# Patient Record
Sex: FEMALE | Race: WHITE | Employment: OTHER | URBAN - METROPOLITAN AREA
[De-identification: names, ages, dates, MRNs, and addresses within clinical notes are randomized per-mention and may not be internally consistent; named-entity substitution may affect disease eponyms.]

---

## 2017-03-15 ENCOUNTER — HOSPITAL ENCOUNTER (OUTPATIENT)
Dept: OTHER | Age: 67
Discharge: HOME | End: 2017-03-15
Attending: INTERNAL MEDICINE | Admitting: INTERNAL MEDICINE

## 2017-03-15 VITALS
OXYGEN SATURATION: 96 % | DIASTOLIC BLOOD PRESSURE: 48 MMHG | TEMPERATURE: 97.7 F | RESPIRATION RATE: 16 BRPM | HEIGHT: 64 IN | SYSTOLIC BLOOD PRESSURE: 104 MMHG | HEART RATE: 66 BPM | WEIGHT: 145 LBS | BODY MASS INDEX: 24.75 KG/M2

## 2017-03-15 RX ORDER — SODIUM CHLORIDE, SODIUM LACTATE, POTASSIUM CHLORIDE, CALCIUM CHLORIDE 600; 310; 30; 20 MG/100ML; MG/100ML; MG/100ML; MG/100ML
INJECTION, SOLUTION INTRAVENOUS CONTINUOUS
Status: DISCONTINUED | OUTPATIENT
Start: 2017-03-15 | End: 2017-03-16 | Stop reason: HOSPADM

## 2017-03-15 RX ADMIN — SODIUM CHLORIDE, SODIUM LACTATE, POTASSIUM CHLORIDE, CALCIUM CHLORIDE: 600; 310; 30; 20 INJECTION, SOLUTION INTRAVENOUS at 07:41

## 2017-03-15 ASSESSMENT — PAIN - FUNCTIONAL ASSESSMENT: PAIN_FUNCTIONAL_ASSESSMENT: 0-10

## 2017-05-03 ENCOUNTER — HOSPITAL ENCOUNTER (OUTPATIENT)
Dept: MAMMOGRAPHY | Age: 67
Discharge: HOME | End: 2017-05-03
Attending: FAMILY MEDICINE | Admitting: FAMILY MEDICINE

## 2017-05-03 DIAGNOSIS — Z12.31 ENCOUNTER FOR SCREENING MAMMOGRAM FOR BREAST CANCER: ICD-10-CM

## 2017-06-30 ENCOUNTER — HOSPITAL ENCOUNTER (OUTPATIENT)
Dept: ULTRASOUND IMAGING | Age: 67
Discharge: HOME | End: 2017-06-30
Attending: FAMILY MEDICINE | Admitting: FAMILY MEDICINE

## 2017-06-30 DIAGNOSIS — N63.0 LUMP OR MASS IN BREAST: ICD-10-CM

## 2017-06-30 DIAGNOSIS — N63.0 BREAST LUMP: ICD-10-CM

## 2018-05-15 ENCOUNTER — HOSPITAL ENCOUNTER (OUTPATIENT)
Dept: MAMMOGRAPHY | Age: 68
Discharge: HOME | End: 2018-05-15
Attending: FAMILY MEDICINE | Admitting: FAMILY MEDICINE

## 2018-05-15 DIAGNOSIS — Z12.31 ENCOUNTER FOR SCREENING MAMMOGRAM FOR BREAST CANCER: ICD-10-CM

## 2019-06-25 ENCOUNTER — HOSPITAL ENCOUNTER (OUTPATIENT)
Dept: MAMMOGRAPHY | Age: 69
Discharge: HOME | End: 2019-06-25
Payer: MEDICARE

## 2019-06-25 DIAGNOSIS — Z12.31 ENCOUNTER FOR SCREENING MAMMOGRAM FOR BREAST CANCER: ICD-10-CM

## 2019-06-25 PROCEDURE — 77067 SCR MAMMO BI INCL CAD: CPT

## 2019-07-15 ENCOUNTER — HOSPITAL ENCOUNTER (OUTPATIENT)
Dept: ULTRASOUND IMAGING | Age: 69
Discharge: HOME | End: 2019-07-15
Payer: MEDICARE

## 2019-07-15 ENCOUNTER — HOSPITAL ENCOUNTER (OUTPATIENT)
Dept: MAMMOGRAPHY | Age: 69
Discharge: HOME | End: 2019-07-15
Payer: MEDICARE

## 2019-07-15 DIAGNOSIS — R92.8 ABNORMAL MAMMOGRAM: ICD-10-CM

## 2019-07-15 PROCEDURE — 76642 ULTRASOUND BREAST LIMITED: CPT

## 2019-07-15 PROCEDURE — G0279 TOMOSYNTHESIS, MAMMO: HCPCS

## 2020-01-13 ENCOUNTER — HOSPITAL ENCOUNTER (OUTPATIENT)
Dept: ULTRASOUND IMAGING | Age: 70
End: 2020-01-13
Payer: MEDICARE

## 2020-01-13 ENCOUNTER — HOSPITAL ENCOUNTER (OUTPATIENT)
Dept: MAMMOGRAPHY | Age: 70
Discharge: HOME | End: 2020-01-13
Payer: MEDICARE

## 2020-01-13 PROCEDURE — G0279 TOMOSYNTHESIS, MAMMO: HCPCS

## 2020-02-20 ENCOUNTER — OFFICE VISIT (OUTPATIENT)
Dept: ORTHOPEDIC SURGERY | Age: 70
End: 2020-02-20
Payer: MEDICARE

## 2020-02-20 VITALS — WEIGHT: 136 LBS | HEIGHT: 64 IN | BODY MASS INDEX: 23.22 KG/M2

## 2020-02-20 PROCEDURE — 99203 OFFICE O/P NEW LOW 30 MIN: CPT | Performed by: ORTHOPAEDIC SURGERY

## 2020-02-20 RX ORDER — BUPIVACAINE HYDROCHLORIDE 5 MG/ML
30 INJECTION, SOLUTION PERINEURAL ONCE
Status: COMPLETED | OUTPATIENT
Start: 2020-02-20 | End: 2020-02-20

## 2020-02-20 RX ORDER — BETAMETHASONE SODIUM PHOSPHATE AND BETAMETHASONE ACETATE 3; 3 MG/ML; MG/ML
12 INJECTION, SUSPENSION INTRA-ARTICULAR; INTRALESIONAL; INTRAMUSCULAR; SOFT TISSUE ONCE
Status: COMPLETED | OUTPATIENT
Start: 2020-02-20 | End: 2020-02-20

## 2020-02-20 RX ADMIN — BETAMETHASONE SODIUM PHOSPHATE AND BETAMETHASONE ACETATE 12 MG: 3; 3 INJECTION, SUSPENSION INTRA-ARTICULAR; INTRALESIONAL; INTRAMUSCULAR; SOFT TISSUE at 14:38

## 2020-02-20 RX ADMIN — BUPIVACAINE HYDROCHLORIDE 150 MG: 5 INJECTION, SOLUTION PERINEURAL at 14:37

## 2020-02-20 NOTE — PROGRESS NOTES
Chief Complaint    Hip Pain (lt lateral hip pain ongoing for awhile, no injury)      History of Present Illness:  Emmett Shrestha is a 78 y.o. female with 1 year of left hip pain. She reports that she came out of assisted about a year ago and since that time she has had a job that requires a lot of heavy lifting and standing for long periods of time. She reports that her pain in her hip has worsened since that time. She reports that the pain is 9 out of 10 and she takes Advil for this. She also reports that recently her bilateral ankles have been swelling and she has some numbness and tingling on the lateral aspect of her foot and ankle. Medical History:  Past Medical History:   Diagnosis Date    Hyperlipidemia     Reflux      Past Surgical History:   Procedure Laterality Date    ANKLE SURGERY      CHOLECYSTECTOMY      COLONOSCOPY  2005    normal    COLONOSCOPY  03/15/2017    diverticulosis/sigmoid polyp    HYSTERECTOMY      OTHER SURGICAL HISTORY  05/22/2013    cystoscopy urethral dilatation    TUBAL LIGATION       No family history on file. Current Outpatient Medications   Medication Sig Dispense Refill    omeprazole (PRILOSEC) 20 MG capsule TAKE ONE CAPSULE BY MOUTH ONCE DAILY. 30 capsule 0    simvastatin (ZOCOR) 40 MG tablet TAKE ONE TABLET BY MOUTH EVERY NIGHT AT BEDTIME. 30 tablet 0    albuterol sulfate HFA (PROVENTIL HFA) 108 (90 Base) MCG/ACT inhaler Inhale 2 puffs into the lungs every 4 hours as needed for Wheezing or Shortness of Breath With spacer (and mask if indicated). Thanks. 1 Inhaler 1     No current facility-administered medications for this visit. Allergies   Allergen Reactions    Dye [Barium-Containing Compounds]     Pcn [Penicillins]        Review of Systems:  Relevant review of systems reviewed and available in the patient's chart    Vital Signs: There were no vitals filed for this visit.     General Exam:   Constitutional: Patient is adequately groomed with no evidence of malnutrition  DTRs: Deep tendon reflexes are intact  Mental Status: The patient is oriented to time, place and person. The patient's mood and affect are appropriate. Lymphatic: The lymphatic examination bilaterally reveals all areas to be without enlargement or induration. Vascular: Examination reveals no swelling or calf tenderness. Peripheral pulses are palpable and 2+. Neurological: The patient has good coordination. There is no weakness or sensory deficit. Body mass index is 23.34 kg/m². Left left Hip Examination:    Inspection:  No erythema or signs of infection. There are no cutaneous lesions. Palpation:  Significant tenderness to palpation over the greater trochanteric region. Range of Motion: Painless full range of motion of the hip particularly with flexion and external rotation without any reproducible groin pain. Strength:  Core/5 strength in flexion and abduction limited by pain. Special Tests: There is a negative log roll maneuver. Negative straight leg raise against resistance. Negative Elizabeth's test.  Negative Homans test.    Skin: There are no rashes, ulcerations or lesions. Gait: Normal    Reflex 2+ patellar    Additional Comments:       Additional Examinations:         Contralateral Exam: Examination of the right hip reveals intact skin. The patient demonstrates full painless range of motion with regards to flexion, abduction, internal and external rotation. There is no tenderness about the greater trochanter. There is a negative straight leg raise against resistance. Strength is 5/5 throughout all planes. Lower Back: Examination of the lower back does not show any tenderness, deformity or injury. Range of motion is unremarkable. There is no gross instability. There are no rashes, ulcerations or lesions.   Strength and tone are normal.    Radiology:     X-rays obtained and reviewed in office:  Views 2 views including AP pelvis and lateral  Location AP

## 2020-02-25 ENCOUNTER — HOSPITAL ENCOUNTER (OUTPATIENT)
Dept: PHYSICAL THERAPY | Age: 70
Setting detail: THERAPIES SERIES
Discharge: HOME | End: 2020-02-25
Payer: MEDICARE

## 2020-02-25 PROCEDURE — 97110 THERAPEUTIC EXERCISES: CPT

## 2020-02-25 PROCEDURE — 97035 APP MDLTY 1+ULTRASOUND EA 15: CPT

## 2020-02-25 PROCEDURE — 97161 PT EVAL LOW COMPLEX 20 MIN: CPT

## 2020-02-25 NOTE — FLOWSHEET NOTE
Outpatient Physical Therapy     [x] Daily Treatment Note   [] Progress Note   [] Discharge Note    Date:  2/25/2020    Patient Name:  Cheri Sanchez         YOB: 1950    Medical Diagnosis: Pain of left hip joint, greater trochanteric bursitis of left hip     ICD 10:  M25.552     Treatment Diagnosis:   BLE weakness, L hip trochanteric bursitis, IT band syndrome, nerve impingement 2/2 lumbar scoliosis     Onset Date: 2 years (worse in past month)     Referral Date: 2/20/20     Referring Physician:  Haroldo Cole MD     Visits Allowed/Insurance/Certification Information:   [place] [employer], $40 copay       Restrictions/Precautions:  NO ionto, NO estim, NO dry needling    Plan of care sent to provider:      [x]Faxed  []Co-signature    (attempts: 1[] 2 []3[])         Plan of care signed:      []Yes date:            []No      Progress Note covers period from (if applicable):    [x]NA    []From          To           Next Progress Note due:   3/22/20    Visit# / total visits:  1/8    Plan for Next Session:  Manual techniques as indicated, progress BLE strengthening, US to L greater trochanter, pelvic and lumbar alignment with stretching for R QL and erector spinae, poss heel lift in several visits if no improvement       Subjective:  See eval     Pain level:   AT EVAL: Patient reports pain is 0/10 pain at best (after a good night sleep) and 9-10/10 pain at its worst.      Objective:       Exercises:    Exercises in bold performed in department today. Items not bolded are carried forward from prior visits for continuity of the record.   Exercise/Equipment Resistance/Repetitions HEP Other comments       []      Sidelying IT band stretch 3x30\" [x]      Bridging with TA set 2x15 [x]      Sidelying positional traction on L side x5 min [] To address L lateral curve scoliosis with nv impinge       []        []        []        []        []          []         []        []        []        []        [] []        []        []      Therapeutic Exercise/Home Exercise Program:   x30 minutes  HEP issued. Educated on anatomy, physiology, and biomechanics of lumbar spine, hip joint, and associated musculature. Pt verbalized understanding and all of patient's questions answered to satisfaction. Group Therapy:    0 minutes    Therapeutic Activity:  0 minutes     Gait: 0 minutes    Neuromuscular Re-Education:  0 minutes      Canalith Repositioning Procedure:  0 minutes    Manual Therapy:  0 minutes  Poss manual lateral shift NV    Modalities: x10 minutes  US to L greater trochanter in sidelying position x10 minutes  (continuous, head warming on, 3.3 MHz, 1.5Wcm2)    Functional Outcome Measure:   []NA  Measure Used: LEFS  Date Assessed: 2/25/20  Score: 15/80 = 81%    Assessment/Treatment/Activity Tolerance:  Pt is 77 yo Female presenting to OP PT clinic with medical diagnosis of pain of left hip joint and greater trochanteric bursitis of left hip. Presents today with BLE weakness, L hip trochanteric bursitis, IT band syndrome, and nerve impingement 2/2 lumbar scoliosis. Would benefit from continued OP PT to address below impairments, improve pain and restore function. Patients response to treatment:   [x]Patient tolerated treatment well []Patient limited by fatigue   []Patient limited by pain  []Patient limited by other medical complications   []Other:     Goals:   Progress towards goals:  Goals established on 2/25/20     Short Term Goals:   2 weeks Long Term Goals:  4   weeks   1). Establish HEP 1). Pt independent with HEP   2). Pain  5/10 or less 2). Pain  2/10 or less   3). Increase strength 1/3 grade  3). Increase strength to 4+/5 or greater    4). Tolerate ADLs without increased pain 4). Return to all ADLs without limitation   5). 5).  LEFS >45/80   6).  6).        Prognosis: [x]Good   []Fair   []Poor    Patient Requires Follow-up:  [x]Yes  []No    Plan: [x]Plan of care initiated     []Continue per

## 2020-02-25 NOTE — PROGRESS NOTES
LOWER QUARTER PHYSICAL THERAPY EVALUATION      Evaluation Date: 2/25/2020       Patient Name: Torsten Chambers     YOB: 1950      Medical Diagnosis: Pain of left hip joint, greater trochanteric bursitis of left hip    ICD 10:  M25.552    Treatment Diagnosis:   BLE weakness, L hip trochanteric bursitis, IT band syndrome, nerve impingement 2/2 lumbar scoliosis     Onset Date: 2 years (worse in past month)    Referral Date: 2/20/20    Referring Physician:  Garland Reynoso MD     Visits Allowed/Insurance/Certification Information:   [employer], $40 copay       Restrictions/Precautions:  NO ionto, NO estim, NO dry needling    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 Reports no issue with stair climbing. Health History reviewed with pt:   [x]Yes   []No      Denies     SUBJECTIVE FINDINGS         History of Present Illness:         Pt presents with L hip pain of gradual onset appx 2 years ago. States L ankle stays swollen all the time. Reports numbness/tingling in outside of L foot. Had cortisone injection in L hip at MD visit on 2/20/20. States [facility] recommended pt see a spine specialist.   F/u with [facility] on 3/17/20.     Lumbar xray shows significant lumbar scoliosis with inc curve to L causing dec foraminal opening at L4/L5 and L5/S1    Pain       Patient describes pain to be achy, constant  Patient reports pain is 0/10 pain at best (after a good night sleep) and 9-10/10 pain at its worst.  Worsened by prolonged walking  Improved by ice, uses heating pad, tried lidocaine patches but reports they don't help  Pt. reports pain with coughing, sneezing and laughing:    []Yes   [x]No   []NA   Pt. reports bowel and bladder changes:      []Yes   [x]No   []NA   Pt. reports 2+ 3+    Hip Adduction  (L3)          Hip IR          Hip ER          Knee Flexion  (L5,S1)     3- 4+    Knee Extension  (L3,4)     4- 4+    Ankle Dorsiflex  (L4)     4- 4    Ankle Plantarflex  (S1,2)     3+ 4    Ankle Inversion          Ankle Eversion          Great Toe Ext  (L5)              Flexibility     Muscle Findings   Hip flexors/Bradley  [x]WNL   []Decreased R   []Decreased L   []NT   Hamstrings  Degrees in 90/90 [x]WNL   []Decreased R   []Decreased L   []NT  Right:              Left:      Gastrocs []WNL   []Decreased R   []Decreased L   [x]NT   Obers/TFL/ITB []WNL   []Decreased R   [x]Decreased L   []NT   Piriformis  []WNL   []Decreased R   []Decreased L   [x]NT     Special Tests Lumbosacral and hip- supine/sidelying/prone    (L) = Laslett's Criteria: 2 positive tests  Special Test Findings   Sit up test/ Supine Long sit test  (C) [x]Neg   []Pos R   []Pos L   []NT  Comments: R short to short   SI distraction                               (L) [x]Neg   []Pos   []NT   Thigh Thrust test                         (L) [x]Neg   []Pos R   []Pos L   []NT   90/90 test  []Neg   []Pos R   []Pos L   [x]NT   Gaenslen's test []Neg   []Pos R   []Pos L   [x]NT   Straight Leg Raise [x]Neg   []Pos R   []Pos L   []NT   Crams []Neg   []Pos R   []Pos L   [x]NT   Lumbar Distraction  []Relief noted   [x]No relief noted  []Rebound pain   []NT   Hip scour [x]Neg   []Pos R   []Pos L   []NT   Marilin's test [x]Neg   []Pos R   []Pos L   []NT   Nikunj's test [x]Neg   []Pos R   []Pos L   []NT   Oscillation []Neg   []Pos R   []Pos L   [x]NT   Ant/Post Provocation  []Neg   []Pos R   []Pos L   [x]NT   SI compression                           (L) []Neg   []Pos   [x]NT   Prone knee flexion test               (C) []Neg   []Pos R   []Pos L   [x]NT  Comments:    Femoral nerve tension test []Neg   []Pos R   []Pos L   [x]NT   Pheasant test []Neg   []Pos R   []Pos L   [x]NT   Sacral thrusts                              (L) []Neg   []Pos [x]NT  []Base   [][place]   []R Sacral Sulcus  []L Sacral Sulcus   []R JENIFFER   []L JENIFFER       Palpation     Patient reported tenderness with palpation:  [x]Yes   []No   []NT  Location:  L greater trochanter, L IT band  PT notes warmth:  []Yes   [x]No   []NT  Location:   PT notes increased muscle tone:   []Yes   [x]No   []NT  Location:   PT notes crepitus with palpation:   []Yes   [x]No   []NT   Location:     Appearance    PT notes swelling:   [x]Yes   []No   []NT  Location:   B ankles (L>R)   PT notes redness:  []Yes   [x]No   []NT  Location:   PT notes drainage:   []Yes   [x]No   []NT  Location:      Girth Measurements (cm)        Location Left Right Location Left Right   6 superior to superior patellar pole   3\" inferior to inferior patellar pole     3 superior to superior patellar pole    6\" inferior to inferior patellar pole     Superior patellar pole   Malleoli 25.5 25   Inferior patellar pole   Figure 8 54 52      Met heads           Functional Outcome Measure:     Measure Used: LEFS  Date Assessed: 2/25/20  Score: 15/80 = 81%    ASSESSMENT     Pt is 77 yo Female presenting to OP PT clinic with medical diagnosis of pain of left hip joint and greater trochanteric bursitis of left hip. Presents today with BLE weakness, L hip trochanteric bursitis, IT band syndrome, and nerve impingement 2/2 lumbar scoliosis. Would benefit from continued OP PT to address below impairments, improve pain and restore function. Problems        [x]Decreased trunk ROM  [x]Decreased LE ROM  [x]Decreased strength  [x]Positive neurological findings  [x]Decreased joint mobility  [x]Increased pain  [x]Decreased flexibility  [x]Abnormality of gait  []Decreased balance  [x]Increased swelling  [x]Poor posture/alignment  [x]Decreased functional status     Rehabilitation Potential:  Good for goals listed below.     Strengths for achieving goals include:   [x]Pt motivated   [x]PLOF   [x]Family support   Limitations for achieving goals include:

## 2020-02-27 ENCOUNTER — HOSPITAL ENCOUNTER (OUTPATIENT)
Dept: PHYSICAL THERAPY | Age: 70
Setting detail: THERAPIES SERIES
Discharge: HOME | End: 2020-02-27
Payer: MEDICARE

## 2020-02-27 PROCEDURE — 97110 THERAPEUTIC EXERCISES: CPT

## 2020-02-27 NOTE — FLOWSHEET NOTE
Patient was seen for 2 Visits of PT. Initial eval date 02/25/2020. Patient was not seen for additional visits due to requesting to DC. 2/2 work conflict. As of 02/27/2020., pt MET 1/4 STGs and 0/5 LTGs. Remaining goals not met secondary to early discharge at pt's request. Discussed with pt that she will need to obtain new order from MD to return to OP PT program. Porfirio Peace on 3/17/20. Recommendation to continue HEP as prepared. Please see attached treatment note and eval from 2/25/20 for most recent status. Outpatient Physical Therapy     [x] Daily Treatment Note   [] Progress Note   [x] Discharge Note    Date:  2/27/2020    Patient Name:  Vladislav Gauthier         YOB: 1950    Medical Diagnosis: Pain of left hip joint, greater trochanteric bursitis of left hip     ICD 10:  M25.552     Treatment Diagnosis:   BLE weakness, L hip trochanteric bursitis, IT band syndrome, nerve impingement 2/2 lumbar scoliosis     Onset Date: 2 years (worse in past month)     Referral Date: 2/20/20     Referring Physician:  Susan Nixon MD     Visits Allowed/Insurance/Certification Information:   [employer], $40 copay       Restrictions/Precautions:  NO ionto, NO estim, NO dry needling    Plan of care sent to provider:      [x]Faxed  []Co-signature    (attempts: 1[] 2 []3[])         Plan of care signed:      []Yes date:            []No      Progress Note covers period from (if applicable):    [x]NA    []From          To           Next Progress Note due:   3/22/20    Visit# / total visits:  2/8    Plan for Next Session:  Manual techniques as indicated, progress BLE strengthening, US to L greater trochanter, pelvic and lumbar alignment with stretching for R QL and erector spinae, poss heel lift in several visits if no improvement       Subjective: Thinks the ultrasound helped with pain. Pt states she will not be able to come back to PT 2/2 work schedule. Christine Zamarripa on 3/17/20.  Will ask for new order to return to PT if needed at that time. Pain level: 0/10 currently   AT EVAL: Patient reports pain is 0/10 pain at best (after a good night sleep) and 9-10/10 pain at its worst.      Objective:       Exercises:    Exercises in bold performed in department today. Items not bolded are carried forward from prior visits for continuity of the record. Exercise/Equipment Resistance/Repetitions HEP Other comments       []      Sidelying IT band stretch 3x30\" [x]      Bridging with TA set 2x15 [x]      Sidelying positional traction on L side x5 min [x] To address L lateral curve scoliosis with nv impinge     SLR  [x]      Supine hooklying adductor pillow squeeze 2x10 with 5 sec holds [x]      Piriformis stretch 3x30\" [x]      Quad + glute set x10 with 5 sec holds [x]        []        Therex to start 3/9/20:  []       Standing heel raise/toe raise Verbalized, not performed [x]       Standing hip ext Verbalized, not performed [x]       Standing hip abd Verbalized, not performed [x]       Wall slides Verbalized, not performed [x]        []        []        []        []      Therapeutic Exercise/Home Exercise Program:   30 minutes  HEP reviewed and revised. Educated on anatomy, physiology, and biomechanics of lumbar spine, hip joint, and associated musculature. Pt verbalized understanding and all of patient's questions answered to satisfaction.     Group Therapy:    0 minutes    Therapeutic Activity:  0 minutes     Gait: 0 minutes    Neuromuscular Re-Education:  0 minutes      Canalith Repositioning Procedure:  0 minutes    Manual Therapy:  0 minutes  Poss manual lateral shift NV    Modalities: 12 minutes  US to L greater trochanter in sidelying position x12 minutes  (continuous, head warming on, 3.3 MHz, 1.5Wcm2)    Functional Outcome Measure:   []NA  Measure Used: LEFS  Date Assessed: 2/25/20  Score: 15/80 = 81%    Assessment/Treatment/Activity Tolerance:   Patients response to treatment:   [x]Patient tolerated

## 2020-03-17 ENCOUNTER — OFFICE VISIT (OUTPATIENT)
Dept: ORTHOPEDIC SURGERY | Age: 70
End: 2020-03-17
Payer: MEDICARE

## 2020-03-17 PROCEDURE — 99213 OFFICE O/P EST LOW 20 MIN: CPT | Performed by: PHYSICIAN ASSISTANT

## 2020-04-03 ENCOUNTER — TELEPHONE (OUTPATIENT)
Dept: ORTHOPEDIC SURGERY | Age: 70
End: 2020-04-03

## 2020-04-03 NOTE — TELEPHONE ENCOUNTER
I tried to call this patient and move her new patient appointment with Ranjit Diaz from the 7th to the 6th.  I left a detailed message letting her know this and will try her again early Monday morning as we are closing the office today at 1pm.

## 2020-06-01 ENCOUNTER — OFFICE VISIT (OUTPATIENT)
Dept: ORTHOPEDIC SURGERY | Age: 70
End: 2020-06-01
Payer: MEDICARE

## 2020-06-01 VITALS — HEIGHT: 64 IN | BODY MASS INDEX: 23.22 KG/M2 | RESPIRATION RATE: 16 BRPM | WEIGHT: 136.02 LBS

## 2020-06-01 PROCEDURE — 99214 OFFICE O/P EST MOD 30 MIN: CPT | Performed by: PHYSICIAN ASSISTANT

## 2020-06-01 PROCEDURE — L0642 LO SAG RI AN/POS PNL PRE OTS: HCPCS | Performed by: PHYSICIAN ASSISTANT

## 2020-06-01 RX ORDER — METHYLPREDNISOLONE 4 MG/1
TABLET ORAL
Qty: 1 KIT | Refills: 0 | Status: ON HOLD | OUTPATIENT
Start: 2020-06-01 | End: 2020-07-14 | Stop reason: ALTCHOICE

## 2020-06-01 NOTE — PROGRESS NOTES
Procedure Laterality Date    ANKLE SURGERY      CHOLECYSTECTOMY      COLONOSCOPY  2005    normal    COLONOSCOPY  03/15/2017    diverticulosis/sigmoid polyp    HYSTERECTOMY      OTHER SURGICAL HISTORY  05/22/2013    cystoscopy urethral dilatation    TUBAL LIGATION       Current Medications:     Current Outpatient Medications:     omeprazole (PRILOSEC) 20 MG capsule, TAKE ONE CAPSULE BY MOUTH ONCE DAILY. , Disp: 30 capsule, Rfl: 0    simvastatin (ZOCOR) 40 MG tablet, TAKE ONE TABLET BY MOUTH EVERY NIGHT AT BEDTIME., Disp: 30 tablet, Rfl: 0    albuterol sulfate HFA (PROVENTIL HFA) 108 (90 Base) MCG/ACT inhaler, Inhale 2 puffs into the lungs every 4 hours as needed for Wheezing or Shortness of Breath With spacer (and mask if indicated). Thanks. , Disp: 1 Inhaler, Rfl: 1  Allergies:  Dye [barium-containing compounds] and Pcn [penicillins]  Social History:    reports that she has been smoking cigarettes. She has a 56.00 pack-year smoking history. She has never used smokeless tobacco. She reports that she does not drink alcohol or use drugs. Family History:   History reviewed. No pertinent family history. REVIEW OF SYSTEMS: Full ROS noted & scanned   CONSTITUTIONAL: Denies unexplained weight loss, fevers, chills or fatigue  NEUROLOGICAL: Denies unsteady gait or progressive weakness  MUSCULOSKELETAL: admits joint swelling or redness  PSYCHOLOGICAL: Denies anxiety, depression   SKIN: Denies skin changes, delayed healing, rash, itching   HEMATOLOGIC: Denies easy bleeding or bruising  ENDOCRINE: Denies excessive thirst, urination, heat/cold  RESPIRATORY: Denies current dyspnea, cough  GI: Denies nausea, vomiting, diarrhea   : Denies bowel or bladder issues       PHYSICAL EXAM:    Vitals: Resp. rate 16, height 5' 4.02\" (1.626 m), weight 136 lb 0.4 oz (61.7 kg). GENERAL EXAM:  · General Apparence: Patient is adequately groomed with no evidence of malnutrition.   · Orientation: The patient is oriented to time, the spine or related soft tissue  Support weak spinal muscles    The patient was educated and fit by a healthcare professional with expert knowledge and specialization in brace application while under the direct supervision of the physician. Verbal and written instructions for the use of and application of this item were provided. They were instructed to contact the office immediately should the brace result in increased pain, decreased sensation, increased swelling or worsening of the condition.          [facility]

## 2020-06-02 ENCOUNTER — TELEPHONE (OUTPATIENT)
Dept: ORTHOPEDIC SURGERY | Age: 70
End: 2020-06-02

## 2020-06-02 NOTE — TELEPHONE ENCOUNTER
06/02/2020   [facility] [place] . NO PRECERTIFICATION OR POST SERVICE CLINICAL REVIEW IS REQUIRED. PER [employer] ONLINE FOR [employer]. TRACKING # [id number]. DED   $ 0  CO INS  80/20  OOP  $ 4900/ $4597.75 REMAINING    PER [employer]LINE FOR [employer].  AP

## 2020-06-29 ENCOUNTER — TELEPHONE (OUTPATIENT)
Dept: ORTHOPEDIC SURGERY | Age: 70
End: 2020-06-29

## 2020-06-29 ENCOUNTER — OFFICE VISIT (OUTPATIENT)
Dept: ORTHOPEDIC SURGERY | Age: 70
End: 2020-06-29
Payer: MEDICARE

## 2020-06-29 VITALS — HEIGHT: 64 IN | BODY MASS INDEX: 23.22 KG/M2 | WEIGHT: 136.02 LBS

## 2020-06-29 PROCEDURE — 99214 OFFICE O/P EST MOD 30 MIN: CPT | Performed by: PHYSICIAN ASSISTANT

## 2020-06-29 RX ORDER — GABAPENTIN 300 MG/1
300 CAPSULE ORAL NIGHTLY PRN
Qty: 30 CAPSULE | Refills: 0 | Status: SHIPPED | OUTPATIENT
Start: 2020-06-29 | End: 2020-10-05

## 2020-06-29 NOTE — TELEPHONE ENCOUNTER
RETURNED PATIENT CALL REGARDING HER MEDICATIONS THAT WERE PRESCRIBED TODAY.    ALL QUESTIONS ANSWERED     VOICED Keyur Crouch

## 2020-06-29 NOTE — LETTER
[facility]                     ______________________________________________________________________      [address] [place]      1. Admit to preop. 2. Start IV 1000 ml LR at [facility] or _____ml/hr for planned conscious sedation     3. May inject 1 % Lidocaine 0.1 ml Intradermal to numb IV site     4. Protime/INR if patient is on Coumadin     5. Urine Pregnancy Test (females only) - 12 -50 years     6. Accu Check Glucose if diabetic. Notify physician if <80 or >250.      7. Sedate all neurotomies          ______________________________________________________________________    POST-OPERATIVE ORDERS - DR. LUNSFORD      1. Admit to Post Op Phase 2     2. Implement Standards of Care for Phase 2 Post Op     3. Check Site - May discharge when site is free of bleeding     4. Discharge to home after meets Phase 2 criteria     5. Discharge cervical patients after 30 minutes and when meets Phase 2 criteria. 6. Give discharge instruction sheet     7. For Diabetic patient, if blood sugar less than 80 in preop,          Recheck blood sugar in Post Op. 8. Discontinue IV     9.  For Nausea may give Zofran 4 MG IV/IM/ODT           ______________________________________________________________________    Penny Wise     1950 6/29/20 9:52 AM

## 2020-06-29 NOTE — PROGRESS NOTES
 Hyperlipidemia     Reflux       Past Surgical History:     Past Surgical History:   Procedure Laterality Date    ANKLE SURGERY      CHOLECYSTECTOMY      COLONOSCOPY  2005    normal    COLONOSCOPY  03/15/2017    diverticulosis/sigmoid polyp    HYSTERECTOMY      OTHER SURGICAL HISTORY  05/22/2013    cystoscopy urethral dilatation    TUBAL LIGATION       Current Medications:     Current Outpatient Medications:     methylPREDNISolone (MEDROL, TENA,) 4 MG tablet, Take by mouth., Disp: 1 kit, Rfl: 0    omeprazole (PRILOSEC) 20 MG capsule, TAKE ONE CAPSULE BY MOUTH ONCE DAILY. , Disp: 30 capsule, Rfl: 0    simvastatin (ZOCOR) 40 MG tablet, TAKE ONE TABLET BY MOUTH EVERY NIGHT AT BEDTIME., Disp: 30 tablet, Rfl: 0    albuterol sulfate HFA (PROVENTIL HFA) 108 (90 Base) MCG/ACT inhaler, Inhale 2 puffs into the lungs every 4 hours as needed for Wheezing or Shortness of Breath With spacer (and mask if indicated). Thanks. , Disp: 1 Inhaler, Rfl: 1  Allergies:  Dye [barium-containing compounds] and Pcn [penicillins]  Social History:    reports that she has been smoking cigarettes. She has a 56.00 pack-year smoking history. She has never used smokeless tobacco. She reports that she does not drink alcohol or use drugs. Family History:   History reviewed. No pertinent family history. REVIEW OF SYSTEMS: Full ROS noted & scanned   CONSTITUTIONAL: Denies unexplained weight loss, fevers, chills or fatigue  NEUROLOGICAL: Denies unsteady gait or progressive weakness      PHYSICAL EXAM:    Vitals: Height 5' 4.02\" (1.626 m), weight 136 lb 0.4 oz (61.7 kg). GENERAL EXAM:  · General Apparence: Patient is adequately groomed with no evidence of malnutrition. · Orientation: The patient is oriented to time, place and person.    · Mood & Affect:The patient's mood and affect are appropriate   · Lymphatic: The lymphatic examination bilaterally reveals all areas to be without enlargement or induration  · Sensation: Sensation is Acute/chronic LBP, left lumbar radiculitis  2) Left L2-3 HNP w/LR stenosis, L4-5 spondy w/mod-sev CS  3) Hip eval, Dr. Jalen Young  4) ORT=0      Plan:   1) We her lumbar MRI scan. She wishes to proceed with left L2-3 TX MICAH #1. Procedure risk and benefits were discussed. 2) Gabapentin 300mg I po qHS PRN    3) Discuss renal findings w/PCP    4) F/u after MICAH.   Could also consider left L4-5 TX MICAH route if warranted--at this time has left L3 radiating pattern      Gwendolyn Haq  [facility]

## 2020-07-01 ENCOUNTER — TELEPHONE (OUTPATIENT)
Dept: ORTHOPEDIC SURGERY | Age: 70
End: 2020-07-01

## 2020-07-01 NOTE — TELEPHONE ENCOUNTER
I faxed a copy of the patients MRI results to her PCP Dr. Whitney Taveras at [phone]. The faxed conformation is scanned in the chart.

## 2020-07-10 ENCOUNTER — TELEPHONE (OUTPATIENT)
Dept: ORTHOPEDIC SURGERY | Age: 70
End: 2020-07-10

## 2020-07-10 NOTE — TELEPHONE ENCOUNTER
Auth: # NPR    Date: 07/14/2020  Type of SX:  OP  Location: [facility]  CPT: [id number]   DX Code: M51.26  M54.16  M43.16   M48.062  SX area: LEft  L2  L3  Trans foraminal MICAH  Insurance: [facility] Medicare  Valid  07/14/2020 - 10/12/2020

## 2020-07-14 ENCOUNTER — HOSPITAL ENCOUNTER (OUTPATIENT)
Age: 70
Setting detail: OUTPATIENT SURGERY
Discharge: HOME | End: 2020-07-14
Attending: PHYSICAL MEDICINE & REHABILITATION | Admitting: PHYSICAL MEDICINE & REHABILITATION
Payer: MEDICARE

## 2020-07-14 VITALS
WEIGHT: 134 LBS | DIASTOLIC BLOOD PRESSURE: 85 MMHG | HEIGHT: 64 IN | SYSTOLIC BLOOD PRESSURE: 137 MMHG | RESPIRATION RATE: 12 BRPM | HEART RATE: 86 BPM | OXYGEN SATURATION: 98 % | TEMPERATURE: 97 F | BODY MASS INDEX: 22.88 KG/M2

## 2020-07-14 PROCEDURE — 6360000004 HC RX CONTRAST MEDICATION: Performed by: PHYSICAL MEDICINE & REHABILITATION

## 2020-07-14 PROCEDURE — 6360000002 HC RX W HCPCS: Performed by: PHYSICAL MEDICINE & REHABILITATION

## 2020-07-14 PROCEDURE — 3600000002 HC SURGERY LEVEL 2 BASE: Performed by: PHYSICAL MEDICINE & REHABILITATION

## 2020-07-14 PROCEDURE — 2709999900 HC NON-CHARGEABLE SUPPLY: Performed by: PHYSICAL MEDICINE & REHABILITATION

## 2020-07-14 PROCEDURE — 3600000012 HC SURGERY LEVEL 2 ADDTL 15MIN: Performed by: PHYSICAL MEDICINE & REHABILITATION

## 2020-07-14 PROCEDURE — 7100000010 HC PHASE II RECOVERY - FIRST 15 MIN: Performed by: PHYSICAL MEDICINE & REHABILITATION

## 2020-07-14 PROCEDURE — 2500000003 HC RX 250 WO HCPCS: Performed by: PHYSICAL MEDICINE & REHABILITATION

## 2020-07-14 RX ORDER — DEXAMETHASONE SODIUM PHOSPHATE 10 MG/ML
INJECTION, SOLUTION INTRAMUSCULAR; INTRAVENOUS PRN
Status: DISCONTINUED | OUTPATIENT
Start: 2020-07-14 | End: 2020-07-14 | Stop reason: ALTCHOICE

## 2020-07-14 RX ORDER — DEXAMETHASONE SODIUM PHOSPHATE 10 MG/ML
INJECTION, SOLUTION INTRAMUSCULAR; INTRAVENOUS
Status: DISCONTINUED
Start: 2020-07-14 | End: 2020-07-14 | Stop reason: HOSPADM

## 2020-07-14 RX ORDER — LIDOCAINE HYDROCHLORIDE 10 MG/ML
INJECTION, SOLUTION EPIDURAL; INFILTRATION; INTRACAUDAL; PERINEURAL PRN
Status: DISCONTINUED | OUTPATIENT
Start: 2020-07-14 | End: 2020-07-14 | Stop reason: ALTCHOICE

## 2020-07-14 ASSESSMENT — PAIN - FUNCTIONAL ASSESSMENT
PAIN_FUNCTIONAL_ASSESSMENT: 0-10
PAIN_FUNCTIONAL_ASSESSMENT: PREVENTS OR INTERFERES SOME ACTIVE ACTIVITIES AND ADLS

## 2020-07-14 ASSESSMENT — PAIN DESCRIPTION - DESCRIPTORS: DESCRIPTORS: STABBING

## 2020-07-14 NOTE — H&P
HISTORY AND PHYSICAL/PRE-SEDATION ASSESSMENT    Patient:  Ayden Izquierdo   :  1950  Medical Record No.:  [id number]   Date:  2020  Physician:  Maryalice Simmonds, M.D. Facility: [facility]     Nursing History and Physical reviewed and agreed upon. Additional findings:    Allergies:  Dye [barium-containing compounds] and Pcn [penicillins]    Home Medications:    Prior to Admission medications    Medication Sig Start Date End Date Taking? Authorizing Provider   gabapentin (NEURONTIN) 300 MG capsule Take 1 capsule by mouth nightly as needed (prn) for up to 30 days. 20  Anali Vega PA-C   methylPREDNISolone (MEDROL, TENA,) 4 MG tablet Take by mouth. 20   Anali Vega PA-C   albuterol sulfate HFA (PROVENTIL HFA) 108 (90 Base) MCG/ACT inhaler Inhale 2 puffs into the lungs every 4 hours as needed for Wheezing or Shortness of Breath With spacer (and mask if indicated). Thanks. 17  [facility] HospitalStacey Barry MD   omeprazole (PRILOSEC) 20 MG capsule TAKE ONE CAPSULE BY MOUTH ONCE DAILY. 13   Leandrew Oppenheim, APRN - CNP   simvastatin (ZOCOR) 40 MG tablet TAKE ONE TABLET BY MOUTH EVERY NIGHT AT BEDTIME. 13   Leandrew Oppenheim, APRN - CNP       Vitals: Stable     PHYSICAL EXAM:  HENT: Airway patent and reviewed  Cardiovascular: Normal rate, regular rhythm, normal heart sounds. Pulmonary/Chest: No wheezes. No rhonchi. No rales. Abdominal: Soft. Bowel sounds are normal. No distension. Mallampati: 2      MALLAMPATI:           []   I. Complete visualization of the soft palate           [x]   II. Complete visualization of the uvula            []   III. Visualization of only the base of the uvula           []   IV. Soft palate is not visible     ASA CLASS:         []   I. Normal, healthy adult           [x]   II.  Mild systemic disease            []   III.   Severe systemic disease      Sedation plan:   [x]  Local []  Minimal                  []  General anesthesia    Patient's condition acceptable for planned procedure/sedation. Post Procedure Plan   Return to same level of care   ______________________     The risks and benefits as well as alternatives to the procedure have been discussed with the patient and or family. The patient and or next of kin understands and agrees to proceed.     Mario Shelby M.D.

## 2020-07-14 NOTE — OP NOTE
Patient:  Colonel Donohue  Record #:  [id number]   Date:  7/14/2020  Physician:  Teresa Church M.D. Facility: [facility]       Pre-op diagnosis: Lumbar radiculitis, lumbar spondylosis   Post-op diagnosis:  same  Procedure: Left L2-3 transforaminal epidural injection #1 with flouroscopic guidance    Procedure Note:    The patient was admitted through pre-op and written consent was obtained. The patient was advised of the risks and benefits of the procedure, including but not limited to the following: bleeding, pain, infection, temporary paralysis, nerve damage and spinal headache. The patient was given the opportunity to ask questions. There were no contraindications for this procedure. The appropriate area was prepped and draped in a sterile fashion. Landmarks were identified and marked. A 23G spinal needle was advanced to the left L2 neural foramen using fluoroscopic guidance with ideal needle tip placement confirmed by multiple views. Injection of contrast showed epidural flow. There were no signs of intravascular or intrathecal injection. 10 mg Dexamethasone and 2 mL lidocaine were then injected. There were no complications and the patient tolerated the procedure well. The patient was transferred to the recovery area and monitored. Discharge instructions were given. The patient is to contact me for any post-procedure concerns. The patient is to follow up as scheduled.     Estimated blood loss: none    TRINITY Jimenez MD

## 2020-08-04 ENCOUNTER — TELEPHONE (OUTPATIENT)
Dept: ORTHOPEDIC SURGERY | Age: 70
End: 2020-08-04

## 2020-08-04 ENCOUNTER — VIRTUAL VISIT (OUTPATIENT)
Dept: ORTHOPEDIC SURGERY | Age: 70
End: 2020-08-04
Payer: MEDICARE

## 2020-08-04 PROCEDURE — 99442 PR PHYS/QHP TELEPHONE EVALUATION 11-20 MIN: CPT | Performed by: PHYSICIAN ASSISTANT

## 2020-08-04 NOTE — PROGRESS NOTES
TELEPHONE VISIT: SPINE    CHIEF COMPLAINT:    Chief Complaint   Patient presents with    Back Pain       Patient provided verbal consent to proceed with telephone visit; aware he/she may receive a bill for this telephone service, depending on insurance coverage. The patient is being evaluated by a telephone encounter due to the restrictions of the COVID-19 pandemic. A caregiver was present when appropriate. All issues as below were discussed and addressed, but no physical exam was performed unless allowed by visual confirmation. If it was felt that patient should be evaluated in clinic then they were directed there. Due to this being a TeleHealth encounter (During [facility]-97 public health emergency), evaluation of the following organ systems was limited to: spine. Pursuant to the emergency declaration under the [facility], 1135 waiver authority and the [employer] Act, this Virtual Visit was conducted with patient's verbal consent, to reduce the risk of exposure to COVID-19 and provide necessary medical care. The patient (and/or legal guardian) has also been advised to contact this office for worsening conditions or problems, and seek emergency medical treatment and/or call 911 if deemed necessary. Individuals present during telemedicine consultation-[facility] & the patient     HISTORY OF PRESENT ILLNESS:                The patient is a 78 y.o. female consent granted for telephone visit to follow-up after left L2-3 TFESI #1 from 7/14/2020. She reports subacute/chronic aching low back pain radiating into the left lateral hip, anterior thigh/calf to the foot with numbness. Her symptoms have been increased over the last 2 months. Pain is worsened with any activity, bending lifting. Some relief with rest and heat.     She reports 80% benefit for 10 days following the injection but no significant improvement overall at this time. Other conservative care includes left GTB injection without relief, PT, NSAIDs, MDP, bracing, side effects to gabapentin. Denies any contralateral radiating pain. Denies any progressive extremity weakness or recent bowel or bladder dysfunction. No recent trauma. No recent fevers chills infections. 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    Medical history:  Past Medical History:   Diagnosis Date    Hyperlipidemia     Reflux        Past Surgical History:     Past Surgical History:   Procedure Laterality Date    ANKLE SURGERY      CHOLECYSTECTOMY      COLONOSCOPY  2005    normal    COLONOSCOPY  03/15/2017    diverticulosis/sigmoid polyp    HYSTERECTOMY      OTHER SURGICAL HISTORY  05/22/2013    cystoscopy urethral dilatation    PAIN MANAGEMENT PROCEDURE Left 7/14/2020    LEFT LUMBAR TWO THREE EPIDURAL STEROID INJECTION SITE CONFIRMED BY FLUOROSCOPY performed by Kylee Topete MD at [address]       Current Medications:     Current Outpatient Medications:     Ibuprofen (ADVIL PO), Take by mouth, Disp: , Rfl:     omeprazole (PRILOSEC) 20 MG capsule, TAKE ONE CAPSULE BY MOUTH ONCE DAILY. , Disp: 30 capsule, Rfl: 0    simvastatin (ZOCOR) 40 MG tablet, TAKE ONE TABLET BY MOUTH EVERY NIGHT AT BEDTIME., Disp: 30 tablet, Rfl: 0    gabapentin (NEURONTIN) 300 MG capsule, Take 1 capsule by mouth nightly as needed (prn) for up to 30 days. , Disp: 30 capsule, Rfl: 0    albuterol sulfate HFA (PROVENTIL HFA) 108 (90 Base) MCG/ACT inhaler, Inhale 2 puffs into the lungs every 4 hours as needed for Wheezing or Shortness of Breath With spacer (and mask if indicated). Thanks. , Disp: 1 Inhaler, Rfl: 1  Allergies:  Dye [barium-containing compounds] and Pcn [penicillins]  Social History:    reports that she has been smoking cigarettes. She has a 56.00 pack-year smoking history. She has never used smokeless tobacco. She reports that she does not drink alcohol or use drugs. Family History:   History reviewed. No pertinent family history. REVIEW OF SYSTEMS:   CONSTITUTIONAL: Denies unexplained weight loss, fevers, chills or fatigue  NEUROLOGICAL: Denies unsteady gait or progressive weakness  MUSCULOSKELETAL: Denies joint swelling or redness  GI: Denies nausea, vomiting, diarrhea   : Denies bowel or bladder issues         Vitals: Height 5 4, weight 136 pounds    Diagnostic Testing:   Lumbar MRI scan report independently reviewed from 6/12/2020 showing left disc extrusion L2-3 with severe left lateral recess stenosis, L4-5 spondylolisthesis with moderate to severe central stenosis. Partially visualized septated renal cyst     2 views lumbar spine flexion extension 6/1/2020 no high-grade instability on flexion-extension     Lumbar x-rays reviewed showing L4-5 spondylolisthesis, severe DDD L5-S1, likely degenerative endplate changes I0-8 with mild retrolisthesis, significant scoliosis     Left hip x-rays show mild hip OA           Impression:  1) Acute/chronic LBP, left lumbar radiculitis  2) Left L2-3 HNP w/LR stenosis, L4-5 spondy w/mod-sev CS  3) Hip eval, Dr. Andres Mosheim  4) ORT=0     Plan:  1) We had a long discussion. She wishes to proceed with left L4-5 TFESI #2 with IV sedation. Procedure risk and benefits were discussed.     2) Referral to Dr. Cherie Davison    3) F/u after [facility]     Present during telephone call: Aditi Gallagher [facility]    Total time spent on medical discussion/telephone visit: 15min    Anali Katz [facility]

## 2020-08-04 NOTE — LETTER
[facility]    Please Schedule the following with: Dr. Yovany Urbano    Date:  8/4/20     Patient: Monique Oliver     YOB: 1950    Patient Home Phone: [phone] (home)    Diagnosis: Left lumbar radiculitis M54.16, L4-5 spondylolisthesis M43.16 with moderate to severe central stenosis M48.062  [x]LT     []RT     []DARIANA     []Midline    Levels: L4-5 #2    []Cervical MICAH 98958, 59353  []L-MBB 46981, 05715  []SI Joint 80663   []C-FACET 87973, 07129, 92874  []L-FACET [id number], 54319  []Interlaminar MICAH 75256     []HIP 56425    []C-MBB  [x]Transforaminal MICAH 13232  []Neurotomy 41354, 67352, 50379    Attending Physician: Kojo Spain    Injection Schedule for: At: [facility]    First Insurance:[employer] MEDICARE                         Pre-cert #:  Second Insurance:                 Pre-cert #:    Comments:  7/14/2020 Left L2-3 transforaminal epidural injection #1--50% relief x10 days    SEDATION:       [x] IV           [] ORAL    [] Blood Thinner:                 []Diabetic           []Antibiotic:               []Glaucoma:    [] Pacemaker/defib       [] Current Open Wounds, Lacerations or Sores     Allergies: Allergies   Allergen Reactions    Dye [Barium-Containing Compounds]     Pcn [Penicillins]        Past Medical History:   Diagnosis Date    Hyperlipidemia     Reflux         Current Outpatient Medications   Medication Sig Dispense Refill    Ibuprofen (ADVIL PO) Take by mouth      omeprazole (PRILOSEC) 20 MG capsule TAKE ONE CAPSULE BY MOUTH ONCE DAILY. 30 capsule 0    simvastatin (ZOCOR) 40 MG tablet TAKE ONE TABLET BY MOUTH EVERY NIGHT AT BEDTIME. 30 tablet 0    gabapentin (NEURONTIN) 300 MG capsule Take 1 capsule by mouth nightly as needed (prn) for up to 30 days.  30 capsule 0    albuterol sulfate HFA (PROVENTIL HFA) 108 (90 Base) MCG/ACT inhaler Inhale 2 puffs into the lungs every 4 hours as needed for Wheezing or Shortness of Breath With spacer (and mask if indicated). Thanks. 1 Inhaler 1     No current facility-administered medications for this visit. [facility]                     ______________________________________________________________________      [address] [place]      1. Admit to preop. 2. Start IV 1000 ml LR at [place] or _____ml/hr for planned conscious sedation     3. May inject 1 % Lidocaine 0.1 ml Intradermal to numb IV site     4. Protime/INR if patient is on Coumadin     5. Urine Pregnancy Test (females only) - 12 -50 years     6. Accu Check Glucose if diabetic. Notify physician if <80 or >250.      7. Sedate all neurotomies          ______________________________________________________________________    POST-OPERATIVE ORDERS - DR. LUNSFORD      1. Admit to Post Op Phase 2     2. Implement Standards of Care for Phase 2 Post Op     3. Check Site - May discharge when site is free of bleeding     4. Discharge to home after meets Phase 2 criteria     5. Discharge cervical patients after 30 minutes and when meets Phase 2 criteria. 6. Give discharge instruction sheet     7. For Diabetic patient, if blood sugar less than 80 in preop,          Recheck blood sugar in Post Op. 8. Discontinue IV     9.  For Nausea may give Zofran 4 MG IV/IM/ODT           ______________________________________________________________________    Latonia Lauth     1950 8/4/20 10:27 AM

## 2020-08-14 ENCOUNTER — HOSPITAL ENCOUNTER (OUTPATIENT)
Dept: CT IMAGING | Age: 70
Discharge: HOME | End: 2020-08-14
Payer: MEDICARE

## 2020-08-14 ENCOUNTER — TELEPHONE (OUTPATIENT)
Dept: ORTHOPEDIC SURGERY | Age: 70
End: 2020-08-14

## 2020-08-14 PROCEDURE — 6360000004 HC RX CONTRAST MEDICATION: Performed by: UROLOGY

## 2020-08-14 PROCEDURE — 74178 CT ABD&PLV WO CNTR FLWD CNTR: CPT

## 2020-08-14 RX ADMIN — IOPAMIDOL 120 ML: 755 INJECTION, SOLUTION INTRAVENOUS at 11:08

## 2020-08-14 NOTE — PROGRESS NOTES
Chief Complaint    Follow-up (LEFT lateral hip pain worse after injection; had 2 PT sessions, w/o relief)      History of Present Illness:  Austyn Dodge is a 78 y.o. female presents to the office today for a follow-up visit. Patient being treated for lumbar radiculopathy and left hip bursitis. Patient did attend a couple sessions of physical therapy. She states that the injection helped for approximately a week and then her symptoms returned. Her pain is concentrated lateral hip with radicular symptoms into her ankle. She denies any weakness and bowel or bladder dysfunction. She does have significant lumbar scoliosis. Pain Assessment  Location of Pain: Pelvis(hip)  Location Modifiers: Left, Lateral, Inferior, Superior  Severity of Pain: 4(currently; at worst (working) 10/10)  Quality of Pain: Dull, Aching  Duration of Pain: Persistent  Frequency of Pain: Constant  Aggravating Factors: Walking, Standing, Bending  Limiting Behavior: Some  Relieving Factors: Rest]       Medical History:  Past Medical History:   Diagnosis Date    Hyperlipidemia     Reflux      There are no active problems to display for this patient. Past Surgical History:   Procedure Laterality Date    ANKLE SURGERY      CHOLECYSTECTOMY      COLONOSCOPY  2005    normal    COLONOSCOPY  03/15/2017    diverticulosis/sigmoid polyp    HYSTERECTOMY      OTHER SURGICAL HISTORY  05/22/2013    cystoscopy urethral dilatation    TUBAL LIGATION       History reviewed. No pertinent family history.   Social History     Socioeconomic History    Marital status:      Spouse name: None    Number of children: None    Years of education: None    Highest education level: None   Occupational History    None   Social Needs    Financial resource strain: None    Food insecurity     Worry: None     Inability: None    Transportation needs     Medical: None     Non-medical: None   Tobacco Use    Smoking status: Current Every Day Smoker Packs/day: 1.00     Years: 56.00     Pack years: 56.00     Types: Cigarettes    Smokeless tobacco: Never Used   Substance and Sexual Activity    Alcohol use: No    Drug use: No    Sexual activity: None   Lifestyle    Physical activity     Days per week: None     Minutes per session: None    Stress: None   Relationships    Social connections     Talks on phone: None     Gets together: None     Attends Mandaen service: None     Active member of club or organization: None     Attends meetings of clubs or organizations: None     Relationship status: None    Intimate partner violence     Fear of current or ex partner: None     Emotionally abused: None     Physically abused: None     Forced sexual activity: None   Other Topics Concern    None   Social History Narrative    None     Current Outpatient Medications   Medication Sig Dispense Refill    omeprazole (PRILOSEC) 20 MG capsule TAKE ONE CAPSULE BY MOUTH ONCE DAILY. 30 capsule 0    simvastatin (ZOCOR) 40 MG tablet TAKE ONE TABLET BY MOUTH EVERY NIGHT AT BEDTIME. 30 tablet 0    albuterol sulfate HFA (PROVENTIL HFA) 108 (90 Base) MCG/ACT inhaler Inhale 2 puffs into the lungs every 4 hours as needed for Wheezing or Shortness of Breath With spacer (and mask if indicated). Thanks. 1 Inhaler 1     No current facility-administered medications for this visit. Review of Systems:  Relevant review of systems reviewed and available in the patient's chart    Vital Signs: There were no vitals filed for this visit. General Exam:   Constitutional: Patient is adequately groomed with no evidence of malnutrition  DTRs: Deep tendon reflexes are intact  Mental Status: The patient is oriented to time, place and person. The patient's mood and affect are appropriate. Lymphatic: The lymphatic examination bilaterally reveals all areas to be without enlargement or induration. Vascular: Examination reveals no swelling or calf tenderness.   Peripheral pulses are Negative

## 2020-08-14 NOTE — TELEPHONE ENCOUNTER
Auth: # [id number]    Date: 08/24/2020 - 11/22/2020  Type of SX:  OP  Location: [facility]  CPT: 57789   DX Code: M54.16   M43.16  M48.062  SX area: Left L4 - L5  Transfoaminal MICAH  Insurance: [employer]

## 2020-08-20 NOTE — H&P
HISTORY AND PHYSICAL/PRE-SEDATION ASSESSMENT    Patient:  Monique Oliver   :  1950  Medical Record No.:  [id number]   Date:  2020  Physician:  César Tran M.D. Facility: [facility]     Nursing History and Physical reviewed and agreed upon. Additional findings:    Allergies:  Dye [barium-containing compounds] and Pcn [penicillins]    Home Medications:    Prior to Admission medications    Medication Sig Start Date End Date Taking? Authorizing Provider   Ibuprofen (ADVIL PO) Take by mouth    Historical Provider, MD   gabapentin (NEURONTIN) 300 MG capsule Take 1 capsule by mouth nightly as needed (prn) for up to 30 days. 20  Anali Yan PA-C   albuterol sulfate HFA (PROVENTIL HFA) 108 (90 Base) MCG/ACT inhaler Inhale 2 puffs into the lungs every 4 hours as needed for Wheezing or Shortness of Breath With spacer (and mask if indicated). Thanks. 17  Aníbal Gibbons MD   omeprazole (PRILOSEC) 20 MG capsule TAKE ONE CAPSULE BY MOUTH ONCE DAILY. 13   GER Monge CNP   simvastatin (ZOCOR) 40 MG tablet TAKE ONE TABLET BY MOUTH EVERY NIGHT AT BEDTIME. 13   GER Monge CNP       Vitals: Stable     PHYSICAL EXAM:  HENT: Airway patent and reviewed  Cardiovascular: Normal rate, regular rhythm, normal heart sounds. Pulmonary/Chest: No wheezes. No rhonchi. No rales. Abdominal: Soft. Bowel sounds are normal. No distension. Mallampati: 2      MALLAMPATI:           []   I. Complete visualization of the soft palate           [x]   II. Complete visualization of the uvula            []   III. Visualization of only the base of the uvula           []   IV. Soft palate is not visible     ASA CLASS:         []   I. Normal, healthy adult           [x]   II.  Mild systemic disease            []   III.   Severe systemic disease      Sedation plan:   [x]  Local              []  Minimal                  [] General anesthesia    Patient's condition acceptable for planned procedure/sedation. Post Procedure Plan   Return to same level of care   ______________________     The risks and benefits as well as alternatives to the procedure have been discussed with the patient and or family. The patient and or next of kin understands and agrees to proceed.     Mercedes Servin M.D.

## 2020-08-24 ENCOUNTER — HOSPITAL ENCOUNTER (OUTPATIENT)
Age: 70
Setting detail: OUTPATIENT SURGERY
Discharge: HOME | End: 2020-08-24
Attending: PHYSICAL MEDICINE & REHABILITATION | Admitting: PHYSICAL MEDICINE & REHABILITATION
Payer: MEDICARE

## 2020-08-24 ENCOUNTER — TELEPHONE (OUTPATIENT)
Dept: ORTHOPEDIC SURGERY | Age: 70
End: 2020-08-24

## 2020-08-24 VITALS
TEMPERATURE: 96.8 F | OXYGEN SATURATION: 94 % | SYSTOLIC BLOOD PRESSURE: 127 MMHG | DIASTOLIC BLOOD PRESSURE: 73 MMHG | HEART RATE: 62 BPM | BODY MASS INDEX: 23.39 KG/M2 | HEIGHT: 64 IN | RESPIRATION RATE: 16 BRPM | WEIGHT: 137 LBS

## 2020-08-24 PROCEDURE — 7100000011 HC PHASE II RECOVERY - ADDTL 15 MIN: Performed by: PHYSICAL MEDICINE & REHABILITATION

## 2020-08-24 PROCEDURE — 6360000002 HC RX W HCPCS: Performed by: PHYSICAL MEDICINE & REHABILITATION

## 2020-08-24 PROCEDURE — 2500000003 HC RX 250 WO HCPCS: Performed by: PHYSICAL MEDICINE & REHABILITATION

## 2020-08-24 PROCEDURE — 7100000010 HC PHASE II RECOVERY - FIRST 15 MIN: Performed by: PHYSICAL MEDICINE & REHABILITATION

## 2020-08-24 PROCEDURE — 3600000002 HC SURGERY LEVEL 2 BASE: Performed by: PHYSICAL MEDICINE & REHABILITATION

## 2020-08-24 PROCEDURE — 6360000004 HC RX CONTRAST MEDICATION: Performed by: PHYSICAL MEDICINE & REHABILITATION

## 2020-08-24 PROCEDURE — 2580000003 HC RX 258: Performed by: PHYSICAL MEDICINE & REHABILITATION

## 2020-08-24 PROCEDURE — 99152 MOD SED SAME PHYS/QHP 5/>YRS: CPT | Performed by: PHYSICAL MEDICINE & REHABILITATION

## 2020-08-24 PROCEDURE — 2709999900 HC NON-CHARGEABLE SUPPLY: Performed by: PHYSICAL MEDICINE & REHABILITATION

## 2020-08-24 RX ORDER — FENTANYL CITRATE 50 UG/ML
INJECTION, SOLUTION INTRAMUSCULAR; INTRAVENOUS
Status: DISCONTINUED
Start: 2020-08-24 | End: 2020-08-24 | Stop reason: WASHOUT

## 2020-08-24 RX ORDER — LIDOCAINE HYDROCHLORIDE 10 MG/ML
INJECTION, SOLUTION EPIDURAL; INFILTRATION; INTRACAUDAL; PERINEURAL PRN
Status: DISCONTINUED | OUTPATIENT
Start: 2020-08-24 | End: 2020-08-24 | Stop reason: ALTCHOICE

## 2020-08-24 RX ORDER — MIDAZOLAM HYDROCHLORIDE 1 MG/ML
INJECTION INTRAMUSCULAR; INTRAVENOUS
Status: DISCONTINUED
Start: 2020-08-24 | End: 2020-08-24 | Stop reason: HOSPADM

## 2020-08-24 RX ORDER — DIPHENHYDRAMINE HCL 25 MG
25 CAPSULE ORAL EVERY 6 HOURS PRN
COMMUNITY
End: 2020-10-05

## 2020-08-24 RX ORDER — MIDAZOLAM HYDROCHLORIDE 1 MG/ML
INJECTION INTRAMUSCULAR; INTRAVENOUS PRN
Status: DISCONTINUED | OUTPATIENT
Start: 2020-08-24 | End: 2020-08-24 | Stop reason: ALTCHOICE

## 2020-08-24 RX ORDER — METHYLPREDNISOLONE ACETATE 40 MG/ML
INJECTION, SUSPENSION INTRA-ARTICULAR; INTRALESIONAL; INTRAMUSCULAR; SOFT TISSUE
Status: DISCONTINUED
Start: 2020-08-24 | End: 2020-08-24 | Stop reason: HOSPADM

## 2020-08-24 RX ORDER — SODIUM CHLORIDE, SODIUM LACTATE, POTASSIUM CHLORIDE, CALCIUM CHLORIDE 600; 310; 30; 20 MG/100ML; MG/100ML; MG/100ML; MG/100ML
INJECTION, SOLUTION INTRAVENOUS CONTINUOUS
Status: DISCONTINUED | OUTPATIENT
Start: 2020-08-24 | End: 2020-08-24 | Stop reason: HOSPADM

## 2020-08-24 RX ADMIN — SODIUM CHLORIDE, POTASSIUM CHLORIDE, SODIUM LACTATE AND CALCIUM CHLORIDE: 600; 310; 30; 20 INJECTION, SOLUTION INTRAVENOUS at 09:40

## 2020-08-24 ASSESSMENT — PAIN - FUNCTIONAL ASSESSMENT
PAIN_FUNCTIONAL_ASSESSMENT: PREVENTS OR INTERFERES SOME ACTIVE ACTIVITIES AND ADLS
PAIN_FUNCTIONAL_ASSESSMENT: 0-10

## 2020-08-24 ASSESSMENT — PAIN DESCRIPTION - DESCRIPTORS: DESCRIPTORS: ACHING

## 2020-08-25 NOTE — POST SEDATION
POST SEDATION ASSESSMENT      Patient:  Ayden Izquierdo   :  1950  Medical Record No.:  [id number]   Date:   2020  Physician:  Maryalice Simmonds, M.D.       Patient location: Recovery  Level of consciousness: Awake, Alert, Oriented  Pain Control: Good  Respiration: Adequate  Post-op assessment: No sedation complications    Last Vitals:   Vitals:    20 1022   BP: 127/73   Pulse: 62   Resp: 16   Temp:    SpO2: 94%     Post-op Vitals: Stable    F Maximiliano Anderson  11:13 AM

## 2020-09-10 ENCOUNTER — OFFICE VISIT (OUTPATIENT)
Dept: ORTHOPEDIC SURGERY | Age: 70
End: 2020-09-10
Payer: MEDICARE

## 2020-09-10 VITALS — BODY MASS INDEX: 23.39 KG/M2 | HEIGHT: 64 IN | WEIGHT: 137 LBS

## 2020-09-10 PROCEDURE — 99213 OFFICE O/P EST LOW 20 MIN: CPT | Performed by: ORTHOPAEDIC SURGERY

## 2020-09-10 NOTE — PROGRESS NOTES
History of present illness:   Ms. Nicky Riley is a pleasant 78 y.o. female with a PMH of reflux and HLD kindly referred by Vivek Uriostegui PA-C/Dr. Daya Nicholson for consultation regarding her left leg pain. She states her pain began insidiously about 2 years ago. Her pain has steadily increased since then. She rates her back pain 7/10 and leg pain 7/10. She describes the pain as sharp, shooting, and aching that is worse with standing, rising from sitting, walking and leaning forward and better with sitting and lying down. The leg pain is primarily in her left lateral hip and extends down to her anterior shin into her great toe. She admits occasional numbness and tingling in her left leg in the same distribution. She denies progressive weakness of her leg and denies bowel or bladder dysfunction. She has tried 3 epidural injections with only a few days relief. Has tried physical therapy and right hip injection as well without relief. She takes gabapentin and ibuprofen. Physical examination:  Ms. Gt Mcarthur most recent vitals: There is no height or weight on file to calculate BMI. General exam:  She is well-developed and well-nourished, is in obvious pain and alert and oriented to person, place, and time. She demonstrates appropriate mood and affect. Her skin is warm and dry. Her gait is normal and she walks heel to toe without limp or instability. Back:  She stands with slight lumbar flexion. Her lumbar flexion, extension and lateral bending are moderately reduced with pain. She has mild tenderness over her lumbar spine without obvious muscle spasm. The skin over her lumbar spine is normal without a surgical scar. Lower extremities:  She has 5/5 motor strength of bilateral lower extremities. She has a negative straight leg raise, bilaterally. Deep tendon reflexes at knees and achilles are 2+. Sensation is intact to light touch L3 to S1 bilaterally. She has no clonus.  Hip range of motion painless. Imaging:  I reviewed MRI images of her lumbar spine from 6/12/20. They note moderate to severe central canal stenosis at L4-5 secondary to spondylolisthesis and degenerative disc bulge, degenerative scoliosis and multilevel facet arthropathy and left paracentral disc herniation L5-S1    Assessment:  Lumbar stenosis with neurogenic claudication  Grade 1 spondylolisthesis   Degenerative scoliosis  Left paracentral disc herniation L5-S1    Plan:  We discussed treatment options including observation, additional oral steroids, physical therapy, epidural injection, microlumbar decompression decompression with spinal fusion. She wishes to proceed with microlumbar decompression L4-5 nad L5-S1. We discussed the risks, benefits, and alternatives to surgery including the risks of nerve or vessel injury, paralysis, spinal blood clot, spinal fluid leak, death, infection, need for additional surgery for recurrent herniation or low back pain, failure of surgery to alleviate her symptoms and worse symptoms following surgery. She understands these risks and wishes to proceed with surgery. Her questions were answered. She was instructed to call us emergently if she begins to experience bowel or bladder dysfunction, saddle anesthesia, increasing muscle weakness, or worsening leg symptoms.

## 2020-10-02 ENCOUNTER — TELEPHONE (OUTPATIENT)
Dept: ORTHOPEDIC SURGERY | Age: 70
End: 2020-10-02

## 2020-10-05 ENCOUNTER — OFFICE VISIT (OUTPATIENT)
Dept: PRIMARY CARE CLINIC | Age: 70
End: 2020-10-05
Payer: MEDICARE

## 2020-10-05 PROCEDURE — 99211 OFF/OP EST MAY X REQ PHY/QHP: CPT | Performed by: NURSE PRACTITIONER

## 2020-10-05 RX ORDER — ACETAMINOPHEN 500 MG
500 TABLET ORAL EVERY 6 HOURS PRN
Status: ON HOLD | COMMUNITY
End: 2020-10-09 | Stop reason: HOSPADM

## 2020-10-05 RX ORDER — LORATADINE 10 MG/1
10 CAPSULE, LIQUID FILLED ORAL DAILY
COMMUNITY
End: 2020-12-22

## 2020-10-05 NOTE — PROGRESS NOTES
Obstructive Sleep Apnea (NELY) Screening     Patient:  Astrid Wan    YOB: 1950      Medical Record #:  [id number]                     Date:  10/5/2020     1. Are you a loud and/or regular snorer? []  Yes       [x] No    2. Have you been observed to gasp or stop breathing during sleep? []  Yes       [x] No    3. Do you feel tired or groggy upon awakening or do you awaken with a headache?           []  Yes       [x] No    4. Are you often tired or fatigued during the wake time hours? []  Yes       [x] No    5. Do you fall asleep sitting, reading, watching TV or driving? []  Yes       [x] No    6. Do you often have problems with memory or concentration? []  Yes       [x] No    **If patient's score is ? 3 they are considered high risk for NELY. An Anesthesia provider will evaluate the patient and develop a plan of care the day of surgery. Note:  If the patient's BMI is more than 35 kg m¯² , has neck circumference > 40 cm, and/or high blood pressure the risk is greater (© American [employer]nea [employer], 2006).

## 2020-10-05 NOTE — PATIENT INSTRUCTIONS

## 2020-10-05 NOTE — PROGRESS NOTES
Cristhian Adan received a viral test for COVID-19. They were educated on isolation and quarantine as appropriate. For any symptoms, they were directed to seek care from their PCP, given contact information to establish with a doctor, directed to an urgent care or the emergency room.

## 2020-10-05 NOTE — PROGRESS NOTES
Preoperative Screening for Elective Surgery/Invasive Procedures While COVID-19 present in the community     Have you had any of the following symptoms? o Fever, chills  o Cough  o Shortness of breath  o Muscle aches/pain  o Diarrhea  o Abdominal pain, nausea, vomiting  o Loss or decrease in taste and / or smell   Risk of Exposure  o Have you recently been hospitalized for COVID-19 or flu-like illness, if so when?  o Recently diagnosed with COVID-19, if so when?  o Recently tested for COVID-19, if so when?  o Have you been in close contact with a person or family member who currently has or recently had COVID-19? If yes, when and in what context?  o Do you live with anybody who in the last 14 days has had fever, chills, shortness of breath, muscle aches, flu-like illness?  o Do you have any close contacts or family members who are currently in the hospital for COVID-19 or flu-like illness? If yes, assess recent close contact with this person. Indicate if the patient has a positive screen by answering yes to one or more of the above questions. Patients who test positive or screen positive prior to surgery or on the day of surgery should be evaluated in conjunction with the surgeon/proceduralist/anesthesiologist to determine the urgency of the procedure.      NO TO ALL ABOVE

## 2020-10-07 LAB — SARS-COV-2, NAA: NOT DETECTED

## 2020-10-08 ENCOUNTER — ANESTHESIA EVENT (OUTPATIENT)
Dept: OPERATING ROOM | Age: 70
End: 2020-10-08
Payer: MEDICARE

## 2020-10-08 NOTE — ANESTHESIA PRE PROCEDURE
Department of Anesthesiology  Preprocedure Note       Name:  David Rodriguez   Age:  78 y.o.  :  1950                                          MRN:  [id number]         Date:  10/8/2020      Surgeon: Yu Murrieta):  Sandee Granados MD    Procedure: Procedure(s): MICROLUMBAR DISCECTOMY, MICROLUMBAR LAMINECTOMY L4-5, L5-S1  CPT CODE - 79953    Medications prior to admission:   Prior to Admission medications    Medication Sig Start Date End Date Taking? Authorizing Provider   acetaminophen (TYLENOL) 500 MG tablet Take 500 mg by mouth every 6 hours as needed for Pain   Yes Historical Provider, MD   loratadine (CLARITIN) 10 MG capsule Take 10 mg by mouth daily   Yes Historical Provider, MD   Ibuprofen (ADVIL PO) Take by mouth    Historical Provider, MD   omeprazole (PRILOSEC) 20 MG capsule TAKE ONE CAPSULE BY MOUTH ONCE DAILY. 13   GER Gomes CNP   simvastatin (ZOCOR) 40 MG tablet TAKE ONE TABLET BY MOUTH EVERY NIGHT AT BEDTIME. 13   GER Gomes CNP       Current medications:    No current facility-administered medications for this encounter. Current Outpatient Medications   Medication Sig Dispense Refill    acetaminophen (TYLENOL) 500 MG tablet Take 500 mg by mouth every 6 hours as needed for Pain      loratadine (CLARITIN) 10 MG capsule Take 10 mg by mouth daily      Ibuprofen (ADVIL PO) Take by mouth      omeprazole (PRILOSEC) 20 MG capsule TAKE ONE CAPSULE BY MOUTH ONCE DAILY. 30 capsule 0    simvastatin (ZOCOR) 40 MG tablet TAKE ONE TABLET BY MOUTH EVERY NIGHT AT BEDTIME. 30 tablet 0       Allergies: Allergies   Allergen Reactions    Dye [Barium-Containing Compounds] Other (See Comments)     DISORIENTED    Pcn [Penicillins] Swelling     RASH       Problem List:  There is no problem list on file for this patient.       Past Medical History:        Diagnosis Date    Allergic sinusitis     ANIMAL ALLERGIES    Back pain     Hyperlipidemia     Reflux Past Surgical History:        Procedure Laterality Date    ANKLE SURGERY Left     ARTHRITIS, BONE SPUR    CHOLECYSTECTOMY      COLONOSCOPY  2005    normal    COLONOSCOPY  03/15/2017    diverticulosis/sigmoid polyp    CYSTOSCOPY Right 09/2020    CYST ON KIDNEY    HYSTERECTOMY      OTHER SURGICAL HISTORY  05/22/2013    cystoscopy urethral dilatation    PAIN MANAGEMENT PROCEDURE Left 7/14/2020    LEFT LUMBAR TWO THREE EPIDURAL STEROID INJECTION SITE CONFIRMED BY FLUOROSCOPY performed by Shana Chiang MD at [address] Left 8/24/2020    LEFT LUMBAR FOUR FIVE EPIDURAL STEROID INJECTION SITE CONFIRMED BY FLUOROSCOPY performed by Shana Chiang MD at [address]         Social History:    Social History     Tobacco Use    Smoking status: Current Every Day Smoker     Packs/day: 1.00     Years: 56.00     Pack years: 56.00     Types: Cigarettes    Smokeless tobacco: Never Used   Substance Use Topics    Alcohol use: No                                Ready to quit: Not Answered  Counseling given: Not Answered      Vital Signs (Current):   Vitals:    10/05/20 1235   Weight: 136 lb (61.7 kg)   Height: 5' 4\" (1.626 m)                                              BP Readings from Last 3 Encounters:   08/24/20 127/73   07/14/20 137/85   11/24/17 132/86       NPO Status:                                                                                 BMI:   Wt Readings from Last 3 Encounters:   09/10/20 137 lb (62.1 kg)   08/24/20 137 lb (62.1 kg)   07/14/20 134 lb (60.8 kg)     Body mass index is 23.34 kg/m².     CBC:   Lab Results   Component Value Date    WBC 8.4 11/17/2014    RBC 4.72 11/17/2014    HGB 14.3 11/17/2014    HCT 43.0 11/17/2014    MCV 90.9 11/17/2014    RDW 12.9 11/17/2014     11/17/2014       CMP:   Lab Results   Component Value Date     11/17/2014    K 3.5 11/17/2014     11/17/2014    CO2 29 11/17/2014    BUN

## 2020-10-09 ENCOUNTER — ANESTHESIA (OUTPATIENT)
Dept: OPERATING ROOM | Age: 70
End: 2020-10-09
Payer: MEDICARE

## 2020-10-09 ENCOUNTER — HOSPITAL ENCOUNTER (OUTPATIENT)
Dept: GENERAL RADIOLOGY | Age: 70
Discharge: HOME | End: 2020-10-09
Attending: ORTHOPAEDIC SURGERY
Payer: MEDICARE

## 2020-10-09 ENCOUNTER — HOSPITAL ENCOUNTER (OUTPATIENT)
Age: 70
Setting detail: OUTPATIENT SURGERY
Discharge: HOME | End: 2020-10-09
Attending: ORTHOPAEDIC SURGERY | Admitting: ORTHOPAEDIC SURGERY
Payer: MEDICARE

## 2020-10-09 VITALS
OXYGEN SATURATION: 99 % | HEART RATE: 74 BPM | BODY MASS INDEX: 23.56 KG/M2 | TEMPERATURE: 97.9 F | WEIGHT: 138 LBS | RESPIRATION RATE: 13 BRPM | SYSTOLIC BLOOD PRESSURE: 133 MMHG | HEIGHT: 64 IN | DIASTOLIC BLOOD PRESSURE: 76 MMHG

## 2020-10-09 VITALS
DIASTOLIC BLOOD PRESSURE: 67 MMHG | RESPIRATION RATE: 17 BRPM | SYSTOLIC BLOOD PRESSURE: 103 MMHG | OXYGEN SATURATION: 98 %

## 2020-10-09 PROCEDURE — 6360000002 HC RX W HCPCS: Performed by: ORTHOPAEDIC SURGERY

## 2020-10-09 PROCEDURE — 7100000011 HC PHASE II RECOVERY - ADDTL 15 MIN: Performed by: ORTHOPAEDIC SURGERY

## 2020-10-09 PROCEDURE — 6360000002 HC RX W HCPCS: Performed by: NURSE ANESTHETIST, CERTIFIED REGISTERED

## 2020-10-09 PROCEDURE — 72100 X-RAY EXAM L-S SPINE 2/3 VWS: CPT

## 2020-10-09 PROCEDURE — 3600000015 HC SURGERY LEVEL 5 ADDTL 15MIN: Performed by: ORTHOPAEDIC SURGERY

## 2020-10-09 PROCEDURE — 2580000003 HC RX 258: Performed by: ANESTHESIOLOGY

## 2020-10-09 PROCEDURE — 6370000000 HC RX 637 (ALT 250 FOR IP): Performed by: ANESTHESIOLOGY

## 2020-10-09 PROCEDURE — 2500000003 HC RX 250 WO HCPCS: Performed by: ORTHOPAEDIC SURGERY

## 2020-10-09 PROCEDURE — 3209999900 FLUORO FOR SURGICAL PROCEDURES

## 2020-10-09 PROCEDURE — 2500000003 HC RX 250 WO HCPCS: Performed by: NURSE ANESTHETIST, CERTIFIED REGISTERED

## 2020-10-09 PROCEDURE — 3700000001 HC ADD 15 MINUTES (ANESTHESIA): Performed by: ORTHOPAEDIC SURGERY

## 2020-10-09 PROCEDURE — 2580000003 HC RX 258: Performed by: ORTHOPAEDIC SURGERY

## 2020-10-09 PROCEDURE — 7100000010 HC PHASE II RECOVERY - FIRST 15 MIN: Performed by: ORTHOPAEDIC SURGERY

## 2020-10-09 PROCEDURE — 3700000000 HC ANESTHESIA ATTENDED CARE: Performed by: ORTHOPAEDIC SURGERY

## 2020-10-09 PROCEDURE — 2720000010 HC SURG SUPPLY STERILE: Performed by: ORTHOPAEDIC SURGERY

## 2020-10-09 PROCEDURE — 7100000000 HC PACU RECOVERY - FIRST 15 MIN: Performed by: ORTHOPAEDIC SURGERY

## 2020-10-09 PROCEDURE — 2709999900 HC NON-CHARGEABLE SUPPLY: Performed by: ORTHOPAEDIC SURGERY

## 2020-10-09 PROCEDURE — 7100000001 HC PACU RECOVERY - ADDTL 15 MIN: Performed by: ORTHOPAEDIC SURGERY

## 2020-10-09 PROCEDURE — 3600000005 HC SURGERY LEVEL 5 BASE: Performed by: ORTHOPAEDIC SURGERY

## 2020-10-09 PROCEDURE — 6360000002 HC RX W HCPCS: Performed by: ANESTHESIOLOGY

## 2020-10-09 RX ORDER — DIPHENHYDRAMINE HYDROCHLORIDE 50 MG/ML
12.5 INJECTION INTRAMUSCULAR; INTRAVENOUS
Status: DISCONTINUED | OUTPATIENT
Start: 2020-10-09 | End: 2020-10-09 | Stop reason: HOSPADM

## 2020-10-09 RX ORDER — PROMETHAZINE HYDROCHLORIDE 25 MG/ML
6.25 INJECTION, SOLUTION INTRAMUSCULAR; INTRAVENOUS
Status: DISCONTINUED | OUTPATIENT
Start: 2020-10-09 | End: 2020-10-09 | Stop reason: HOSPADM

## 2020-10-09 RX ORDER — BUPIVACAINE HYDROCHLORIDE AND EPINEPHRINE 2.5; 5 MG/ML; UG/ML
INJECTION, SOLUTION EPIDURAL; INFILTRATION; INTRACAUDAL; PERINEURAL PRN
Status: DISCONTINUED | OUTPATIENT
Start: 2020-10-09 | End: 2020-10-09 | Stop reason: ALTCHOICE

## 2020-10-09 RX ORDER — LABETALOL HYDROCHLORIDE 5 MG/ML
5 INJECTION, SOLUTION INTRAVENOUS EVERY 10 MIN PRN
Status: DISCONTINUED | OUTPATIENT
Start: 2020-10-09 | End: 2020-10-09 | Stop reason: HOSPADM

## 2020-10-09 RX ORDER — ONDANSETRON 2 MG/ML
4 INJECTION INTRAMUSCULAR; INTRAVENOUS PRN
Status: DISCONTINUED | OUTPATIENT
Start: 2020-10-09 | End: 2020-10-09 | Stop reason: HOSPADM

## 2020-10-09 RX ORDER — OXYCODONE HYDROCHLORIDE AND ACETAMINOPHEN 5; 325 MG/1; MG/1
1 TABLET ORAL PRN
Status: COMPLETED | OUTPATIENT
Start: 2020-10-09 | End: 2020-10-09

## 2020-10-09 RX ORDER — DEXAMETHASONE SODIUM PHOSPHATE 4 MG/ML
INJECTION, SOLUTION INTRA-ARTICULAR; INTRALESIONAL; INTRAMUSCULAR; INTRAVENOUS; SOFT TISSUE PRN
Status: DISCONTINUED | OUTPATIENT
Start: 2020-10-09 | End: 2020-10-09 | Stop reason: SDUPTHER

## 2020-10-09 RX ORDER — SODIUM CHLORIDE, SODIUM LACTATE, POTASSIUM CHLORIDE, CALCIUM CHLORIDE 600; 310; 30; 20 MG/100ML; MG/100ML; MG/100ML; MG/100ML
INJECTION, SOLUTION INTRAVENOUS CONTINUOUS
Status: DISCONTINUED | OUTPATIENT
Start: 2020-10-09 | End: 2020-10-09 | Stop reason: HOSPADM

## 2020-10-09 RX ORDER — PHENYLEPHRINE HCL IN 0.9% NACL 1 MG/10 ML
SYRINGE (ML) INTRAVENOUS PRN
Status: DISCONTINUED | OUTPATIENT
Start: 2020-10-09 | End: 2020-10-09 | Stop reason: SDUPTHER

## 2020-10-09 RX ORDER — OXYCODONE HYDROCHLORIDE AND ACETAMINOPHEN 5; 325 MG/1; MG/1
2 TABLET ORAL PRN
Status: COMPLETED | OUTPATIENT
Start: 2020-10-09 | End: 2020-10-09

## 2020-10-09 RX ORDER — PROPOFOL 10 MG/ML
INJECTION, EMULSION INTRAVENOUS PRN
Status: DISCONTINUED | OUTPATIENT
Start: 2020-10-09 | End: 2020-10-09 | Stop reason: SDUPTHER

## 2020-10-09 RX ORDER — FENTANYL CITRATE 50 UG/ML
INJECTION, SOLUTION INTRAMUSCULAR; INTRAVENOUS PRN
Status: DISCONTINUED | OUTPATIENT
Start: 2020-10-09 | End: 2020-10-09 | Stop reason: SDUPTHER

## 2020-10-09 RX ORDER — LIDOCAINE HYDROCHLORIDE 20 MG/ML
INJECTION, SOLUTION INFILTRATION; PERINEURAL PRN
Status: DISCONTINUED | OUTPATIENT
Start: 2020-10-09 | End: 2020-10-09 | Stop reason: SDUPTHER

## 2020-10-09 RX ORDER — SODIUM CHLORIDE 0.9 % (FLUSH) 0.9 %
10 SYRINGE (ML) INJECTION EVERY 12 HOURS SCHEDULED
Status: DISCONTINUED | OUTPATIENT
Start: 2020-10-09 | End: 2020-10-09 | Stop reason: HOSPADM

## 2020-10-09 RX ORDER — MIDAZOLAM HYDROCHLORIDE 1 MG/ML
INJECTION INTRAMUSCULAR; INTRAVENOUS PRN
Status: DISCONTINUED | OUTPATIENT
Start: 2020-10-09 | End: 2020-10-09 | Stop reason: SDUPTHER

## 2020-10-09 RX ORDER — ONDANSETRON 2 MG/ML
INJECTION INTRAMUSCULAR; INTRAVENOUS PRN
Status: DISCONTINUED | OUTPATIENT
Start: 2020-10-09 | End: 2020-10-09 | Stop reason: SDUPTHER

## 2020-10-09 RX ORDER — MORPHINE SULFATE 2 MG/ML
1 INJECTION, SOLUTION INTRAMUSCULAR; INTRAVENOUS EVERY 5 MIN PRN
Status: DISCONTINUED | OUTPATIENT
Start: 2020-10-09 | End: 2020-10-09 | Stop reason: HOSPADM

## 2020-10-09 RX ORDER — HYDRALAZINE HYDROCHLORIDE 20 MG/ML
5 INJECTION INTRAMUSCULAR; INTRAVENOUS EVERY 10 MIN PRN
Status: DISCONTINUED | OUTPATIENT
Start: 2020-10-09 | End: 2020-10-09 | Stop reason: HOSPADM

## 2020-10-09 RX ORDER — OXYCODONE HYDROCHLORIDE AND ACETAMINOPHEN 5; 325 MG/1; MG/1
1-2 TABLET ORAL
Qty: 40 TABLET | Refills: 0 | Status: SHIPPED | OUTPATIENT
Start: 2020-10-09 | End: 2020-10-16

## 2020-10-09 RX ORDER — LIDOCAINE HYDROCHLORIDE 10 MG/ML
1 INJECTION, SOLUTION EPIDURAL; INFILTRATION; INTRACAUDAL; PERINEURAL
Status: DISCONTINUED | OUTPATIENT
Start: 2020-10-09 | End: 2020-10-09 | Stop reason: HOSPADM

## 2020-10-09 RX ORDER — MORPHINE SULFATE 2 MG/ML
2 INJECTION, SOLUTION INTRAMUSCULAR; INTRAVENOUS EVERY 5 MIN PRN
Status: DISCONTINUED | OUTPATIENT
Start: 2020-10-09 | End: 2020-10-09 | Stop reason: HOSPADM

## 2020-10-09 RX ORDER — DOCUSATE SODIUM 100 MG/1
100 CAPSULE, LIQUID FILLED ORAL 2 TIMES DAILY PRN
Qty: 30 CAPSULE | Refills: 0 | Status: SHIPPED | OUTPATIENT
Start: 2020-10-09 | End: 2020-10-24

## 2020-10-09 RX ORDER — EPHEDRINE SULFATE 50 MG/ML
INJECTION INTRAVENOUS PRN
Status: DISCONTINUED | OUTPATIENT
Start: 2020-10-09 | End: 2020-10-09 | Stop reason: SDUPTHER

## 2020-10-09 RX ORDER — ROCURONIUM BROMIDE 10 MG/ML
INJECTION, SOLUTION INTRAVENOUS PRN
Status: DISCONTINUED | OUTPATIENT
Start: 2020-10-09 | End: 2020-10-09 | Stop reason: SDUPTHER

## 2020-10-09 RX ORDER — SODIUM CHLORIDE 0.9 % (FLUSH) 0.9 %
10 SYRINGE (ML) INJECTION PRN
Status: DISCONTINUED | OUTPATIENT
Start: 2020-10-09 | End: 2020-10-09 | Stop reason: HOSPADM

## 2020-10-09 RX ORDER — MEPERIDINE HYDROCHLORIDE 50 MG/ML
12.5 INJECTION INTRAMUSCULAR; INTRAVENOUS; SUBCUTANEOUS EVERY 5 MIN PRN
Status: DISCONTINUED | OUTPATIENT
Start: 2020-10-09 | End: 2020-10-09 | Stop reason: HOSPADM

## 2020-10-09 RX ADMIN — FENTANYL CITRATE 50 MCG: 50 INJECTION, SOLUTION INTRAMUSCULAR; INTRAVENOUS at 10:13

## 2020-10-09 RX ADMIN — HYDROMORPHONE HYDROCHLORIDE 0.5 MG: 1 INJECTION, SOLUTION INTRAMUSCULAR; INTRAVENOUS; SUBCUTANEOUS at 11:38

## 2020-10-09 RX ADMIN — LIDOCAINE HYDROCHLORIDE 50 MG: 20 INJECTION, SOLUTION INFILTRATION; PERINEURAL at 09:55

## 2020-10-09 RX ADMIN — EPHEDRINE SULFATE 10 MG: 50 INJECTION INTRAVENOUS at 10:54

## 2020-10-09 RX ADMIN — PROPOFOL 120 MG: 10 INJECTION, EMULSION INTRAVENOUS at 09:55

## 2020-10-09 RX ADMIN — ROCURONIUM BROMIDE 40 MG: 10 SOLUTION INTRAVENOUS at 09:55

## 2020-10-09 RX ADMIN — OXYCODONE HYDROCHLORIDE AND ACETAMINOPHEN 2 TABLET: 5; 325 TABLET ORAL at 12:19

## 2020-10-09 RX ADMIN — DEXAMETHASONE SODIUM PHOSPHATE 10 MG: 4 INJECTION, SOLUTION INTRAMUSCULAR; INTRAVENOUS at 10:07

## 2020-10-09 RX ADMIN — MIDAZOLAM HYDROCHLORIDE 2 MG: 2 INJECTION, SOLUTION INTRAMUSCULAR; INTRAVENOUS at 09:51

## 2020-10-09 RX ADMIN — FENTANYL CITRATE 100 MCG: 50 INJECTION, SOLUTION INTRAMUSCULAR; INTRAVENOUS at 09:55

## 2020-10-09 RX ADMIN — SUGAMMADEX 200 MG: 100 INJECTION, SOLUTION INTRAVENOUS at 11:11

## 2020-10-09 RX ADMIN — SODIUM CHLORIDE, SODIUM LACTATE, POTASSIUM CHLORIDE, AND CALCIUM CHLORIDE: .6; .31; .03; .02 INJECTION, SOLUTION INTRAVENOUS at 10:43

## 2020-10-09 RX ADMIN — ONDANSETRON 4 MG: 2 INJECTION INTRAMUSCULAR; INTRAVENOUS at 10:58

## 2020-10-09 RX ADMIN — EPHEDRINE SULFATE 10 MG: 50 INJECTION INTRAVENOUS at 10:42

## 2020-10-09 RX ADMIN — Medication 100 MCG: at 10:00

## 2020-10-09 RX ADMIN — EPHEDRINE SULFATE 10 MG: 50 INJECTION INTRAVENOUS at 10:24

## 2020-10-09 RX ADMIN — EPHEDRINE SULFATE 10 MG: 50 INJECTION INTRAVENOUS at 10:21

## 2020-10-09 RX ADMIN — HYDROMORPHONE HYDROCHLORIDE 0.5 MG: 1 INJECTION, SOLUTION INTRAMUSCULAR; INTRAVENOUS; SUBCUTANEOUS at 11:27

## 2020-10-09 RX ADMIN — EPHEDRINE SULFATE 10 MG: 50 INJECTION INTRAVENOUS at 10:32

## 2020-10-09 RX ADMIN — SODIUM CHLORIDE, SODIUM LACTATE, POTASSIUM CHLORIDE, AND CALCIUM CHLORIDE: .6; .31; .03; .02 INJECTION, SOLUTION INTRAVENOUS at 09:40

## 2020-10-09 RX ADMIN — Medication 100 MCG: at 10:06

## 2020-10-09 RX ADMIN — CEFAZOLIN SODIUM 2 G: 10 INJECTION, POWDER, FOR SOLUTION INTRAVENOUS at 10:02

## 2020-10-09 RX ADMIN — HYDROMORPHONE HYDROCHLORIDE 0.5 MG: 1 INJECTION, SOLUTION INTRAMUSCULAR; INTRAVENOUS; SUBCUTANEOUS at 12:04

## 2020-10-09 ASSESSMENT — PULMONARY FUNCTION TESTS
PIF_VALUE: 16
PIF_VALUE: 16
PIF_VALUE: 15
PIF_VALUE: 15
PIF_VALUE: 17
PIF_VALUE: 16
PIF_VALUE: 16
PIF_VALUE: 15
PIF_VALUE: 16
PIF_VALUE: 0
PIF_VALUE: 17
PIF_VALUE: 16
PIF_VALUE: 0
PIF_VALUE: 2
PIF_VALUE: 10
PIF_VALUE: 17
PIF_VALUE: 17
PIF_VALUE: 16
PIF_VALUE: 16
PIF_VALUE: 15
PIF_VALUE: 15
PIF_VALUE: 16
PIF_VALUE: 15
PIF_VALUE: 10
PIF_VALUE: 16
PIF_VALUE: 10
PIF_VALUE: 0
PIF_VALUE: 16
PIF_VALUE: 15
PIF_VALUE: 17
PIF_VALUE: 16
PIF_VALUE: 0
PIF_VALUE: 15
PIF_VALUE: 12
PIF_VALUE: 17
PIF_VALUE: 16
PIF_VALUE: 15
PIF_VALUE: 16
PIF_VALUE: 15
PIF_VALUE: 3
PIF_VALUE: 17
PIF_VALUE: 16
PIF_VALUE: 16
PIF_VALUE: 15
PIF_VALUE: 17
PIF_VALUE: 18
PIF_VALUE: 17
PIF_VALUE: 11
PIF_VALUE: 17
PIF_VALUE: 17
PIF_VALUE: 16
PIF_VALUE: 14
PIF_VALUE: 16
PIF_VALUE: 14
PIF_VALUE: 15
PIF_VALUE: 17
PIF_VALUE: 16
PIF_VALUE: 2
PIF_VALUE: 17
PIF_VALUE: 16
PIF_VALUE: 16
PIF_VALUE: 1
PIF_VALUE: 16
PIF_VALUE: 15
PIF_VALUE: 16
PIF_VALUE: 16
PIF_VALUE: 1
PIF_VALUE: 16
PIF_VALUE: 2
PIF_VALUE: 17
PIF_VALUE: 14
PIF_VALUE: 15
PIF_VALUE: 16
PIF_VALUE: 20
PIF_VALUE: 15
PIF_VALUE: 16

## 2020-10-09 ASSESSMENT — PAIN SCALES - GENERAL
PAINLEVEL_OUTOF10: 6
PAINLEVEL_OUTOF10: 6
PAINLEVEL_OUTOF10: 9
PAINLEVEL_OUTOF10: 10
PAINLEVEL_OUTOF10: 8

## 2020-10-09 ASSESSMENT — PAIN DESCRIPTION - DESCRIPTORS
DESCRIPTORS: ACHING;CONSTANT
DESCRIPTORS: ACHING
DESCRIPTORS: ACHING

## 2020-10-09 ASSESSMENT — PAIN DESCRIPTION - PAIN TYPE
TYPE: SURGICAL PAIN
TYPE: SURGICAL PAIN

## 2020-10-09 ASSESSMENT — PAIN DESCRIPTION - PROGRESSION: CLINICAL_PROGRESSION: GRADUALLY IMPROVING

## 2020-10-09 ASSESSMENT — PAIN DESCRIPTION - ONSET: ONSET: ON-GOING

## 2020-10-09 ASSESSMENT — PAIN DESCRIPTION - ORIENTATION
ORIENTATION: LOWER
ORIENTATION: LOWER

## 2020-10-09 ASSESSMENT — PAIN DESCRIPTION - LOCATION
LOCATION: BACK
LOCATION: BACK

## 2020-10-09 ASSESSMENT — PAIN DESCRIPTION - FREQUENCY: FREQUENCY: CONTINUOUS

## 2020-10-09 NOTE — H&P
I have reviewed the history and physical and examined the patient and find no relevant changes. I have reviewed with the patient and/or family the risks, benefits, and alternatives to the procedure. The patient was counseled at length about the risks of jared Covid-19 during their perioperative period and any recovery window from their procedure. The patient was made aware that jared Covid-19  may worsen their prognosis for recovering from their procedure  and lend to a higher morbidity and/or mortality risk. All material risks, benefits, and reasonable alternatives including postponing the procedure were discussed. The patient does wish to proceed with the procedure at this time. Kathi Moreno MD  10/9/2020 No intake/output data recorded.

## 2020-10-09 NOTE — PROGRESS NOTES
Preoperative Screening for Elective Surgery/Invasive Procedures While COVID-19 present in the community     Have you had any of the following symptoms? no  o Fever, chills  o Cough  o Shortness of breath  o Muscle aches/pain  o Diarrhea  o Abdominal pain, nausea, vomiting  o Loss or decrease in taste and / or smell   Risk of Exposure none  o Have you recently been hospitalized for COVID-19 or flu-like illness, if so when?  o Recently diagnosed with COVID-19, if so when?  o Recently tested for COVID-19, if so when? Yes - negative 10/5  o Have you been in close contact with a person or family member who currently has or recently had COVID-23? If yes, when and in what context?  o Do you live with anybody who in the last 14 days has had fever, chills, shortness of breath, muscle aches, flu-like illness?  o Do you have any close contacts or family members who are currently in the hospital for COVID-19 or flu-like illness? If yes, assess recent close contact with this person. Indicate if the patient has a positive screen by answering yes to one or more of the above questions. Patients who test positive or screen positive prior to surgery or on the day of surgery should be evaluated in conjunction with the surgeon/proceduralist/anesthesiologist to determine the urgency of the procedure.

## 2020-10-09 NOTE — LETTER
Billing and Coding Fee Ticket    Name:BONNY SANTIZO  : 1950  Diagnosis: degenerative spondylolisthesis L4-5 with stenosis, HNP L5-S1 with radiculopathy  Surgeon: Christopher Murguia  Assistant Conrad Cabot [facility]    DOS: 10/9/20    Procedure:  1. Microlumbar  laminotomy, partial facetectomy, and foraminotomy 53034  2.  Microlumbar laminotomy, partial facetectomy, and partial discectomy L5-S1 83692

## 2020-10-09 NOTE — PROGRESS NOTES
Patient able to eat, drink, ambulate and void without difficulty. Discharge instructions given to [facility] who expressed understanding. Patient pain level is tolerable at discharge at a 5/10. Small amount of drainage on lower edge of dressing noted after patient dressed, has not soaked through dressing only a shadow.

## 2020-10-09 NOTE — ANESTHESIA POSTPROCEDURE EVALUATION
Department of Anesthesiology  Postprocedure Note    Patient: Kyree Sainz  MRN: [id number]  YOB: 1950  Date of evaluation: 10/9/2020  Time:  3:01 PM     Procedure Summary     Date:  10/09/20 Room / Location:  [facility]    Anesthesia Start:  6237 Anesthesia Stop:  1127    Procedure:  MICROLUMBAR DISCECTOMY, MICROLUMBAR LAMINECTOMY L4-5, L5-S1  CPT CODE - 22085 (N/A Spine Lumbar) Diagnosis:       Degenerative spondylolisthesis      (DEGENERATIVE SPONDYLOLISTHESIS)    Surgeon:  Daija Carter MD Responsible Provider:  Domenica Barry MD    Anesthesia Type:  general ASA Status:  2          Anesthesia Type: general    Mehran Phase I: Mehran Score: 8    Mehran Phase II:      Last vitals: Reviewed and per EMR flowsheets.        Anesthesia Post Evaluation    Comments: Postoperative Anesthesia Note    Name:    Kyree Sainz  MRN:      [id number]    Patient Vitals in the past 12 hrs:  10/09/20 1232, BP:133/76, Temp:97.9 °F (36.6 °C), Temp src:Temporal, Pulse:74, Resp:13, SpO2:99 %  10/09/20 1200, BP:131/78, Pulse:86, Resp:12, SpO2:99 %  10/09/20 1145, BP:133/89, Pulse:96, Resp:20, SpO2:98 %  10/09/20 1140, BP:136/78, Pulse:90, Resp:11, SpO2:97 %  10/09/20 1135, BP:122/74, Pulse:90, Resp:10, SpO2:98 %  10/09/20 1130, BP:138/73, Pulse:90, Resp:14, SpO2:98 %  10/09/20 1125, BP:127/74, Temp:97.2 °F (36.2 °C), Temp src:Temporal, Pulse:97, Resp:21, SpO2:96 %  10/09/20 0842, BP:121/89, Temp:97.3 °F (36.3 °C), Temp src:Temporal, Pulse:91, Resp:18, SpO2:97 %, Height:5' 4\" (1.626 m), Weight:138 lb (62.6 kg)     LABS:    CBC  Lab Results       Component                Value               Date/Time                  WBC                      8.4                 11/17/2014 11:59 AM        HGB                      14.3                11/17/2014 11:59 AM        HCT                      43.0                11/17/2014 11:59 AM        PLT                      269                 11/17/2014 11:59 AM RENAL  Lab Results       Component                Value               Date/Time                  NA                       138                 11/17/2014 11:59 AM        K                        3.5                 11/17/2014 11:59 AM        CL                       100                 11/17/2014 11:59 AM        CO2                      29                  11/17/2014 11:59 AM        BUN                      12                  11/17/2014 11:59 AM        CREATININE               0.6                 11/17/2014 11:59 AM        GLUCOSE                  93                  11/17/2014 11:59 AM   COAGS  No results found for: PROTIME, INR, APTT    Intake & Output:  @24HRIO@    Nausea & Vomiting:  No    Level of Consciousness:  Awake    Pain Assessment:  Adequate analgesia    Anesthesia Complications:  No apparent anesthetic complications    SUMMARY      Vital signs stable  OK to discharge from Stage I post anesthesia care.   Care transferred from Anesthesiology department on discharge from perioperative area

## 2020-10-09 NOTE — OP NOTE
Operative Note      Patient: Shannon Moreland  YOB: 1950  MRN: [id number]    Date of Procedure: 10/9/2020    Pre-Op Diagnosis: DEGENERATIVE SPONDYLOLISTHESIS with stenosis L4-5, HNP L5-S1    Post-Op Diagnosis: Same       Procedure(s): MICROLUMBAR DISCECTOMY, MICROLUMBAR LAMINECTOMY L4-5, L5-S1  CPT CODE - 43737    Surgeon(s):  Ban Esteban MD    Assistant:   Surgical Assistant: Sheri Estrada  Physician Assistant: KENNY Hernandez    Anesthesia: General    Estimated Blood Loss (mL): less than 50     Complications: None    Specimens:   * No specimens in log *    Implants:  * No implants in log *      Drains: * No LDAs found *    Findings: spinal stenosis and HNP    Detailed Description of Procedure:      INDICATIONS FOR SURGERY: Palmira Moritz is a 78 y.o. female with back and left leg pain. The symptoms failed to respond to conservative intervention. An MRI scan was performed and this showed evidence of degenerative spondylolisthesis with severe spinal stenosis L4-5 and a left paracentral disc herniation L5-S1. Having failed conservative management and experiencing persistent symptoms, the patient elected to proceed ahead with the surgical option listed above. DETAILS OF PROCEDURE: The patient was brought to the operating room and placed under general anesthesia. The patient was then placed prone on a Jose Juan table. All bony prominences were inspected and padded prior to sterile draping. Using a #10 blade knife, the skin was incised in the midline and monopolar cautery was used to dissect through the subcutaneous tissue to open the fascia and reflect the paraspinal muscles laterally exposing the L4-5 interspace on the left side. The microscope was then brought into the field and used to assist with performing a microsurgical hemilaminotomy, partial facetectomy and foraminotomy. Using the Midas Jason drill, I burred down the hemilamina of L4 and the medial portion of the facet joint.  The

## 2020-10-26 ENCOUNTER — OFFICE VISIT (OUTPATIENT)
Dept: ORTHOPEDIC SURGERY | Age: 70
End: 2020-10-26

## 2020-10-26 VITALS — HEIGHT: 64 IN | BODY MASS INDEX: 23.56 KG/M2 | WEIGHT: 138 LBS

## 2020-10-26 PROCEDURE — 99024 POSTOP FOLLOW-UP VISIT: CPT | Performed by: ORTHOPAEDIC SURGERY

## 2020-10-26 NOTE — PROGRESS NOTES
Ms. Vashti Higuera returns today 2 weeks s/p MLD L4-L5 and L5-S1. She reports marked improvement of her leg symptoms. Her incisions show no signs of infection. She has a normal gait and 5/5 strength of her ankle DFs/PFs, bilaterally. Her sensation is intact from L3 to S1 bilaterally. I cautioned her to avoid lifting more than 10 pounds, bending and impact type aerobic exercise for 8 week after surgery, then slowly increase her activity over the next month.

## 2020-11-04 ENCOUNTER — HOSPITAL ENCOUNTER (OUTPATIENT)
Dept: MAMMOGRAPHY | Age: 70
Discharge: HOME | End: 2020-11-09
Payer: MEDICARE

## 2020-11-04 PROCEDURE — 77063 BREAST TOMOSYNTHESIS BI: CPT

## 2020-11-16 ENCOUNTER — HOSPITAL ENCOUNTER (OUTPATIENT)
Dept: ULTRASOUND IMAGING | Age: 70
Discharge: HOME | End: 2020-11-16
Payer: MEDICARE

## 2020-11-16 PROCEDURE — 76642 ULTRASOUND BREAST LIMITED: CPT

## 2020-11-18 ENCOUNTER — TELEPHONE (OUTPATIENT)
Dept: WOMENS IMAGING | Age: 70
End: 2020-11-18

## 2020-11-19 ENCOUNTER — TELEPHONE (OUTPATIENT)
Dept: WOMENS IMAGING | Age: 70
End: 2020-11-19

## 2020-11-20 ENCOUNTER — TELEPHONE (OUTPATIENT)
Dept: WOMENS IMAGING | Age: 70
End: 2020-11-20

## 2020-11-20 NOTE — TELEPHONE ENCOUNTER
[address]   [address] [place] [address], [address] [place] [address] [address]   Phone: [phone]         ULTRASOUND BIOPSY EDUCATION    NAME:  Kyree Sainz   YOB: 1950   MEDICAL RECORD NUMBER:  [id number]   TODAY'S DATE:  11/20/2020    Referring Physician: Dr. Jonathan Kothari     Procedure: U/S Core Bx    Right breast    Date of biopsy: 12/2    Patient taking blood thinners: no    Medicine allergies: yes - PCN, Dye    Special Instructions: Directions to [facility] given to patient    Biopsy order form faxed to referring MD.          What is an Ultrasound Guided Breast Biopsy? Ultrasound guided breast biopsy is a test that uses ultrasound to find an area of your breast where a tissue sample will be taken. The sample is then looked at under a microscope to check for signs of breast cancer. Why is it done? An Ultrasound biopsy is usually done to check for cancer in a lump or cyst found during a mammogram or ultrasound. Preparing for the test?     * Take your medications as prescribed    You may eat and drink fluids before the test    Take a shower the evening or morning before the biopsy. What happens before the test?  Images are taken to find the exact site to be biopsied.   Your skin is washed with an alcohol prep.   You will be given an injection of medication to numb your breast.   What happens during the test?     Once your breast is numb, a small cut (incision) is made.   Using the imaging, the doctor will guide the needle into the biopsy area.   A sample of breast tissue is taken through the needle.   A small \"Clip\" or Marker is inserted into your breast to rahel the biopsy site.   The needle is removed and pressure put on the needle site to stop any bleeding.   A bandage will be placed over the site.   A post mammogram picture will be taken to document the clip placement. How long does the test take? Approximately 60 minutes. Most of the time is spent finding the area for the biopsy. What are the risks?   Bleeding: You may have some bleeding which can cause bruising, swelling,    or a bleeding under your skin.   Infection:  Signs of infection are redness, swelling, heat, or increasing pain    at the biopsy Site.   Sample size not adequate: This would require repeating the biopsy. What happens after the test?    The nurse will review your post biopsy instructions which include:         Placing an ice pack in your bra.    Wearing a firm fitting bra.    You may use Tylenol (Acetaminiphen) for discomfort. Lab results take about 2-3 business days. The Nurse Navigator or your doctor will call you with the results. Ultrasound Breast Biopsy:     (Please arrive 15 minutes early)                                                 [x] Blood thinner history reviewed with patient    [x] Take all your other medications on your normal routine schedule. [x] You may eat and drink as normal before your biopsy. [x] You may drive yourself. [x] No heavy lifting or exercise for 48 hours after the biopsy. [x] Printed Pre Ultrasound Biopsy Instructions were provided, reviewed with the patient and all questions were answered. [facility] Information:   Should you experience any significant changes in your health or have questions about your care, please contact:  Shu Packer or Evelina Vieyra, Breast Navigator's at [facility]:  [phone]  Monday-Friday. If you need help with your care outside these hours and cannot wait until we are again available, contact your Physician or go to the hospital emergency room. [x] Patient/POA/Caregiver verbalized understanding of instructions.        Electronically signed by Shu Packer RN on 11/20/2020 at 9:01 AM

## 2020-12-02 ENCOUNTER — HOSPITAL ENCOUNTER (OUTPATIENT)
Dept: WOMENS IMAGING | Age: 70
Discharge: HOME | End: 2020-12-02
Payer: MEDICARE

## 2020-12-02 PROCEDURE — 77065 DX MAMMO INCL CAD UNI: CPT

## 2020-12-02 PROCEDURE — A4648 IMPLANTABLE TISSUE MARKER: HCPCS

## 2020-12-02 PROCEDURE — 88360 TUMOR IMMUNOHISTOCHEM/MANUAL: CPT

## 2020-12-02 PROCEDURE — 88305 TISSUE EXAM BY PATHOLOGIST: CPT

## 2020-12-02 NOTE — PROGRESS NOTES
Patient in [address] [place] [address] for breast biopsy. Radiologist reviewed procedure with patient, consent signed. Patient tolerated procedure well. Compression held. Site cleansed with chloraprep, steri strips and dry dressing applied. Ice pack provided. Reviewed discharge instructions with patient. Patient verbalized understanding and agreed to contact the Breast Navigator with any questions. Patient was A&Ox3 and steady on feet and discharged to waiting area. The [address]   Discharge Instructions  [address]  [address]  [address]  Telephone: [phone]    NAME:  Pam Joel:  1950  GENDER: female  MEDICAL RECORD NUMBER:  [id number]  TODAY'S DATE:  12/2/2020    Discharge Instructions Breast Center:    Post Breast Biopsy Instructions     [x] You may remove your outer dressing in 24 hours and you may shower. \"Steri-strips\" tape will stay on for 5-7 days. [x] Place a cold pack inside your bra on top of the dressing for at least 3 hours, removing it every 15 minutes for 15 minutes after your biopsy. [x] Wear a firm fitting bra for at least 24 hours after your biopsy, including while you sleep. [x] Place an ice pack inside your bra on top of the dressing for at least 3 hours, removing it every 15 minutes for 15 minutes after your biopsy. [x] You may resume your held medications tomorrow, unless otherwise directed by your physician. [x] Your physician has instructed you to take Tylenol (Acetaminophen) the day of your biopsy for any discomfort. [x] Watch for excessive bleeding. If bleeding occurs apply pressure to site. Watch for signs of infection, increased pain, redness, swelling and heat. If this occurs call your physician. [x] Do not participate in any strenuous exercise for 48 hours after your biopsy and do not lift, pull or push anything over 5-10 lbs.       [x] Results will be available in 2-3 working days.  Your referring physician or the Nurse Navigator will call you with results. The Breast Center Information:   Should you experience any significant changes in your health or have questions about your care, please contact:  Erika Damian or Starr Broderick, Breast Navigators with [facility]  [phone]  Monday-Friday. If you need help with your care outside these hours and cannot wait until we are again available, contact your Physician or go to the hospital emergency room.         Electronically signed by Erika Damian RN on 12/2/2020 at 11:42 AM

## 2020-12-04 ENCOUNTER — TELEPHONE (OUTPATIENT)
Dept: WOMENS IMAGING | Age: 70
End: 2020-12-04

## 2020-12-04 NOTE — TELEPHONE ENCOUNTER
Pathology for breast biopsy complete, radiologist confirms concordance. Reviewed breast biopsy results with patient and answered all questions. PCP already briefly went over results, but patient had further questions. The radiologist is recommending that the patient see a breast surgeon regarding biopsy results. Patient reports she was given an appointment for Dr. Stephany Murphy. Breast surgeon consult may also be appropriate. Per patient request, referral should be made to [facility] Surgeon, Temi Yang/Jalen. Breast Navigator will remain available for any questions. Pt verbalized understanding.       DANIEL De Los Santos, CN-BM  Patient [address] [place] [address]  [phone]

## 2020-12-08 ASSESSMENT — ENCOUNTER SYMPTOMS: NAUSEA: 1

## 2020-12-08 NOTE — PROGRESS NOTES
Gynecologic History  Menarche at age 12    She delivered her first child at age ***, and did not breastfeed  Postmenopausal at age 29  Hysterectomy at age 29 without BSO  Oral contraceptive use: yes for 12 years and is not currently taking  Hormone use: no and is not currently taking    Bra Size: 45 D        Review of Systems   Constitutional: Negative for chills, fatigue, fever and unexpected weight change. Eyes: Negative for visual disturbance. Respiratory: Negative for apnea, cough, shortness of breath and wheezing. Cardiovascular: Negative for chest pain, palpitations and leg swelling. Gastrointestinal: Positive for nausea. Negative for abdominal distention, abdominal pain and vomiting. Musculoskeletal: Negative for arthralgias, back pain, gait problem and neck pain. Skin: Negative for rash and wound. Neurological: Negative for dizziness, syncope, weakness, numbness and headaches. Hematological: Negative for adenopathy. Bruises/bleeds easily. Psychiatric/Behavioral: Positive for dysphoric mood. Negative for confusion. The patient is not nervous/anxious.

## 2020-12-09 ENCOUNTER — TELEPHONE (OUTPATIENT)
Dept: SURGERY | Age: 70
End: 2020-12-09

## 2020-12-09 NOTE — TELEPHONE ENCOUNTER
Left message for patient to return call, changed her appt from 2:15 to 2:30. Pt appt before her appt overlapped.

## 2020-12-11 ENCOUNTER — TELEPHONE (OUTPATIENT)
Dept: SURGERY | Age: 70
End: 2020-12-11

## 2020-12-11 ENCOUNTER — OFFICE VISIT (OUTPATIENT)
Dept: SURGERY | Age: 70
End: 2020-12-11
Payer: MEDICARE

## 2020-12-11 VITALS
DIASTOLIC BLOOD PRESSURE: 93 MMHG | BODY MASS INDEX: 23.9 KG/M2 | HEIGHT: 64 IN | OXYGEN SATURATION: 97 % | TEMPERATURE: 97.8 F | SYSTOLIC BLOOD PRESSURE: 160 MMHG | RESPIRATION RATE: 18 BRPM | WEIGHT: 140 LBS | HEART RATE: 86 BPM

## 2020-12-11 PROBLEM — Z17.0 MALIGNANT NEOPLASM OF UPPER-INNER QUADRANT OF RIGHT BREAST IN FEMALE, ESTROGEN RECEPTOR POSITIVE (HCC): Status: ACTIVE | Noted: 2020-12-11

## 2020-12-11 PROBLEM — C50.211 MALIGNANT NEOPLASM OF UPPER-INNER QUADRANT OF RIGHT BREAST IN FEMALE, ESTROGEN RECEPTOR POSITIVE (HCC): Status: ACTIVE | Noted: 2020-12-11

## 2020-12-11 PROCEDURE — 99205 OFFICE O/P NEW HI 60 MIN: CPT | Performed by: SURGERY

## 2020-12-11 ASSESSMENT — ENCOUNTER SYMPTOMS
BACK PAIN: 0
VOMITING: 0
COUGH: 0
ABDOMINAL PAIN: 0
SHORTNESS OF BREATH: 0
APNEA: 0
ABDOMINAL DISTENTION: 0
WHEEZING: 0

## 2020-12-11 NOTE — TELEPHONE ENCOUNTER
Pt called to confirm fax number and to make sure that her daughters name on the [facility] paperwork (Noris Hampton) was not going to make the doctor think the [facility] paperwork was for [facility].

## 2020-12-11 NOTE — PROGRESS NOTES
20    CHIEF COMPLAINT:  New diagnosis of right breast cancer. HISTORY OF PRESENT ILLNESS:  Shi Landry is a 78 y.o. woman who requested that I evaluate her for her new diagnosis of right breast cancer. The patient states that she was undergoing her routine mammogram on 2020 when she was alerted to an abnormality in the right breast.  She underwent additional imaging and ultimately a core biopsy, which confirmed an invasive breast cancer. She presents today to discuss further management. The patient states that she herself has not noticed any abnormal masses in either breast.  She denies any change in the appearance of her breasts or the skin of her breasts. She denies bilateral nipple discharge. She has no other systemic complaints and otherwise feels well today.      GYNECOLOGIC HISTORY:  Menarche at age 12    Postmenopausal at age 29  Hysterectomy at age 29 without BSO  Oral contraceptive use: yes for 12 years and is not currently taking  Hormone use: no and is not currently taking    Past Medical History:   Diagnosis Date    Allergic sinusitis     ANIMAL ALLERGIES    Back pain     Hyperlipidemia     Malignant neoplasm of upper-inner quadrant of right breast in female, estrogen receptor positive ([facility] [address] 2020    Reflux      Past Surgical History:   Procedure Laterality Date    ANKLE SURGERY Left     ARTHRITIS, BONE SPUR    CHOLECYSTECTOMY      COLONOSCOPY      normal    COLONOSCOPY  03/15/2017    diverticulosis/sigmoid polyp    CYSTOSCOPY Right 2020    CYST ON KIDNEY    HYSTERECTOMY      LUMBAR SPINE SURGERY N/A 10/9/2020    MICROLUMBAR DISCECTOMY, MICROLUMBAR LAMINECTOMY L4-5, L5-S1  CPT CODE - 38272 performed by Akira Kraus MD at [place] [address]  2013    cystoscopy urethral dilatation    PAIN MANAGEMENT PROCEDURE Left 2020    LEFT LUMBAR TWO THREE EPIDURAL STEROID INJECTION SITE CONFIRMED BY FLUOROSCOPY performed by [facility] Delano Hernandez MD at [address] Left 8/24/2020    LEFT LUMBAR FOUR FIVE EPIDURAL STEROID INJECTION SITE CONFIRMED BY FLUOROSCOPY performed by Paresh Jarquin MD at [address] BREAST NEEDLE BIOPSY RIGHT  12/2/2020    US BREAST NEEDLE BIOPSY RIGHT 12/2/2020 Evita Gonzales MD [facility]     Current Outpatient Medications   Medication Sig Dispense Refill    Ibuprofen (ADVIL PO) Take by mouth      omeprazole (PRILOSEC) 20 MG capsule TAKE ONE CAPSULE BY MOUTH ONCE DAILY. 30 capsule 0    simvastatin (ZOCOR) 40 MG tablet TAKE ONE TABLET BY MOUTH EVERY NIGHT AT BEDTIME. 30 tablet 0    loratadine (CLARITIN) 10 MG capsule Take 10 mg by mouth daily       No current facility-administered medications for this visit.       Allergies   Allergen Reactions    Dye [Barium-Containing Compounds] Other (See Comments)     DISORIENTED- States that this was in her 31's     Pcn [Penicillins] Swelling     RASH     Social History     Socioeconomic History    Marital status:      Spouse name: Not on file    Number of children: Not on file    Years of education: Not on file    Highest education level: Not on file   Occupational History    Not on file   Social Needs    Financial resource strain: Not on file    Food insecurity     Worry: Not on file     Inability: Not on file    Transportation needs     Medical: Not on file     Non-medical: Not on file   Tobacco Use    Smoking status: Current Every Day Smoker     Packs/day: 1.00     Years: 56.00     Pack years: 56.00     Types: Cigarettes    Smokeless tobacco: Never Used    Tobacco comment: States that she i trying to quit/ cutting back   Substance and Sexual Activity    Alcohol use: No    Drug use: No    Sexual activity: Not on file   Lifestyle    Physical activity     Days per week: Not on file     Minutes per session: Not on file    Stress: Not on file   Relationships    Social connections     Talks on phone: Not on file     Gets together: Not on file     Attends Church service: Not on file     Active member of club or organization: Not on file     Attends meetings of clubs or organizations: Not on file     Relationship status: Not on file    Intimate partner violence     Fear of current or ex partner: Not on file     Emotionally abused: Not on file     Physically abused: Not on file     Forced sexual activity: Not on file   Other Topics Concern    Not on file   Social History Narrative    Not on file     Family History   Problem Relation Age of Onset    Cancer Paternal Grandmother     Cancer Maternal Grandfather      REVIEW OF SYSTEMS:   Constitutional: Negative for chills, fatigue, fever and unexpected weight change. Eyes: Negative for visual disturbance. Respiratory: Negative for apnea, cough, shortness of breath and wheezing. Cardiovascular: Negative for chest pain, palpitations and leg swelling. Gastrointestinal: Positive for nausea. Negative for abdominal distention, abdominal pain and vomiting. Musculoskeletal: Negative for arthralgias, back pain, gait problem and neck pain. Skin: Negative for rash and wound. Neurological: Negative for dizziness, syncope, weakness, numbness and headaches. Hematological: Negative for adenopathy. Bruises/bleeds easily. Psychiatric/Behavioral: Positive for dysphoric mood. Negative for confusion. The patient is not nervous/anxious. I personally reviewed and agree with the above ROS as documented by my medical assistant. PHYSICAL EXAMINATION:   Vitals: BP (!) 160/93 (Site: Right Upper Arm, Position: Sitting, Cuff Size: Medium Adult)   Pulse 86   Temp 97.8 °F (36.6 °C) (Infrared)   Resp 18   Ht 5' 4\" (1.626 m)   Wt 140 lb (63.5 kg)   SpO2 97%   BMI 24.03 kg/m²   General: Well-developed, well-nourished, in no apparent distress. Eyes:  Conjunctivae appear normal. Pupils are equal and reactive.  Extraocular movements Finally, we also discussed her personal and family history, the risk of a hereditary cancer syndrome, and the role of genetic counseling and testing. Based on her risk, we will refer her for counseling +/- testing. She understands the genetic results may take several weeks to return and may influence her surgical recommendations. I answered all of her and her family's questions thoroughly, and they do seem pleased with this plan of approach. I encouraged her to contact me in the interim if any new questions or concerns arise.     Summary:  - referral to genetics   - patient has already been referred to medical oncology and has an appointment with Dr. Nela Perry next week  - right needle localized partial mastectomy, right sentinel lymph node biopsy   - patient will need to see her PCP for surgical clearance within 30 days of surgery  - patient was instructed on Covid testing preoperatively  - I will also present her case at our next multi-disciplinary tumor board    Hari Dominguez MD

## 2020-12-11 NOTE — PATIENT INSTRUCTIONS
Patient Education        Lumpectomy: Before Your Surgery  What is a lumpectomy? Breast-conserving surgery (lumpectomy) removes cancer from the breast. Your doctor will make a small cut (incision) and take out the cancer. The whole breast won't be removed. The doctor will try to also take a small amount of normal tissue around the cancer. This is known as \"getting clear margins. \" Some people will need another surgery to be sure the margins are clear. The doctor may also check the nearby lymph nodes during the surgery. After surgery, you will probably go home the same day. Most people can go back to work or their normal routine in 1 to 3 weeks. This depends on how you feel. It also depends on the type of work you do and whether you need more treatment. This may include radiation or chemotherapy. Most people who have this surgery for cancer also get radiation treatment. The surgery will leave scars. Sometimes it leaves a dent in the breast too. Most women will look normal in a bra. But your breasts may not match in size or shape after surgery. This depends on the size of your breasts. It also depends on how much tissue was removed. When you find out that you have cancer, you may feel many emotions and may need some help coping. Seek out family, friends, and counselors for support. You also can do things at home to make yourself feel better while you go through treatment. Call the [facility] (1-593.168.1896) or visit its website at [address] [employer] for more information. Follow-up care is a key part of your treatment and safety. Be sure to make and go to all appointments, and call your doctor if you are having problems. It's also a good idea to know your test results and keep a list of the medicines you take. How do you prepare for surgery? Surgery can be stressful. This information will help you understand what you can expect. And it will help you safely prepare for surgery.   Preparing for surgery    · Be sure you have someone to take you home. Anesthesia and pain medicine will make it unsafe for you to drive or get home on your own.     · Understand exactly what surgery is planned, along with the risks, benefits, and other options.     · If you take aspirin or some other blood thinner, ask your doctor if you should stop taking it before your surgery. Make sure that you understand exactly what your doctor wants you to do. These medicines increase the risk of bleeding.     · Tell your doctor ALL the medicines, vitamins, supplements, and herbal remedies you take. Some may increase the risk of problems during your surgery. Your doctor will tell you if you should stop taking any of them before the surgery and how soon to do it.     · Make sure your doctor and the hospital have a copy of your advance directive. If you don't have one, you may want to prepare one. It lets others know your health care wishes. It's a good thing to have before any type of surgery or procedure. What happens on the day of surgery? · Follow the instructions exactly about when to stop eating and drinking. If you don't, your surgery may be canceled. If your doctor told you to take your medicines on the day of surgery, take them with only a sip of water.     · Take a bath or shower before you come in for your surgery. Do not apply lotions, perfumes, deodorants, or nail polish.     · Do not shave the surgical site yourself.     · Take off all jewelry and piercings. And take out contact lenses, if you wear them.     · Bring a comfortable, supportive bra with you. You will need to wear this all the time, even during the night, for the first week after surgery. At the hospital or surgery center   · Bring a picture ID.     · The area for surgery is often marked to make sure there are no errors. If your doctor can't feel the lump, a needle can be put in the suspicious area. This may be done during a mammogram just before surgery.  The needle will guide your doctor.     · You will be kept comfortable and safe by your anesthesia provider. The anesthesia may make you sleep. Or it may just numb the area being worked on.     · The surgery will take about 1 hour or longer, depending on the size of the lump. When should you call your doctor? · You have questions or concerns.     · You don't understand how to prepare for your surgery.     · You become ill before the surgery (such as fever, flu, or a cold).     · You need to reschedule or have changed your mind about having the surgery. Where can you learn more? Go to https://EventdoopeSezWho.[employer] for [employer]. org and sign in to your [employer] account. Enter [phone] in the [employer] box to learn more about \"Lumpectomy: Before Your Surgery. \"     If you do not have an account, please click on the \"Sign Up Now\" link. Current as of: April 29, 2020               Content Version: 12.6  © 2006-2020 [employer]. Care instructions adapted under license by [facility] ([facility]). If you have questions about a medical condition or this instruction, always ask your healthcare professional. [place] [address] any warranty or liability for your use of this information.

## 2020-12-15 ENCOUNTER — TELEPHONE (OUTPATIENT)
Dept: SURGERY | Age: 70
End: 2020-12-15

## 2020-12-15 RX ORDER — SODIUM CHLORIDE 0.9 % (FLUSH) 0.9 %
10 SYRINGE (ML) INJECTION EVERY 12 HOURS SCHEDULED
Status: CANCELLED | OUTPATIENT
Start: 2020-12-15

## 2020-12-15 RX ORDER — SODIUM CHLORIDE 0.9 % (FLUSH) 0.9 %
10 SYRINGE (ML) INJECTION PRN
Status: CANCELLED | OUTPATIENT
Start: 2020-12-15

## 2020-12-15 NOTE — TELEPHONE ENCOUNTER
Call placed to this patient, reviewed all upcoming appointments including surgery. Patient was able to confirm all dates, states that she has appointment for her physical scheduled. Patient to call this office with any needs.

## 2020-12-18 ENCOUNTER — TELEPHONE (OUTPATIENT)
Dept: SURGERY | Age: 70
End: 2020-12-18

## 2020-12-18 NOTE — TELEPHONE ENCOUNTER
[facility] that patient has decided to wait until after surgery to proceed with genetic testing. Called patient to clarify and discussed options. She does not think genetic testing results will alter her surgical decision, as she really does not want to undergo a mastectomy. I told her this was very reasonable. Will proceed with partial mastectomy on 1/5/2021. [facility] contact to follow up with her mid-late January to get her scheduled with them after surgery.

## 2020-12-18 NOTE — TELEPHONE ENCOUNTER
Pt wants to check on the status of the [facility] paperwork that Dr. Jhonatan Torres was filling out for her daughter. She stated \"Do not call my cell. It's too crazy\". She gave her landline phone [phone].

## 2020-12-18 NOTE — TELEPHONE ENCOUNTER
Dr. Hossein Gallegos will complete paperwork after surgery so that she can fill out completely and correctly. This writer spoke with the provider about this.

## 2020-12-21 NOTE — TELEPHONE ENCOUNTER
Reached out to contact at [facility] advised of Dr. Merrilyn Peabody note. Patient to be contacted in mid January.

## 2020-12-22 RX ORDER — ACETAMINOPHEN 500 MG
500 TABLET ORAL EVERY 6 HOURS PRN
COMMUNITY

## 2020-12-22 RX ORDER — ANASTROZOLE 1 MG/1
1 TABLET ORAL DAILY
COMMUNITY

## 2020-12-22 NOTE — PROGRESS NOTES
Preoperative Screening for Elective Surgery/Invasive Procedures While COVID-19 present in the community    ? Have you had any of the following symptoms? o Fever, chills  o Cough  o Shortness of breath  o Muscle aches/pain  o Diarrhea  o Abdominal pain, nausea, vomiting  o Loss or decrease in taste and / or smell  ? Risk of Exposure  o Have you recently been hospitalized for COVID-19 or flu-like illness, if so when?  o Recently diagnosed with COVID-19, if so when?  o Recently tested for COVID-19, if so when?  o Have you been in close contact with a person or family member who currently has or recently had COVID-23? If yes, when and in what context?  o Do you live with anybody who in the last 14 days has had fever, chills, shortness of breath, muscle aches, flu-like illness?  o Do you have any close contacts or family members who are currently in the hospital for COVID-19 or flu-like illness? If yes, assess recent close contact with this person. Indicate if the patient has a positive screen by answering yes to one or more of the above questions. Patients who test positive or screen positive prior to surgery or on the day of surgery should be evaluated in conjunction with the surgeon/proceduralist/anesthesiologist to determine the urgency of the procedure. No to all questions. Covid test 12/31/20

## 2020-12-23 ENCOUNTER — TELEPHONE (OUTPATIENT)
Dept: SURGERY | Age: 70
End: 2020-12-23

## 2020-12-23 NOTE — TELEPHONE ENCOUNTER
Called and spoke with this patient about upcoming surgery. Patient wanted to confirm that she is not allowed to have visitors in waiting room. Patient also wanted to ask about FMLA paperwork for  her daughter. Confirmed that pt wants to have daughter drive her to radiology appointments. Advised patient that [facility] rads will have to complete paperwork for the radiology portion. Patient wanted to know if Dr. Rolando Martinez would meet face to face with her daughter to discuss FMLA paperwork. Advised patient if the provider needed to speak with her daughter, she would call her.

## 2020-12-23 NOTE — TELEPHONE ENCOUNTER
Vandana Barragan from [facility] PAT ([phone]) called stating pt has questions about surgery with Dr. Raisa Cline scheduled for 1/5/21. Pt can be reached at [phone]. She also reported pt's daughter, Kristopher Roberson, has questions about leave and thinks she has to speak to Dr. Austyn Mitchell about this.  Pt's daughter can be reached at [phone]

## 2020-12-30 ENCOUNTER — HOSPITAL ENCOUNTER (OUTPATIENT)
Age: 70
Discharge: HOME | End: 2020-12-30
Payer: MEDICARE

## 2020-12-30 LAB
EKG ATRIAL RATE: 48 BPM
EKG DIAGNOSIS: NORMAL
EKG P AXIS: 63 DEGREES
EKG P-R INTERVAL: 148 MS
EKG Q-T INTERVAL: 438 MS
EKG QRS DURATION: 92 MS
EKG QTC CALCULATION (BAZETT): 391 MS
EKG R AXIS: 22 DEGREES
EKG T AXIS: 78 DEGREES
EKG VENTRICULAR RATE: 48 BPM

## 2020-12-30 PROCEDURE — 93010 ELECTROCARDIOGRAM REPORT: CPT | Performed by: INTERNAL MEDICINE

## 2020-12-30 PROCEDURE — 93005 ELECTROCARDIOGRAM TRACING: CPT | Performed by: NURSE PRACTITIONER

## 2020-12-31 ENCOUNTER — OFFICE VISIT (OUTPATIENT)
Dept: PRIMARY CARE CLINIC | Age: 70
End: 2020-12-31
Payer: MEDICARE

## 2020-12-31 DIAGNOSIS — Z01.818 PREOP TESTING: Primary | ICD-10-CM

## 2020-12-31 PROCEDURE — 99211 OFF/OP EST MAY X REQ PHY/QHP: CPT | Performed by: NURSE PRACTITIONER

## 2021-01-02 LAB — SARS-COV-2, NAA: NOT DETECTED

## 2021-01-05 ENCOUNTER — HOSPITAL ENCOUNTER (OUTPATIENT)
Dept: NUCLEAR MEDICINE | Age: 71
Discharge: HOME | End: 2021-01-05
Payer: MEDICARE

## 2021-01-05 ENCOUNTER — HOSPITAL ENCOUNTER (OUTPATIENT)
Dept: WOMENS IMAGING | Age: 71
Discharge: HOME | End: 2021-01-05
Payer: MEDICARE

## 2021-01-05 ENCOUNTER — HOSPITAL ENCOUNTER (OUTPATIENT)
Age: 71
Setting detail: OUTPATIENT SURGERY
Discharge: HOME | End: 2021-01-05
Attending: SURGERY | Admitting: SURGERY
Payer: MEDICARE

## 2021-01-05 ENCOUNTER — ANESTHESIA EVENT (OUTPATIENT)
Dept: OPERATING ROOM | Age: 71
End: 2021-01-05
Payer: MEDICARE

## 2021-01-05 ENCOUNTER — ANESTHESIA (OUTPATIENT)
Dept: OPERATING ROOM | Age: 71
End: 2021-01-05
Payer: MEDICARE

## 2021-01-05 VITALS
WEIGHT: 142 LBS | HEART RATE: 75 BPM | RESPIRATION RATE: 12 BRPM | HEIGHT: 63 IN | SYSTOLIC BLOOD PRESSURE: 133 MMHG | DIASTOLIC BLOOD PRESSURE: 71 MMHG | OXYGEN SATURATION: 95 % | TEMPERATURE: 97.2 F | BODY MASS INDEX: 25.16 KG/M2

## 2021-01-05 VITALS
RESPIRATION RATE: 7 BRPM | SYSTOLIC BLOOD PRESSURE: 107 MMHG | TEMPERATURE: 98.6 F | OXYGEN SATURATION: 100 % | DIASTOLIC BLOOD PRESSURE: 59 MMHG

## 2021-01-05 DIAGNOSIS — C50.211 MALIGNANT NEOPLASM OF UPPER-INNER QUADRANT OF RIGHT FEMALE BREAST, UNSPECIFIED ESTROGEN RECEPTOR STATUS (HCC): ICD-10-CM

## 2021-01-05 DIAGNOSIS — C50.211 MALIGNANT NEOPLASM OF UPPER-INNER QUADRANT OF RIGHT BREAST IN FEMALE, ESTROGEN RECEPTOR POSITIVE (HCC): ICD-10-CM

## 2021-01-05 DIAGNOSIS — Z17.0 MALIGNANT NEOPLASM OF UPPER-INNER QUADRANT OF RIGHT BREAST IN FEMALE, ESTROGEN RECEPTOR POSITIVE (HCC): Primary | ICD-10-CM

## 2021-01-05 DIAGNOSIS — C50.211 MALIGNANT NEOPLASM OF UPPER-INNER QUADRANT OF RIGHT BREAST IN FEMALE, ESTROGEN RECEPTOR POSITIVE (HCC): Primary | ICD-10-CM

## 2021-01-05 DIAGNOSIS — Z17.0 MALIGNANT NEOPLASM OF UPPER-INNER QUADRANT OF RIGHT BREAST IN FEMALE, ESTROGEN RECEPTOR POSITIVE (HCC): ICD-10-CM

## 2021-01-05 LAB
EKG ATRIAL RATE: 76 BPM
EKG DIAGNOSIS: NORMAL
EKG P AXIS: 67 DEGREES
EKG P-R INTERVAL: 150 MS
EKG Q-T INTERVAL: 394 MS
EKG QRS DURATION: 96 MS
EKG QTC CALCULATION (BAZETT): 443 MS
EKG R AXIS: 34 DEGREES
EKG T AXIS: 75 DEGREES
EKG VENTRICULAR RATE: 76 BPM

## 2021-01-05 PROCEDURE — 6360000002 HC RX W HCPCS: Performed by: SURGERY

## 2021-01-05 PROCEDURE — 88342 IMHCHEM/IMCYTCHM 1ST ANTB: CPT

## 2021-01-05 PROCEDURE — 7100000000 HC PACU RECOVERY - FIRST 15 MIN: Performed by: SURGERY

## 2021-01-05 PROCEDURE — 88341 IMHCHEM/IMCYTCHM EA ADD ANTB: CPT

## 2021-01-05 PROCEDURE — 6360000002 HC RX W HCPCS: Performed by: ANESTHESIOLOGY

## 2021-01-05 PROCEDURE — 2709999900 MAM NEEDLE BREAST LOCALIZATION RIGHT

## 2021-01-05 PROCEDURE — 2580000003 HC RX 258: Performed by: ANESTHESIOLOGY

## 2021-01-05 PROCEDURE — 3600000015 HC SURGERY LEVEL 5 ADDTL 15MIN: Performed by: SURGERY

## 2021-01-05 PROCEDURE — 93010 ELECTROCARDIOGRAM REPORT: CPT | Performed by: INTERNAL MEDICINE

## 2021-01-05 PROCEDURE — 7100000011 HC PHASE II RECOVERY - ADDTL 15 MIN: Performed by: SURGERY

## 2021-01-05 PROCEDURE — 76098 X-RAY EXAM SURGICAL SPECIMEN: CPT

## 2021-01-05 PROCEDURE — 6360000002 HC RX W HCPCS: Performed by: NURSE ANESTHETIST, CERTIFIED REGISTERED

## 2021-01-05 PROCEDURE — 7100000010 HC PHASE II RECOVERY - FIRST 15 MIN: Performed by: SURGERY

## 2021-01-05 PROCEDURE — 19301 PARTIAL MASTECTOMY: CPT | Performed by: SURGERY

## 2021-01-05 PROCEDURE — 7100000001 HC PACU RECOVERY - ADDTL 15 MIN: Performed by: SURGERY

## 2021-01-05 PROCEDURE — 2709999900 HC NON-CHARGEABLE SUPPLY: Performed by: SURGERY

## 2021-01-05 PROCEDURE — 3600000005 HC SURGERY LEVEL 5 BASE: Performed by: SURGERY

## 2021-01-05 PROCEDURE — 38792 RA TRACER ID OF SENTINL NODE: CPT

## 2021-01-05 PROCEDURE — 88307 TISSUE EXAM BY PATHOLOGIST: CPT

## 2021-01-05 PROCEDURE — 3430000000 HC RX DIAGNOSTIC RADIOPHARMACEUTICAL: Performed by: SURGERY

## 2021-01-05 PROCEDURE — 38900 IO MAP OF SENT LYMPH NODE: CPT | Performed by: SURGERY

## 2021-01-05 PROCEDURE — A9541 TC99M SULFUR COLLOID: HCPCS | Performed by: SURGERY

## 2021-01-05 PROCEDURE — 93005 ELECTROCARDIOGRAM TRACING: CPT | Performed by: ANESTHESIOLOGY

## 2021-01-05 PROCEDURE — 3700000000 HC ANESTHESIA ATTENDED CARE: Performed by: SURGERY

## 2021-01-05 PROCEDURE — 3700000001 HC ADD 15 MINUTES (ANESTHESIA): Performed by: SURGERY

## 2021-01-05 PROCEDURE — 2500000003 HC RX 250 WO HCPCS: Performed by: NURSE ANESTHETIST, CERTIFIED REGISTERED

## 2021-01-05 PROCEDURE — 38525 BIOPSY/REMOVAL LYMPH NODES: CPT | Performed by: SURGERY

## 2021-01-05 DEVICE — Z INACTIVE USE 2641837 CLIP LIG M BLU TI HRT SHP WIRE HORZ 600 PER BX: Type: IMPLANTABLE DEVICE | Site: BREAST | Status: FUNCTIONAL

## 2021-01-05 RX ORDER — EPHEDRINE SULFATE 50 MG/ML
INJECTION INTRAVENOUS PRN
Status: DISCONTINUED | OUTPATIENT
Start: 2021-01-05 | End: 2021-01-05 | Stop reason: SDUPTHER

## 2021-01-05 RX ORDER — DEXAMETHASONE SODIUM PHOSPHATE 4 MG/ML
8 INJECTION, SOLUTION INTRA-ARTICULAR; INTRALESIONAL; INTRAMUSCULAR; INTRAVENOUS; SOFT TISSUE ONCE
Status: COMPLETED | OUTPATIENT
Start: 2021-01-05 | End: 2021-01-05

## 2021-01-05 RX ORDER — SODIUM CHLORIDE 0.9 % (FLUSH) 0.9 %
10 SYRINGE (ML) INJECTION EVERY 12 HOURS SCHEDULED
Status: DISCONTINUED | OUTPATIENT
Start: 2021-01-05 | End: 2021-01-05 | Stop reason: HOSPADM

## 2021-01-05 RX ORDER — DIPHENHYDRAMINE HYDROCHLORIDE 50 MG/ML
6.25 INJECTION INTRAMUSCULAR; INTRAVENOUS
Status: DISCONTINUED | OUTPATIENT
Start: 2021-01-05 | End: 2021-01-05 | Stop reason: HOSPADM

## 2021-01-05 RX ORDER — MIDAZOLAM HYDROCHLORIDE 1 MG/ML
INJECTION INTRAMUSCULAR; INTRAVENOUS PRN
Status: DISCONTINUED | OUTPATIENT
Start: 2021-01-05 | End: 2021-01-05 | Stop reason: SDUPTHER

## 2021-01-05 RX ORDER — OXYCODONE HYDROCHLORIDE AND ACETAMINOPHEN 5; 325 MG/1; MG/1
1 TABLET ORAL EVERY 6 HOURS PRN
Qty: 8 TABLET | Refills: 0 | Status: SHIPPED | OUTPATIENT
Start: 2021-01-05 | End: 2021-01-08

## 2021-01-05 RX ORDER — ONDANSETRON 2 MG/ML
INJECTION INTRAMUSCULAR; INTRAVENOUS PRN
Status: DISCONTINUED | OUTPATIENT
Start: 2021-01-05 | End: 2021-01-05 | Stop reason: SDUPTHER

## 2021-01-05 RX ORDER — PROPOFOL 10 MG/ML
INJECTION, EMULSION INTRAVENOUS PRN
Status: DISCONTINUED | OUTPATIENT
Start: 2021-01-05 | End: 2021-01-05 | Stop reason: SDUPTHER

## 2021-01-05 RX ORDER — HYDRALAZINE HYDROCHLORIDE 20 MG/ML
5 INJECTION INTRAMUSCULAR; INTRAVENOUS EVERY 30 MIN PRN
Status: DISCONTINUED | OUTPATIENT
Start: 2021-01-05 | End: 2021-01-05 | Stop reason: HOSPADM

## 2021-01-05 RX ORDER — ISOSULFAN BLUE 50 MG/5ML
INJECTION, SOLUTION SUBCUTANEOUS PRN
Status: DISCONTINUED | OUTPATIENT
Start: 2021-01-05 | End: 2021-01-05 | Stop reason: ALTCHOICE

## 2021-01-05 RX ORDER — SODIUM CHLORIDE, SODIUM LACTATE, POTASSIUM CHLORIDE, CALCIUM CHLORIDE 600; 310; 30; 20 MG/100ML; MG/100ML; MG/100ML; MG/100ML
INJECTION, SOLUTION INTRAVENOUS CONTINUOUS
Status: DISCONTINUED | OUTPATIENT
Start: 2021-01-05 | End: 2021-01-05 | Stop reason: HOSPADM

## 2021-01-05 RX ORDER — LABETALOL HYDROCHLORIDE 5 MG/ML
5 INJECTION, SOLUTION INTRAVENOUS
Status: DISCONTINUED | OUTPATIENT
Start: 2021-01-05 | End: 2021-01-05 | Stop reason: HOSPADM

## 2021-01-05 RX ORDER — FENTANYL CITRATE 50 UG/ML
INJECTION, SOLUTION INTRAMUSCULAR; INTRAVENOUS PRN
Status: DISCONTINUED | OUTPATIENT
Start: 2021-01-05 | End: 2021-01-05 | Stop reason: SDUPTHER

## 2021-01-05 RX ORDER — LIDOCAINE HYDROCHLORIDE 20 MG/ML
INJECTION, SOLUTION INFILTRATION; PERINEURAL PRN
Status: DISCONTINUED | OUTPATIENT
Start: 2021-01-05 | End: 2021-01-05 | Stop reason: SDUPTHER

## 2021-01-05 RX ORDER — PHENYLEPHRINE HCL IN 0.9% NACL 1 MG/10 ML
SYRINGE (ML) INTRAVENOUS PRN
Status: DISCONTINUED | OUTPATIENT
Start: 2021-01-05 | End: 2021-01-05 | Stop reason: SDUPTHER

## 2021-01-05 RX ORDER — GLYCOPYRROLATE 0.2 MG/ML
INJECTION INTRAMUSCULAR; INTRAVENOUS PRN
Status: DISCONTINUED | OUTPATIENT
Start: 2021-01-05 | End: 2021-01-05 | Stop reason: SDUPTHER

## 2021-01-05 RX ORDER — ONDANSETRON 2 MG/ML
4 INJECTION INTRAMUSCULAR; INTRAVENOUS EVERY 30 MIN PRN
Status: DISCONTINUED | OUTPATIENT
Start: 2021-01-05 | End: 2021-01-05 | Stop reason: HOSPADM

## 2021-01-05 RX ORDER — OXYCODONE HYDROCHLORIDE AND ACETAMINOPHEN 5; 325 MG/1; MG/1
1 TABLET ORAL PRN
Status: DISCONTINUED | OUTPATIENT
Start: 2021-01-05 | End: 2021-01-05 | Stop reason: HOSPADM

## 2021-01-05 RX ORDER — SODIUM CHLORIDE 0.9 % (FLUSH) 0.9 %
10 SYRINGE (ML) INJECTION PRN
Status: DISCONTINUED | OUTPATIENT
Start: 2021-01-05 | End: 2021-01-05 | Stop reason: HOSPADM

## 2021-01-05 RX ORDER — OXYCODONE HYDROCHLORIDE AND ACETAMINOPHEN 5; 325 MG/1; MG/1
2 TABLET ORAL PRN
Status: DISCONTINUED | OUTPATIENT
Start: 2021-01-05 | End: 2021-01-05 | Stop reason: HOSPADM

## 2021-01-05 RX ORDER — MEPERIDINE HYDROCHLORIDE 50 MG/ML
12.5 INJECTION INTRAMUSCULAR; INTRAVENOUS; SUBCUTANEOUS EVERY 5 MIN PRN
Status: DISCONTINUED | OUTPATIENT
Start: 2021-01-05 | End: 2021-01-05 | Stop reason: HOSPADM

## 2021-01-05 RX ADMIN — FENTANYL CITRATE 25 MCG: 50 INJECTION INTRAMUSCULAR; INTRAVENOUS at 11:46

## 2021-01-05 RX ADMIN — Medication 100 MCG: at 11:54

## 2021-01-05 RX ADMIN — EPHEDRINE SULFATE 10 MG: 50 INJECTION INTRAVENOUS at 12:35

## 2021-01-05 RX ADMIN — GLYCOPYRROLATE 0.2 MG: 0.2 INJECTION, SOLUTION INTRAMUSCULAR; INTRAVENOUS at 11:52

## 2021-01-05 RX ADMIN — FENTANYL CITRATE 25 MCG: 50 INJECTION INTRAMUSCULAR; INTRAVENOUS at 12:27

## 2021-01-05 RX ADMIN — SODIUM CHLORIDE, POTASSIUM CHLORIDE, SODIUM LACTATE AND CALCIUM CHLORIDE: 600; 310; 30; 20 INJECTION, SOLUTION INTRAVENOUS at 11:32

## 2021-01-05 RX ADMIN — PROPOFOL 20 MG: 10 INJECTION, EMULSION INTRAVENOUS at 11:40

## 2021-01-05 RX ADMIN — EPHEDRINE SULFATE 10 MG: 50 INJECTION INTRAVENOUS at 12:05

## 2021-01-05 RX ADMIN — MIDAZOLAM HYDROCHLORIDE 2 MG: 2 INJECTION, SOLUTION INTRAMUSCULAR; INTRAVENOUS at 11:32

## 2021-01-05 RX ADMIN — PROPOFOL 130 MG: 10 INJECTION, EMULSION INTRAVENOUS at 11:36

## 2021-01-05 RX ADMIN — FENTANYL CITRATE 25 MCG: 50 INJECTION INTRAMUSCULAR; INTRAVENOUS at 12:17

## 2021-01-05 RX ADMIN — Medication 1000 MICRO CURIE: at 13:39

## 2021-01-05 RX ADMIN — DEXAMETHASONE SODIUM PHOSPHATE 8 MG: 4 INJECTION, SOLUTION INTRAMUSCULAR; INTRAVENOUS at 11:32

## 2021-01-05 RX ADMIN — Medication 100 MCG: at 12:33

## 2021-01-05 RX ADMIN — SODIUM CHLORIDE, POTASSIUM CHLORIDE, SODIUM LACTATE AND CALCIUM CHLORIDE: 600; 310; 30; 20 INJECTION, SOLUTION INTRAVENOUS at 11:08

## 2021-01-05 RX ADMIN — HYDROMORPHONE HYDROCHLORIDE 0.5 MG: 1 INJECTION, SOLUTION INTRAMUSCULAR; INTRAVENOUS; SUBCUTANEOUS at 13:18

## 2021-01-05 RX ADMIN — ONDANSETRON 4 MG: 2 INJECTION INTRAMUSCULAR; INTRAVENOUS at 12:02

## 2021-01-05 RX ADMIN — Medication 100 MCG: at 12:03

## 2021-01-05 RX ADMIN — LIDOCAINE HYDROCHLORIDE 80 MG: 20 INJECTION, SOLUTION INFILTRATION; PERINEURAL at 11:35

## 2021-01-05 RX ADMIN — FENTANYL CITRATE 25 MCG: 50 INJECTION INTRAMUSCULAR; INTRAVENOUS at 11:44

## 2021-01-05 RX ADMIN — EPHEDRINE SULFATE 10 MG: 50 INJECTION INTRAVENOUS at 12:15

## 2021-01-05 RX ADMIN — CEFAZOLIN SODIUM 2 G: 10 INJECTION, POWDER, FOR SOLUTION INTRAVENOUS at 11:32

## 2021-01-05 ASSESSMENT — PULMONARY FUNCTION TESTS
PIF_VALUE: 11
PIF_VALUE: 4
PIF_VALUE: 2
PIF_VALUE: 14
PIF_VALUE: 2
PIF_VALUE: 4
PIF_VALUE: 3
PIF_VALUE: 2
PIF_VALUE: 1
PIF_VALUE: 2
PIF_VALUE: 4
PIF_VALUE: 2
PIF_VALUE: 2
PIF_VALUE: 3
PIF_VALUE: 2
PIF_VALUE: 18
PIF_VALUE: 6
PIF_VALUE: 2

## 2021-01-05 ASSESSMENT — LIFESTYLE VARIABLES: SMOKING_STATUS: 1

## 2021-01-05 ASSESSMENT — PAIN - FUNCTIONAL ASSESSMENT: PAIN_FUNCTIONAL_ASSESSMENT: 0-10

## 2021-01-05 ASSESSMENT — PAIN SCALES - GENERAL: PAINLEVEL_OUTOF10: 7

## 2021-01-05 NOTE — ANESTHESIA POSTPROCEDURE EVALUATION
HCT                      43.0                11/17/2014 11:59 AM        PLT                      269                 11/17/2014 11:59 AM   RENAL  Lab Results       Component                Value               Date/Time                  NA                       138                 11/17/2014 11:59 AM        K                        3.5                 11/17/2014 11:59 AM        CL                       100                 11/17/2014 11:59 AM        CO2                      29                  11/17/2014 11:59 AM        BUN                      12                  11/17/2014 11:59 AM        CREATININE               0.6                 11/17/2014 11:59 AM        GLUCOSE                  93                  11/17/2014 11:59 AM   COAGS  No results found for: PROTIME, INR, APTT    Intake & Output: In: 1650 (P.O.:250; I.V.:800)  Out: -     Nausea & Vomiting:  No    Level of Consciousness:  Awake    Pain Assessment:  Adequate analgesia    Anesthesia Complications:  No apparent anesthetic complications    SUMMARY      Vital signs stable  OK to discharge from Stage I post anesthesia care.   Care transferred from Anesthesiology department on discharge from perioperative area

## 2021-01-05 NOTE — OP NOTE
Operative Note    Nicholas Alvarado  YOB: 1950  MRN: [id number]    PREOPERATIVE DIAGNOSIS  right breast cancer     POSTOPERATIVE DIAGNOSIS  right breast cancer    PROCEDURE  1. Injection of blue dye to the right breast  2. right needle localized partial mastectomy  3. right sentinel lymph node biopsy    ANESTHESIA  General anesthesia. SURGEON  Bard Harris MD     ASSISTANT  Juan Coffey    ESTIMATED BLOOD LOSS  less than 50  ml    COMPLICATIONS  None apparent by completion of procedure    SPECIMENS REMOVED  1. right breast tissue which was marked with a short stitch on the superior margin and a long stitch on the lateral margin. 2. right sentinel lymph nodes     FINDINGS  The right breast tissue was excised using needle localization. Specimen radiograph demonstrated that the lesion and clip were located within the specimen. The right sentinel lymph nodes were identified and were grossly normal.     POST-OP CONDITION  Stable    DISPOSITION  To recovery room    INDICATION FOR PROCEDURE  Nichloas Alvarado is a 78 y.o. woman who was found to have an abnormality in her right breast on imaging. A core biopsy was performed and revealed an invasive right breast cancer in the upper inner breast. I felt that she was an acceptable candidate for attempted breast conservation for which she was highly motivated. She presents today for that purpose as well as a sentinel lymph node biopsy for axillary staging. The indications for the planned procedure, along with the potential benefits and risks which include but are not limited to: anesthetic risk, bleeding, infection, wound complications, sensation changes, unappealing cosmetics, lymphedema, arm numbness, nerve injury, and the need to return to the operating room for a second procedure based on final pathology. All questions were answered, and she agrees to proceed.     DESCRIPTION OF PROCEDURE Gilda Law underwent an image guided localization procedure preoperatively in the radiology department. She was then brought to the operating room and placed supine on the operating room table with her arms extended on boards. She was appropriately positioned and padded. Bilateral sequential compression devices were applied to both lower extremities and a single dose of Ancef was administered intravenously within 60 minutes prior to the incision time. Breast imaging was available in the room. After induction of anesthesia, a timeout procedure was performed. Radioactive colloid was injected into the right breast by the nuclear medicine technician. I then injected 5 ml of isosulfan blue dye in the right retroareolar and upper outer breast and a 5 minute massage was performed. The patient was prepped and draped in the standard surgical fashion. We directed our attention to the right breast.  There was one wire placed by the mammographer marking the right breast lesion. The wire was noted to be entering from a medial aspect. A curvilinear incision was planned inferior and lateral to the wire insertion site. The incision was made and carried down through the dermis with electrocautery. No skin was removed. A anterior flap was created to the level of the wire which was delivered into the wound. The wire was then used to excise tissue anterior, superior, inferior, medial, and lateral to it all the way down to its tip. The tissue was then transected from the posterior attachments. Dissection was not carried to the chest wall. The specimen was then marked with a short stitch on the superior margin and a long stitch on the lateral margin and was sent back to the radiology department. Specimen radiograph demonstrated that the lesion and clip were located within the specimen with adequate radiographic margins. No additional margins were obtained. Attention was then turned to the wound.   Aggressive hemostasis was obtained with electrocautery. The wound was irrigated with copious amounts of sterile saline. 0.5% bupivacaine was infiltrated into the soft tissues surrounding the cavity and hemostasis was again confirmed. Clips were placed in the lumpectomy cavity in the area where the tumor had been located. Hemostasis was again confirmed. Attention was then turned to the right axilla. A curvilinear incision was made at the lower edge of the axillary hairline and carried down through the dermis with electrocautery. The subcutaneous tissues were opened and the clavipectoral fascia was incised. Upon entering the axilla, the gamma probe and blue dye were utilized to guide localization of the sentinel nodes. 1 blue and radioactive lymph node was identified and excised. Ex vivo counts were less than 200. These were then passed off the field. The residual gamma probe activity within the axillary region was minimal.  The axilla was then assessed for other blue channels/nodes and palpably abnormal lymph nodes. All blue nodes, non colored nodes extending from colored lymphatics, radioactive and palpably suspicious nodes were removed. Attention was then turned to the wound. Aggressive hemostasis was obtained with electrocautery. The wound was irrigated with copious amounts of sterile saline. 0.5% bupivacaine was infiltrated into the soft tissues surrounding the cavity and hemostasis was again confirmed. A total of 30 ml was used throughout the procedure. The deep dermal layer was then reapproximated with interrupted 3-0 Vicryl stitches for both incisions. The skin of both incisions was reapproximated with a running subcuticular using 4-0 Monocryl. Surgical glue dressing was applied. The patient tolerated this procedure well, was awakened and transferred to the recovery room. The instrument, sponge, and needle counts were correct. Her family was notified of intraoperative findings. Electronically signed by Heather Alarcon MD on 1/5/21 at 12:56 PM EST

## 2021-01-05 NOTE — PROGRESS NOTES
Pt arrived to pacu from OR in stable condition. VSS. Applied to monitor. Pt denies nausea at this time. Will monitor.

## 2021-01-05 NOTE — H&P
21    CHIEF COMPLAINT:  New diagnosis of right breast cancer. HISTORY OF PRESENT ILLNESS:  Moo Cameron is a 78 y.o. woman who requested that I evaluate her for her new diagnosis of right breast cancer. The patient states that she was undergoing her routine mammogram on 2020 when she was alerted to an abnormality in the right breast.  She underwent additional imaging and ultimately a core biopsy, which confirmed an invasive breast cancer. The patient states that she herself has not noticed any abnormal masses in either breast.  She denies any change in the appearance of her breasts or the skin of her breasts. She denies bilateral nipple discharge. She has no other systemic complaints and otherwise feels well today.      GYNECOLOGIC HISTORY:  Menarche at age 12    Postmenopausal at age 29  Hysterectomy at age 29 without BSO  Oral contraceptive use: yes for 12 years and is not currently taking  Hormone use: no and is not currently taking    Past Medical History:   Diagnosis Date    Allergic sinusitis     ANIMAL ALLERGIES    Back pain     Hyperlipidemia     Malignant neoplasm of upper-inner quadrant of right breast in female, [facility]    Reflux      Past Surgical History:   Procedure Laterality Date    ANKLE SURGERY Left     ARTHRITIS, BONE SPUR    CHOLECYSTECTOMY      COLONOSCOPY      normal    COLONOSCOPY  03/15/2017    diverticulosis/sigmoid polyp    CYSTOSCOPY Right 2020    CYST ON KIDNEY    HYSTERECTOMY      LUMBAR SPINE SURGERY N/A 10/9/2020    MICROLUMBAR DISCECTOMY, MICROLUMBAR LAMINECTOMY L4-5, L5-S1  CPT CODE - 16893 performed by Claire Dupont MD at [facility]. 12.  2013    cystoscopy urethral dilatation    PAIN MANAGEMENT PROCEDURE Left 2020    LEFT LUMBAR TWO THREE EPIDURAL STEROID INJECTION SITE CONFIRMED BY FLUOROSCOPY performed by Vikash Mckeon MD at [place] [address]  PAIN MANAGEMENT PROCEDURE Left 8/24/2020    LEFT LUMBAR FOUR FIVE EPIDURAL STEROID INJECTION SITE CONFIRMED BY FLUOROSCOPY performed by Fernando Gallardo MD at [address] RIGHT  12/2/2020     BREAST NEEDLE BIOPSY RIGHT 12/2/2020 Malena Gould MD [address] [facility]     Current Facility-Administered Medications   Medication Dose Route Frequency Provider Last Rate Last Admin    lactated ringers infusion   Intravenous Continuous Cleola Babinski, MD 50 mL/hr at 01/05/21 1108 New Bag at 01/05/21 1108    lidocaine 1 % (PF) injection 0.1 mL  0.1 mL Intradermal Once PRN Cleola Babinski, MD        ceFAZolin (ANCEF) 2 g in dextrose 5 % 100 mL IVPB  2 g Intravenous On Call to 1709 Aj Ted Hargrove MD       [facility] Dexamethasone Sodium Phosphate injection 8 mg  8 mg Intravenous Once Safia Sheppard MD        sodium chloride flush 0.9 % injection 10 mL  10 mL Intravenous 2 times per day Safia Sheppard MD        sodium chloride flush 0.9 % injection 10 mL  10 mL Intravenous PRN Safia Sheppard MD        meperidine (DEMEROL) injection 12.5 mg  12.5 mg Intravenous Q5 Min PRN Freedom Galicia MD        HYDROmorphone (DILAUDID) injection 0.5 mg  0.5 mg Intravenous Q10 Min PRN Freedom Galicia MD        HYDROmorphone (DILAUDID) injection 0.5 mg  0.5 mg Intravenous Q5 Min PRN Freedom Galicia MD        oxyCODONE-acetaminophen (PERCOCET) 5-325 MG per tablet 1 tablet  1 tablet Oral PRN Freedom Galicia MD        Or    oxyCODONE-acetaminophen (PERCOCET) 5-325 MG per tablet 2 tablet  2 tablet Oral PRN Freedom Galicia MD        [facility]ron [facility] injection 4 mg  4 mg Intravenous Q30 Min PRN Freedom Galicia MD        diphenhydrAMINE (BENADRYL) injection 6.25 mg  6.25 mg Intravenous Once PRN Freedom Galicia MD  labetalol (NORMODYNE;TRANDATE) injection 5 mg  5 mg Intravenous Q15 Min PRN Sangeeta Castillo MD        hydrALAZINE (APRESOLINE) injection 5 mg  5 mg Intravenous Q30 Min PRN Sangeeta Castillo MD         Allergies   Allergen Reactions    Dye [Barium-Containing Compounds] Other (See Comments)     DISORIENTED- States that this was in her 31's     Pcn [Penicillins] Swelling     RASH     Social History     Socioeconomic History    Marital status:      Spouse name: Not on file    Number of children: Not on file    Years of education: Not on file    Highest education level: Not on file   Occupational History    Not on file   Social Needs    Financial resource strain: Not on file    Food insecurity     Worry: Not on file     Inability: Not on file    Transportation needs     Medical: Not on file     Non-medical: Not on file   Tobacco Use    Smoking status: Current Every Day Smoker     Packs/day: 1.00     Years: 56.00     Pack years: 56.00     Types: Cigarettes    Smokeless tobacco: Never Used    Tobacco comment: States that she i trying to quit/ cutting back   Substance and Sexual Activity    Alcohol use: No    Drug use: No    Sexual activity: Not on file   Lifestyle    Physical activity     Days per week: Not on file     Minutes per session: Not on file    Stress: Not on file   Relationships    Social connections     Talks on phone: Not on file     Gets together: Not on file     Attends Rastafarian service: Not on file     Active member of club or organization: Not on file     Attends meetings of clubs or organizations: Not on file     Relationship status: Not on file    Intimate partner violence     Fear of current or ex partner: Not on file     Emotionally abused: Not on file     Physically abused: Not on file     Forced sexual activity: Not on file   Other Topics Concern    Not on file   Social History Narrative    Not on file     Family History   Problem Relation Age of Onset  Cancer Paternal Grandmother     Cancer Maternal Grandfather      REVIEW OF SYSTEMS:   Constitutional: Negative for chills, fatigue, fever and unexpected weight change. Eyes: Negative for visual disturbance. Respiratory: Negative for apnea, cough, shortness of breath and wheezing. Cardiovascular: Negative for chest pain, palpitations and leg swelling. Gastrointestinal: Positive for nausea. Negative for abdominal distention, abdominal pain and vomiting. Musculoskeletal: Negative for arthralgias, back pain, gait problem and neck pain. Skin: Negative for rash and wound. Neurological: Negative for dizziness, syncope, weakness, numbness and headaches. Hematological: Negative for adenopathy. Bruises/bleeds easily. Psychiatric/Behavioral: Positive for dysphoric mood. Negative for confusion. The patient is not nervous/anxious. I personally reviewed and agree with the above ROS as documented by my medical assistant. PHYSICAL EXAMINATION:   Vitals: /68   Pulse 74   Temp 97.9 °F (36.6 °C) (Temporal)   Resp 16   Ht 5' 3\" (1.6 m)   Wt 142 lb (64.4 kg)   SpO2 96%   BMI 25.15 kg/m²   General: Well-developed, well-nourished, in no apparent distress. Eyes:  Conjunctivae appear normal. Pupils are equal and reactive. Extraocular movements are intact. The sclerae are not injected and show no jaundice. Nose, Mouth and Throat: Patient is wearing a mask. Neck: Supple, without thyromegaly or adenopathy. Respiratory: Normal respiratory effort, clear to auscultation bilaterally. Cardiovascular: Regular rate and rhythm. No lower extremity edema. Gastrointestinal: Soft, nontender, nondistended, without obvious masses or hernias. Musculoskeletal: Normal gait and range of motion in all 4 extremities. Psychiatric: Alert and oriented x 3. Appropriate affect and behavior for today's visit. Skin: No concerning rashes, lesions, nodules or other skin changes. Lymphatic System:  No concerning cervical, supraclavicular or axillary lymphadenopathy. Breast Exam:  The breasts are normal in contour. Bra size 38D. Right Breast: Examination of the right breast in the upright and supine positions reveal no obvious masses, skin changes, dimpling, or retraction. The nipple and areola are without erosion, edema or ulceration. There is no obvious nipple discharge. There is no concerning axillary adenopathy. Left Breast: Examination of the left breast in the upright and supine positions reveal no obvious masses, skin changes, dimpling, or retraction. The nipple and areola are without erosion, edema or ulceration. There is no obvious nipple discharge. There is no concerning axillary adenopathy. IMAGING: I personally reviewed the breast imaging performed from November and December 2020 and discussed this with the patient. There is a 6 mm nodule in the right breast at 1:00 8 CFN. She was given a BI-RADS 3/4. Breast density is described as fibroglandular densities. PATHOLOGY:  I reviewed the right breast biopsy pathology from 12/2/2020 with her today as well.   This demonstrated an invasive ductal carcinoma, grade 2, ER positive, RI positive, HER2 negative    IMPRESSION/RECOMMENDATION:  Cancer Staging  Malignant neoplasm of upper-inner quadrant of right breast in female, estrogen receptor positive ([facility]  Staging form: Breast, AJCC 8th Edition  - Clinical: Stage IA (cT1b, cN0, cM0, G2, ER+, RI+, HER2-) - Signed by Jamey Timmons MD on 12/11/2020    - right needle localized partial mastectomy, right sentinel lymph node biopsy The patient was counseled at length about the risks of jared Covid-19 during their perioperative period and any recovery window from their procedure. The patient was made aware that jared Covid-19  may worsen their prognosis for recovering from their procedure  and lend to a higher morbidity and/or mortality risk. All material risks, benefits, and reasonable alternatives including postponing the procedure were discussed. The patient does wish to proceed with the procedure at this time.     Lonny Arias MD

## 2021-01-05 NOTE — ANESTHESIA PRE PROCEDURE
Department of Anesthesiology  Preprocedure Note       Name:  Destiny Cline   Age:  78 y.o.  :  1950                                          MRN:  [id number]         Date:  2021      Surgeon: Dolores Minaya):  Dustin Gee MD    Procedure: Procedure(s):  RIGHT NEEDLE LOCALIZED PARTIAL MASTECTOMY, RIGHT SENTINEL LYMPH NODE BIOPSY  CPT CODE - 23235    Medications prior to admission:   Prior to Admission medications    Medication Sig Start Date End Date Taking? Authorizing Provider   anastrozole (ARIMIDEX) 1 MG tablet Take 1 mg by mouth daily   Yes Historical Provider, MD   acetaminophen (TYLENOL) 500 MG tablet Take 500 mg by mouth every 6 hours as needed for Pain   Yes Historical Provider, MD   Ibuprofen (ADVIL PO) Take by mouth   Yes Historical Provider, MD   omeprazole (PRILOSEC) 20 MG capsule TAKE ONE CAPSULE BY MOUTH ONCE DAILY.  13  Yes Lavena Fothergill, APRN - CNP   simvastatin (ZOCOR) 40 MG tablet TAKE ONE TABLET BY MOUTH EVERY NIGHT AT BEDTIME. 13  Yes Lavena Fothergill, APRN - CNP       Current medications:    Current Facility-Administered Medications   Medication Dose Route Frequency Provider Last Rate Last Admin    lactated ringers infusion   Intravenous Continuous Peggy Agustin MD        lidocaine 1 % (PF) injection 0.1 mL  0.1 mL Intradermal Once PRN Peggy Agustin MD        ceFAZolin (ANCEF) 2 g in dextrose 5 % 100 mL IVPB  2 g Intravenous On Call to [address] MD [facility] Dexamethasone Sodium Phosphate injection 8 mg  8 mg Intravenous Once Dustinchavo Gee MD        sodium chloride flush 0.9 % injection 10 mL  10 mL Intravenous 2 times per day Dustin Gee MD        sodium chloride flush 0.9 % injection 10 mL  10 mL Intravenous PRN Dustin Gee MD        meperidine (DEMEROL) injection 12.5 mg  12.5 mg Intravenous Q5 Min PRN Andree Mccabe MD  HYDROmorphone (DILAUDID) injection 0.5 mg  0.5 mg Intravenous Q10 Min PRN Mellisa Diaz MD        HYDROmorphone (DILAUDID) injection 0.5 mg  0.5 mg Intravenous Q5 Min PRN Mellisa Diaz MD        oxyCODONE-acetaminophen (PERCOCET) 5-325 MG per tablet 1 tablet  1 tablet Oral PRN Mellisa Diaz MD        Or    oxyCODONE-acetaminophen (PERCOCET) 5-325 MG per tablet 2 tablet  2 tablet Oral PRN Mellisa Diaz MD        ondansetron [facility]) injection 4 mg  4 mg Intravenous Q30 Min PRN Mellisa Diaz MD        diphenhydrAMINE (BENADRYL) injection 6.25 mg  6.25 mg Intravenous Once PRN Mellisa Diaz MD        labetalol (NORMODYNE;TRANDATE) injection 5 mg  5 mg Intravenous Q15 Min PRN Mellisa Diaz MD        hydrALAZINE (APRESOLINE) injection 5 mg  5 mg Intravenous Q30 Min PRN Mellisa Diaz MD           Allergies:     Allergies   Allergen Reactions    Dye [Barium-Containing Compounds] Other (See Comments)     DISORIENTED- States that this was in her 31's     Pcn [Penicillins] Swelling     RASH       Problem List:    Patient Active Problem List   Diagnosis Code    Malignant neoplasm of upper-inner quadrant of right breast in female, estrogen receptor positive ([facility] C50.211, Z17.0       Past Medical History:        Diagnosis Date    Allergic sinusitis     ANIMAL ALLERGIES    Back pain     Hyperlipidemia     Malignant neoplasm of upper-inner quadrant of right breast in female, [facility] 12/11/[facility]    Reflux        Past Surgical History:        Procedure Laterality Date    ANKLE SURGERY Left     ARTHRITIS, BONE SPUR    CHOLECYSTECTOMY      COLONOSCOPY  2005    normal    COLONOSCOPY  03/15/2017    diverticulosis/sigmoid polyp    CYSTOSCOPY Right 09/2020    CYST ON KIDNEY    HYSTERECTOMY      LUMBAR SPINE SURGERY N/A 10/9/2020 MICROLUMBAR DISCECTOMY, MICROLUMBAR LAMINECTOMY L4-5, L5-S1  CPT CODE - 82829 performed by Juan Gregory MD at [address]  [facility]  05/22/2013    cystoscopy urethral dilatation    PAIN MANAGEMENT PROCEDURE Left 7/14/2020    LEFT LUMBAR TWO THREE EPIDURAL STEROID INJECTION SITE CONFIRMED BY FLUOROSCOPY performed by Mirian Lopez MD at [address] Left 8/24/2020    LEFT LUMBAR FOUR FIVE EPIDURAL STEROID INJECTION SITE CONFIRMED BY FLUOROSCOPY performed by Mirian Lopez MD at [address] RIGHT  12/2/2020    US BREAST NEEDLE BIOPSY RIGHT 12/2/2020 Murphy Canada MD [address]       Social History:    Social History     Tobacco Use    Smoking status: Current Every Day Smoker     Packs/day: 1.00     Years: 56.00     Pack years: 56.00     Types: Cigarettes    Smokeless tobacco: Never Used    Tobacco comment: States that she i trying to quit/ cutting back   Substance Use Topics    Alcohol use:  No                                Ready to quit: Not Answered  Counseling given: Not Answered  Comment: States that she i trying to quit/ cutting back      Vital Signs (Current):   Vitals:    12/22/20 1439 01/05/21 0905   BP:  125/68   Pulse:  74   Resp:  16   Temp:  97.9 °F (36.6 °C)   TempSrc:  Temporal   SpO2:  96%   Weight: 142 lb (64.4 kg)    Height: 5' 3\" (1.6 m)                                               BP Readings from Last 3 Encounters:   01/05/21 125/68   12/11/20 (!) 160/93   10/09/20 103/67       NPO Status: Time of last liquid consumption: 2330                        Time of last solid consumption: 2330                        Date of last liquid consumption: 01/04/21                        Date of last solid food consumption: 01/04/21    BMI:   Wt Readings from Last 3 Encounters:   12/22/20 142 lb (64.4 kg)   12/11/20 140 lb (63.5 kg)   10/26/20 138 lb (62.6 kg) Body mass index is 25.15 kg/m². CBC:   Lab Results   Component Value Date    WBC 8.4 11/17/2014    RBC 4.72 11/17/2014    HGB 14.3 11/17/2014    HCT 43.0 11/17/2014    MCV 90.9 11/17/2014    RDW 12.9 11/17/2014     11/17/2014       CMP:   Lab Results   Component Value Date     11/17/2014    K 3.5 11/17/2014     11/17/2014    CO2 29 11/17/2014    BUN 12 11/17/2014    CREATININE 0.6 11/17/2014    GFRAA >60 11/17/2014    AGRATIO 1.2 11/17/2014    LABGLOM >60 11/17/2014    GLUCOSE 93 11/17/2014    PROT 7.8 11/17/2014    CALCIUM 10.2 11/17/2014    BILITOT 0.3 11/17/2014    ALKPHOS 68 11/17/2014    AST 8 11/17/2014    ALT 10 11/17/2014       POC Tests: No results for input(s): POCGLU, POCNA, POCK, POCCL, POCBUN, POCHEMO, POCHCT in the last 72 hours. Coags: No results found for: PROTIME, INR, APTT    HCG (If Applicable): No results found for: PREGTESTUR, PREGSERUM, HCG, HCGQUANT     ABGs: No results found for: PHART, PO2ART, SMA3GCJ, RUX3PRE, BEART, B9PLKUMN     Type & Screen (If Applicable):  No results found for: LABABO, LABRH    Drug/Infectious Status (If Applicable):  No results found for: HIV, HEPCAB    COVID-19 Screening (If Applicable):   Lab Results   Component Value Date    COVID19 NOT DETECTED 12/31/2020         Anesthesia Evaluation  Patient summary reviewed and Nursing notes reviewed no history of anesthetic complications:   Airway: Mallampati: II     Neck ROM: full   Dental:          Pulmonary:   (+) current smoker                           Cardiovascular:                      Neuro/Psych:               GI/Hepatic/Renal:             Endo/Other:                     Abdominal:           Vascular:                                      Anesthesia Plan      general     ASA 3       Induction: intravenous. Anesthetic plan and risks discussed with patient. Plan discussed with CRNA.                   Jenny Prajapati MD   1/5/2021

## 2021-01-07 ENCOUNTER — TELEPHONE (OUTPATIENT)
Dept: SURGERY | Age: 71
End: 2021-01-07

## 2021-01-07 NOTE — TELEPHONE ENCOUNTER
Called patient. She has a very small amount of clear drainage at a pinpoint hole next to her incision (likely from the suture or needle entry site). She denies redness or purulent drainage. I instructed her to keep the area clean and dry and it should seal on its own in a day or so. She also asked about some numbness she is having around her axillary incision which I instructed her was normal and may return with time. All questions answered. She knows to call back if she has worsening symptoms or any further questions.

## 2021-01-15 ENCOUNTER — OFFICE VISIT (OUTPATIENT)
Dept: SURGERY | Age: 71
End: 2021-01-15

## 2021-01-15 VITALS — BODY MASS INDEX: 24.8 KG/M2 | WEIGHT: 140 LBS | HEIGHT: 63 IN | TEMPERATURE: 97.2 F | RESPIRATION RATE: 16 BRPM

## 2021-01-15 DIAGNOSIS — C50.211 MALIGNANT NEOPLASM OF UPPER-INNER QUADRANT OF RIGHT BREAST IN FEMALE, ESTROGEN RECEPTOR POSITIVE (HCC): Primary | ICD-10-CM

## 2021-01-15 DIAGNOSIS — Z17.0 MALIGNANT NEOPLASM OF UPPER-INNER QUADRANT OF RIGHT BREAST IN FEMALE, ESTROGEN RECEPTOR POSITIVE (HCC): Primary | ICD-10-CM

## 2021-01-15 PROCEDURE — 99024 POSTOP FOLLOW-UP VISIT: CPT | Performed by: SURGERY

## 2021-01-15 NOTE — PROGRESS NOTES
01/15/21     CHIEF COMPLAINT:  Post-operative visit. HISTORY OF PRESENT ILLNESS:  Ashley Liu is a 78 y.o. woman who presented to my office for evaluation of her new diagnosis of right breast cancer. The patient was undergoing her routine mammogram on 11/4/2020 when she was alerted to an abnormality. She underwent additional imaging and ultimately a core biopsy, which confirmed an invasive breast cancer. She presented to discuss further management. Based on the clinical and imaging findings, I did feel that she was an acceptable candidate for attempted breast conservation, for which she was highly motivated. She underwent a right partial mastectomy and sentinel lymph node biopsy on 1/5/2021 and returns today for her post-operative visit. The patient states that she has done well post-operatively. She denies any problems with the incisions. She denies any redness around the incisions or drainage from the wounds. She has no other systemic complaints and has not had any fevers. PHYSICAL EXAMINATION:     BREAST EXAMINATION: On examination, the incisions are clean and dry. The medial aspect of the breast incision is widened. There is no sign of any infection or drainage from the wounds. There is a lymphocele in the right axilla with no surrounding erythema.     PATHOLOGY:    1/5/2021 right partial mastectomy and sentinel lymph node biopsy: invasive ductal carcinoma, 6 mm, grade 1, negative margins, ER positive, DE positive, HER2 negative, 0 of 1 lymph nodes with evidence of metastatic involvement      IMPRESSION/RECOMMENDATION:    Cancer Staging  Malignant neoplasm of upper-inner quadrant of right breast in female, estrogen receptor positive ([facility] [address]  Staging form: Breast, AJCC 8th Edition  - Clinical: Stage IA (cT1b, cN0, cM0, G2, ER+, DE+, HER2-) - Signed by Bhavya Atkinson MD on 12/11/2020  - Pathologic stage from 1/15/2021: Stage IA (pT1b, pN0(sn), cM0, G1, ER+, DE+, HER2-) - Signed by Angelito Castellanos MD Celia on 1/15/2021    S/p right partial mastectomy and sentinel lymph node biopsy on 1/5/2021    Nishi Fox is a 78 y.o. woman who is now status-post right partial mastectomy and sentinel lymph node biopsy for a 0.6 cm invasive ductal carcinoma. I reviewed the pathology with her today and explained that I do consider her surgical therapy to be complete as we were able to achieve negative margins and her sentinel lymph node was negative. She was given a copy of her pathology report. After obtaining verbal consent and discussing the risks, benefits, and alternatives, I did perform a right axillary lymphocele aspiration in the office using an 18 gauge needle. I aspirated 20 ml of serous fluid with full resolution of the lymphocele. I would like to see her back next week for a re-check of this area. I encouraged her to contact me in the interim if any new questions or concerns arise. I also cleaned around the breast incision and placed a steri strip to loosely re-approximate the medial aspect of the incision. The patient is still smoking and was counseled on the effects smoking has on wound healing as well as smoking cessation. We also discussed adjuvant therapy. We reviewed that radiation therapy is recommended in patients undergoing breast conservation therapy to reduce the risk of local recurrence. I will arrange for a radiation oncology consultation to discuss this further. Systemic therapy including chemotherapy and endocrine therapy were reviewed. She will certainly be a candidate for endocrine therapy. I will arrange for a medical oncology consultation to discuss this further. Finally, we also discussed her personal and family history, the risk of a hereditary cancer syndrome, and the role of genetic counseling and testing. Based on her risk, Previously referred her for genetic counseling and testing however she has since changed her mind and declines this.       I would like to see her back in six months, for a clinical breast exam, and then annually with her mammograms. In the interim, I encouraged her to resume self examinations on a monthly basis and to alert me of any changes. I answered all of her questions thoroughly, and she does seem pleased with this plan of approach. I encouraged her to contact me in the interim if any new questions or concerns arise.     Summary:  - follow up in 1 week for wound check  - referral to radiation oncology, follow up with medical oncology  - f/u in 6 months for clinical exam  - she will be due for bilateral mammogams on 11/5/2021    Kimani Rodgers MD

## 2021-01-15 NOTE — PATIENT INSTRUCTIONS
system fight the cancer. What surgeries are done for breast cancer? Surgery is a key part of treatment for breast cancer. The main types of surgeries are:  · Breast-conserving surgery, such as lumpectomy. It removes the cancer in the breast and just enough tissue to make sure that all of the cancer was removed. · Surgery to remove the breast. This includes:  ? Total mastectomy. This removes the whole breast.  ? Nipple-sparing mastectomy. This removes the whole breast but leaves the nipple. ? Modified radical mastectomy. This removes the whole breast and the lymph nodes under the arm (axillary lymph nodes). ? Radical mastectomy. This removes the whole breast, the chest muscles, and all the lymph nodes under the arm. If lymph nodes under the arm are removed, they are looked at under the microscope to check for cancer. There are two types of lymph node removal:  · [place] lymph node biopsy removes the lymph node that is the first to receive lymph fluid from the tumor. If cancer has spread from the breast to the lymph nodes, cancer cells most often show up in the sentinel node first.  · Axillary lymph node dissection removes most of the lymph nodes in the armpit. The type of surgery you have depends on the size, location, and type of the cancer. It also depends on your overall health and personal preferences. Even if your doctor removes all the cancer that can be seen at the time of your surgery, you may still need more treatment. You may get radiation, chemotherapy, or hormone therapy after surgery to try to kill any cancer cells that may be left. No matter what kind of surgery you have, you will get information about why you are having it, what its risks are, how to prepare, and what to do after surgery. Follow-up care is a key part of your treatment and safety. Be sure to make and go to all appointments, and call your doctor if you are having problems.  It's also a good idea to know your test results and keep a list of the medicines you take. Where can you learn more? Go to https://[email or web address].[email or web address] org and sign in to your [employer] account. Enter D545 in the [employer] box to learn more about \"Learning About Breast Cancer Treatments. \"     If you do not have an account, please click on the \"Sign Up Now\" link. Current as of: April 29, 2020               Content Version: 12.6  © 2006-2020 [employer]. Care instructions adapted under license by [facility] ([facility]). If you have questions about a medical condition or this instruction, always ask your healthcare professional. [address] any warranty or liability for your use of this information.

## 2021-01-18 ENCOUNTER — OFFICE VISIT (OUTPATIENT)
Dept: SURGERY | Age: 71
End: 2021-01-18

## 2021-01-18 VITALS — BODY MASS INDEX: 24.8 KG/M2 | HEIGHT: 63 IN | RESPIRATION RATE: 16 BRPM | WEIGHT: 140 LBS | TEMPERATURE: 97.2 F

## 2021-01-18 DIAGNOSIS — Z17.0 MALIGNANT NEOPLASM OF UPPER-INNER QUADRANT OF RIGHT BREAST IN FEMALE, ESTROGEN RECEPTOR POSITIVE (HCC): Primary | ICD-10-CM

## 2021-01-18 DIAGNOSIS — C50.211 MALIGNANT NEOPLASM OF UPPER-INNER QUADRANT OF RIGHT BREAST IN FEMALE, ESTROGEN RECEPTOR POSITIVE (HCC): Primary | ICD-10-CM

## 2021-01-18 PROCEDURE — 99024 POSTOP FOLLOW-UP VISIT: CPT | Performed by: SURGERY

## 2021-01-18 RX ORDER — SULFAMETHOXAZOLE AND TRIMETHOPRIM 800; 160 MG/1; MG/1
1 TABLET ORAL 2 TIMES DAILY
Qty: 10 TABLET | Refills: 0 | Status: SHIPPED | OUTPATIENT
Start: 2021-01-18 | End: 2021-01-23

## 2021-01-18 NOTE — PROGRESS NOTES
01/18/21     CHIEF COMPLAINT:  Post-operative lymphocele     HISTORY OF PRESENT ILLNESS:  Moo Cameron is a 78 y.o. woman who is status-post right partial mastectomy and right sentinel lymph node biopsy on 1/5/2021 for a right breast cancer. She has developed a right axillary lymphocele which I aspirated 20 ml in the office on 1/15/2021. She presents today because she believes it has re-accumulated. She denies any redness around or drainage from the axillary incision. Previously the medial aspect of the breast incision was noted to be opening slightly and was loosely reapproximated with a steri-strip which she states fell off the same day. She denies drainage from the breast wound, but states there is slight redness around it, although she has also been trying to keep it covered with a bandaid which she believes has irritated the skin. She has no other systemic complaints and has not had any fevers. PHYSICAL EXAMINATION:     BREAST EXAMINATION: On examination, the axillary incision is clean, dry, and intact and healing well. There is no sign of any infection or drainage from the wound. There is a lymphocele in the right axilla. The medial aspect of the breast incision is slightly dehisced with exposed subcutaneous fat. There is a small amount of erythema surrounding the incision. There is no drainage. IMPRESSION/RECOMMENDATION:    Cancer Staging  Malignant neoplasm of upper-inner quadrant of right breast in female, estrogen receptor positive ([facility] [address]  Staging form: Breast, AJCC 8th Edition  - Clinical: Stage IA (cT1b, cN0, cM0, G2, ER+, LA+, HER2-) - Signed by Elizabeth Gil MD on 12/11/2020  - Pathologic stage from 1/15/2021: Stage IA (pT1b, pN0(sn), cM0, G1, ER+, LA+, HER2-) - Signed by Elizabeth Gil MD on 1/15/2021    Moo Cameron is a 78 y.o. woman who is now status-post right partial mastectomy and sentinel lymph node biopsy for a right breast cancer.   She has developed a right axillary lymphocele. After obtaining verbal consent and discussing the risks, benefits, and alternatives, I did perform a right axillary lymphocele aspiration in the office using an 18 gauge needle. I aspirated 30 ml of serous fluid under ultrasound guidance with full resolution of the lymphocele. I would like to see her back later this week for a re-check of this area. I encouraged her to contact me in the interim if any new questions or concerns arise. Due to the slight dehiscence of the medial aspect of the breast incision     I have prescribed a 5 day course of antibiotics for concern of developing infection of the right breast incision. The medial aspect of the incision will need to heal by secondary intention, but is too small to pack, so I instructed hr on local wound care. Otherwise, I will plan to see her for routine follow up in 6 months. In the interim, I encouraged her to resume self examinations on a monthly basis and to alert me of any changes. I answered all of her questions thoroughly, and she does seem pleased with this plan of approach. I encouraged her to contact me in the interim if any new questions or concerns arise. Summary:  - follow up later this week for a re-check of this area.     - f/u in 6 months for clinical exam  - she will be due for bilateral mammogams on 11/5/2021    Reese Lima MD

## 2021-01-19 NOTE — PROGRESS NOTES
Review of Systems   Constitutional: Negative for unexpected weight change. Eyes: Negative for visual disturbance. Respiratory: Positive for cough (smoker/ chronic) and shortness of breath (smoker/ chronic). Cardiovascular: Negative for chest pain. Gastrointestinal: Negative for abdominal pain. Musculoskeletal: Positive for arthralgias (chronic) and myalgias (chronic). Neurological: Positive for headaches (from new medication). Hematological: Positive for adenopathy. Psychiatric/Behavioral: Negative for dysphoric mood. The patient is nervous/anxious (Extra nervous).

## 2021-01-28 ENCOUNTER — OFFICE VISIT (OUTPATIENT)
Dept: SURGERY | Age: 71
End: 2021-01-28

## 2021-01-28 VITALS
HEIGHT: 63 IN | HEART RATE: 91 BPM | RESPIRATION RATE: 17 BRPM | BODY MASS INDEX: 24.8 KG/M2 | SYSTOLIC BLOOD PRESSURE: 123 MMHG | WEIGHT: 140 LBS | OXYGEN SATURATION: 90 % | DIASTOLIC BLOOD PRESSURE: 80 MMHG | TEMPERATURE: 97.5 F

## 2021-01-28 DIAGNOSIS — Z17.0 MALIGNANT NEOPLASM OF UPPER-INNER QUADRANT OF RIGHT BREAST IN FEMALE, ESTROGEN RECEPTOR POSITIVE (HCC): Primary | ICD-10-CM

## 2021-01-28 DIAGNOSIS — C50.211 MALIGNANT NEOPLASM OF UPPER-INNER QUADRANT OF RIGHT BREAST IN FEMALE, ESTROGEN RECEPTOR POSITIVE (HCC): Primary | ICD-10-CM

## 2021-01-28 PROCEDURE — 99024 POSTOP FOLLOW-UP VISIT: CPT | Performed by: SURGERY

## 2021-01-28 ASSESSMENT — ENCOUNTER SYMPTOMS
SHORTNESS OF BREATH: 1
COUGH: 1
ABDOMINAL PAIN: 0

## 2021-01-28 NOTE — PROGRESS NOTES
01/28/21     CHIEF COMPLAINT:  Post-operative lymphocele     HISTORY OF PRESENT ILLNESS:  Kristen Sarkar is a 78 y.o. woman who is status-post right partial mastectomy and right sentinel lymph node biopsy on 1/5/2021 for a right breast cancer. She has developed a right axillary lymphocele which I aspirated 20 ml in the office on 1/15/2021 and 30 ml on 1/18/2021. She presents today for a recheck. She states that the area is much small and not causing her much discomfort. She denies any redness around or drainage from the incisions. Previously the medial aspect of the breast incision was noted to be opening slightly and she states that is healing nicely now. She reports developing tremors, dizziness, and dry mouth over the last few days. She also reports a severe headache for a few days and diarrhea. She states that she saw her primary care physician and had blood work drawn and is awaiting the results. She was told to stop her Bactrim which she did. She has not noticed a difference since stopping this. She denies fevers. She does report shortness of breast that is chronic. My MA escorted her into the exam room because of her intermittent dizziness. PHYSICAL EXAMINATION:     BREAST EXAMINATION: On examination, the axillary incision is clean, dry, and intact and healing well. There is no sign of any infection or drainage from the wound. There is a small lymphocele in the right axilla. The medial aspect of the breast incision is healing. There is no surrounding erythema or drainage.     IMPRESSION/RECOMMENDATION:    Cancer Staging  Malignant neoplasm of upper-inner quadrant of right breast in female, estrogen receptor positive ([facility] [address]  Staging form: Breast, AJCC 8th Edition  - Clinical: Stage IA (cT1b, cN0, cM0, G2, ER+, VA+, HER2-) - Signed by Lteha Palumbo MD on 12/11/2020  - Pathologic stage from 1/15/2021: Stage IA (pT1b, pN0(sn), cM0, G1, ER+, VA+, HER2-) - Signed by Letha Palumbo

## 2021-01-30 ENCOUNTER — HOSPITAL ENCOUNTER (EMERGENCY)
Age: 71
Discharge: ANOTHER HOSPITAL | End: 2021-01-30
Attending: EMERGENCY MEDICINE
Payer: MEDICARE

## 2021-01-30 ENCOUNTER — APPOINTMENT (OUTPATIENT)
Dept: CT IMAGING | Age: 71
End: 2021-01-30
Payer: MEDICARE

## 2021-01-30 ENCOUNTER — HOSPITAL ENCOUNTER (OUTPATIENT)
Age: 71
Setting detail: OBSERVATION
Discharge: HOME | End: 2021-02-01
Attending: INTERNAL MEDICINE | Admitting: INTERNAL MEDICINE
Payer: MEDICARE

## 2021-01-30 ENCOUNTER — APPOINTMENT (OUTPATIENT)
Dept: GENERAL RADIOLOGY | Age: 71
End: 2021-01-30
Payer: MEDICARE

## 2021-01-30 VITALS
SYSTOLIC BLOOD PRESSURE: 125 MMHG | DIASTOLIC BLOOD PRESSURE: 85 MMHG | BODY MASS INDEX: 23.9 KG/M2 | OXYGEN SATURATION: 100 % | TEMPERATURE: 98 F | HEART RATE: 74 BPM | HEIGHT: 64 IN | WEIGHT: 140 LBS | RESPIRATION RATE: 27 BRPM

## 2021-01-30 DIAGNOSIS — E83.42 HYPOMAGNESEMIA: ICD-10-CM

## 2021-01-30 DIAGNOSIS — E87.6 HYPOKALEMIA: ICD-10-CM

## 2021-01-30 DIAGNOSIS — G45.9 TIA (TRANSIENT ISCHEMIC ATTACK): Primary | ICD-10-CM

## 2021-01-30 LAB
ANION GAP SERPL CALCULATED.3IONS-SCNC: 10 MMOL/L (ref 3–16)
BASOPHILS ABSOLUTE: 0 K/UL (ref 0–0.2)
BASOPHILS RELATIVE PERCENT: 0.5 %
BUN BLDV-MCNC: 16 MG/DL (ref 7–20)
CALCIUM SERPL-MCNC: 10.1 MG/DL (ref 8.3–10.6)
CHLORIDE BLD-SCNC: 101 MMOL/L (ref 99–110)
CHP ED QC CHECK: NORMAL
CO2: 27 MMOL/L (ref 21–32)
CREAT SERPL-MCNC: 0.8 MG/DL (ref 0.6–1.2)
EKG ATRIAL RATE: 75 BPM
EKG DIAGNOSIS: NORMAL
EKG P AXIS: 48 DEGREES
EKG P-R INTERVAL: 150 MS
EKG Q-T INTERVAL: 398 MS
EKG QRS DURATION: 94 MS
EKG QTC CALCULATION (BAZETT): 444 MS
EKG R AXIS: 5 DEGREES
EKG T AXIS: 65 DEGREES
EKG VENTRICULAR RATE: 75 BPM
EOSINOPHILS ABSOLUTE: 0.1 K/UL (ref 0–0.6)
EOSINOPHILS RELATIVE PERCENT: 1 %
GFR AFRICAN AMERICAN: >60
GFR NON-AFRICAN AMERICAN: >60
GLUCOSE BLD-MCNC: 116 MG/DL
GLUCOSE BLD-MCNC: 116 MG/DL (ref 70–99)
HCT VFR BLD CALC: 39.3 % (ref 36–48)
HEMOGLOBIN: 13.4 G/DL (ref 12–16)
LYMPHOCYTES ABSOLUTE: 2.1 K/UL (ref 1–5.1)
LYMPHOCYTES RELATIVE PERCENT: 31.6 %
MAGNESIUM: 1.7 MG/DL (ref 1.8–2.4)
MAGNESIUM: 2.1 MG/DL (ref 1.8–2.4)
MCH RBC QN AUTO: 31.4 PG (ref 26–34)
MCHC RBC AUTO-ENTMCNC: 34.1 G/DL (ref 31–36)
MCV RBC AUTO: 92.2 FL (ref 80–100)
MONOCYTES ABSOLUTE: 0.6 K/UL (ref 0–1.3)
MONOCYTES RELATIVE PERCENT: 8.4 %
NEUTROPHILS ABSOLUTE: 3.8 K/UL (ref 1.7–7.7)
NEUTROPHILS RELATIVE PERCENT: 58.5 %
PDW BLD-RTO: 13.3 % (ref 12.4–15.4)
PLATELET # BLD: 210 K/UL (ref 135–450)
PMV BLD AUTO: 8.3 FL (ref 5–10.5)
POTASSIUM REFLEX MAGNESIUM: 3.3 MMOL/L (ref 3.5–5.1)
POTASSIUM SERPL-SCNC: 4.1 MMOL/L (ref 3.5–5.1)
RBC # BLD: 4.26 M/UL (ref 4–5.2)
SODIUM BLD-SCNC: 138 MMOL/L (ref 136–145)
TROPONIN: <0.01 NG/ML
TROPONIN: <0.01 NG/ML
WBC # BLD: 6.6 K/UL (ref 4–11)

## 2021-01-30 PROCEDURE — 83735 ASSAY OF MAGNESIUM: CPT

## 2021-01-30 PROCEDURE — 6360000002 HC RX W HCPCS: Performed by: EMERGENCY MEDICINE

## 2021-01-30 PROCEDURE — G0378 HOSPITAL OBSERVATION PER HR: HCPCS

## 2021-01-30 PROCEDURE — 99285 EMERGENCY DEPT VISIT HI MDM: CPT

## 2021-01-30 PROCEDURE — 71045 X-RAY EXAM CHEST 1 VIEW: CPT

## 2021-01-30 PROCEDURE — 85025 COMPLETE CBC W/AUTO DIFF WBC: CPT

## 2021-01-30 PROCEDURE — G0379 DIRECT REFER HOSPITAL OBSERV: HCPCS

## 2021-01-30 PROCEDURE — 6360000004 HC RX CONTRAST MEDICATION: Performed by: EMERGENCY MEDICINE

## 2021-01-30 PROCEDURE — 1200000000 HC SEMI PRIVATE

## 2021-01-30 PROCEDURE — 84132 ASSAY OF SERUM POTASSIUM: CPT

## 2021-01-30 PROCEDURE — 70450 CT HEAD/BRAIN W/O DYE: CPT

## 2021-01-30 PROCEDURE — 6370000000 HC RX 637 (ALT 250 FOR IP): Performed by: INTERNAL MEDICINE

## 2021-01-30 PROCEDURE — 96372 THER/PROPH/DIAG INJ SC/IM: CPT

## 2021-01-30 PROCEDURE — 93010 ELECTROCARDIOGRAM REPORT: CPT | Performed by: INTERNAL MEDICINE

## 2021-01-30 PROCEDURE — 80048 BASIC METABOLIC PNL TOTAL CA: CPT

## 2021-01-30 PROCEDURE — 96365 THER/PROPH/DIAG IV INF INIT: CPT

## 2021-01-30 PROCEDURE — 36415 COLL VENOUS BLD VENIPUNCTURE: CPT

## 2021-01-30 PROCEDURE — 96366 THER/PROPH/DIAG IV INF ADDON: CPT

## 2021-01-30 PROCEDURE — 93005 ELECTROCARDIOGRAM TRACING: CPT | Performed by: EMERGENCY MEDICINE

## 2021-01-30 PROCEDURE — 6360000002 HC RX W HCPCS: Performed by: INTERNAL MEDICINE

## 2021-01-30 PROCEDURE — 70498 CT ANGIOGRAPHY NECK: CPT

## 2021-01-30 PROCEDURE — 2580000003 HC RX 258: Performed by: INTERNAL MEDICINE

## 2021-01-30 PROCEDURE — 84484 ASSAY OF TROPONIN QUANT: CPT

## 2021-01-30 PROCEDURE — 6370000000 HC RX 637 (ALT 250 FOR IP): Performed by: EMERGENCY MEDICINE

## 2021-01-30 RX ORDER — POLYETHYLENE GLYCOL 3350 17 G/17G
17 POWDER, FOR SOLUTION ORAL DAILY PRN
Status: DISCONTINUED | OUTPATIENT
Start: 2021-01-30 | End: 2021-02-01 | Stop reason: HOSPADM

## 2021-01-30 RX ORDER — ATORVASTATIN CALCIUM 40 MG/1
40 TABLET, FILM COATED ORAL DAILY
Refills: 0 | Status: DISCONTINUED | OUTPATIENT
Start: 2021-01-30 | End: 2021-02-01 | Stop reason: HOSPADM

## 2021-01-30 RX ORDER — PANTOPRAZOLE SODIUM 40 MG/1
40 TABLET, DELAYED RELEASE ORAL
Status: DISCONTINUED | OUTPATIENT
Start: 2021-01-31 | End: 2021-02-01 | Stop reason: HOSPADM

## 2021-01-30 RX ORDER — ASPIRIN 81 MG/1
81 TABLET ORAL DAILY
Status: DISCONTINUED | OUTPATIENT
Start: 2021-01-30 | End: 2021-02-01 | Stop reason: HOSPADM

## 2021-01-30 RX ORDER — ONDANSETRON 2 MG/ML
4 INJECTION INTRAMUSCULAR; INTRAVENOUS EVERY 6 HOURS PRN
Status: DISCONTINUED | OUTPATIENT
Start: 2021-01-30 | End: 2021-02-01 | Stop reason: HOSPADM

## 2021-01-30 RX ORDER — POTASSIUM CHLORIDE 20 MEQ/1
40 TABLET, EXTENDED RELEASE ORAL ONCE
Status: COMPLETED | OUTPATIENT
Start: 2021-01-30 | End: 2021-01-30

## 2021-01-30 RX ORDER — VITAMIN E 268 MG
400 CAPSULE ORAL 2 TIMES DAILY
COMMUNITY

## 2021-01-30 RX ORDER — PROMETHAZINE HYDROCHLORIDE 25 MG/1
12.5 TABLET ORAL EVERY 6 HOURS PRN
Status: DISCONTINUED | OUTPATIENT
Start: 2021-01-30 | End: 2021-02-01 | Stop reason: HOSPADM

## 2021-01-30 RX ORDER — ASPIRIN 300 MG/1
300 SUPPOSITORY RECTAL DAILY
Status: DISCONTINUED | OUTPATIENT
Start: 2021-01-30 | End: 2021-02-01 | Stop reason: HOSPADM

## 2021-01-30 RX ORDER — SODIUM CHLORIDE 0.9 % (FLUSH) 0.9 %
10 SYRINGE (ML) INJECTION EVERY 12 HOURS SCHEDULED
Status: DISCONTINUED | OUTPATIENT
Start: 2021-01-30 | End: 2021-02-01 | Stop reason: HOSPADM

## 2021-01-30 RX ORDER — MAGNESIUM SULFATE IN WATER 40 MG/ML
2000 INJECTION, SOLUTION INTRAVENOUS ONCE
Status: COMPLETED | OUTPATIENT
Start: 2021-01-30 | End: 2021-01-30

## 2021-01-30 RX ORDER — SODIUM CHLORIDE 0.9 % (FLUSH) 0.9 %
10 SYRINGE (ML) INJECTION PRN
Status: DISCONTINUED | OUTPATIENT
Start: 2021-01-30 | End: 2021-02-01 | Stop reason: HOSPADM

## 2021-01-30 RX ORDER — ANASTROZOLE 1 MG/1
1 TABLET ORAL DAILY
Status: DISCONTINUED | OUTPATIENT
Start: 2021-01-30 | End: 2021-02-01 | Stop reason: HOSPADM

## 2021-01-30 RX ADMIN — ATORVASTATIN CALCIUM 40 MG: 40 TABLET, FILM COATED ORAL at 21:09

## 2021-01-30 RX ADMIN — Medication 10 ML: at 21:10

## 2021-01-30 RX ADMIN — MAGNESIUM SULFATE HEPTAHYDRATE 2000 MG: 40 INJECTION, SOLUTION INTRAVENOUS at 12:42

## 2021-01-30 RX ADMIN — ASPIRIN 81 MG: 81 TABLET, COATED ORAL at 21:09

## 2021-01-30 RX ADMIN — ENOXAPARIN SODIUM 40 MG: 40 INJECTION SUBCUTANEOUS at 21:09

## 2021-01-30 RX ADMIN — IOPAMIDOL 85 ML: 755 INJECTION, SOLUTION INTRAVENOUS at 11:09

## 2021-01-30 RX ADMIN — POTASSIUM CHLORIDE 40 MEQ: 1500 TABLET, EXTENDED RELEASE ORAL at 12:43

## 2021-01-30 ASSESSMENT — ENCOUNTER SYMPTOMS
SORE THROAT: 0
VOMITING: 0
EYE DISCHARGE: 0
BACK PAIN: 0
NAUSEA: 0
ABDOMINAL PAIN: 0
COUGH: 0
DIARRHEA: 1

## 2021-01-30 ASSESSMENT — PAIN DESCRIPTION - DESCRIPTORS: DESCRIPTORS: ACHING

## 2021-01-30 ASSESSMENT — PAIN SCALES - GENERAL: PAINLEVEL_OUTOF10: 4

## 2021-01-30 ASSESSMENT — PAIN DESCRIPTION - LOCATION: LOCATION: HEAD

## 2021-01-30 NOTE — H&P
Hospital Medicine History & Physical      PCP: John Barfield MD    Date of Admission: 1/30/2021    Date of Service: Pt seen/examined on 1/30/2021 and Admitted to Inpatient with expected LOS greater than two midnights due to medical therapy. .    Chief Complaint: Patient was transferred from [facility] ER for further work-up for possible TIA ,-unsteady gait, dizziness and overall weakness      History Of Present Illness:  (    81 y.o. female who presented to [facility] with above complaint. She has a history of breast cancer and had lumpectomy in the recent past.  She was prescribed 2 different antibiotics for possible infection in the incision site. She could not tolerate the antibiotics and she started with diarrhea and not feeling well. She stopped her antibiotics. She could not get up from the bed because of generalized weakness for couple of days. She feels dizzy and unsteady gait whenever she moves around. This reason she came into the emergency room. She did have headache few days ago. No headache now. Still feels very weak and unsteady gait. No dizziness at this time. No syncope confusion fever cough sputum change in mental status or diarrhea or urinary complaints. No focal neurological symptoms.     Past Medical History:          Diagnosis Date    Allergic sinusitis     ANIMAL ALLERGIES    Back pain     Hyperlipidemia     Malignant neoplasm of upper-inner quadrant of right breast in female, estrogen receptor positive ([facility] [address] 12/11/2020    Reflux        Past Surgical History:          Procedure Laterality Date    ANKLE SURGERY Left     ARTHRITIS, BONE SPUR    CHOLECYSTECTOMY      COLONOSCOPY  2005    normal    COLONOSCOPY  03/15/2017    diverticulosis/sigmoid polyp    CYSTOSCOPY Right 09/2020    CYST ON KIDNEY    HYSTERECTOMY      LUMBAR SPINE SURGERY N/A 10/9/2020 MICROLUMBAR DISCECTOMY, MICROLUMBAR LAMINECTOMY L4-5, L5-S1  CPT CODE - 15873 performed by Corbin Muñoz MD at [address] Right 1/5/2021    RIGHT NEEDLE LOCALIZED PARTIAL MASTECTOMY, RIGHT SENTINEL LYMPH NODE BIOPSY  CPT CODE - 83739 performed by Myah Hernandez MD at [facility] [address]  05/22/2013    cystoscopy urethral dilatation    PAIN MANAGEMENT PROCEDURE Left 7/14/2020    LEFT LUMBAR TWO THREE EPIDURAL STEROID INJECTION SITE CONFIRMED BY FLUOROSCOPY performed by Daisy Jean Baptiste MD at [address] Left 8/24/2020    LEFT LUMBAR FOUR FIVE EPIDURAL STEROID INJECTION SITE CONFIRMED BY FLUOROSCOPY performed by Daisy Jean Baptiste MD [facility] [address] [facility] RIGHT  12/2/2020    US BREAST NEEDLE BIOPSY RIGHT 12/2/2020 Harry Arana MD [facility]       Medications Prior to Admission:      Prior to Admission medications    Medication Sig Start Date End Date Taking? Authorizing Provider   vitamin E 400 UNIT capsule Take 400 Units by mouth 2 times daily    Historical Provider, MD   anastrozole (ARIMIDEX) 1 MG tablet Take 1 mg by mouth daily    Historical Provider, MD   acetaminophen (TYLENOL) 500 MG tablet Take 500 mg by mouth every 6 hours as needed for Pain    Historical Provider, MD   Ibuprofen (ADVIL PO) Take by mouth    Historical Provider, MD   omeprazole (PRILOSEC) 20 MG capsule TAKE ONE CAPSULE BY MOUTH ONCE DAILY. 4/22/13   GER Stewart CNP   simvastatin (ZOCOR) 40 MG tablet TAKE ONE TABLET BY MOUTH EVERY NIGHT AT BEDTIME. 4/22/13   GER Stewart CNP       Allergies:  Dye [barium-containing compounds] and Pcn [penicillins]    Social History:      The patient currently lives at home    TOBACCO:   reports that she has been smoking cigarettes. She has a 56.00 pack-year smoking history.  She has never used smokeless tobacco. ETOH:   reports no history of alcohol use. E-Cigarettes/Vaping Use     Questions Responses    E-Cigarette/Vaping Use Never User    Start Date     Passive Exposure     Quit Date     Counseling Given     Comments             Family History:      Reviewed in detail and negative for DM, CAD, Cancer, CVA. Positive as follows:        Problem Relation Age of Onset    Cancer Paternal Grandmother     Cancer Maternal Grandfather        REVIEW OF SYSTEMS:   Pertinent positives as noted in the HPI. All other systems reviewed and negative. PHYSICAL EXAM PERFORMED:    /82   Pulse 83   Temp 98.4 °F (36.9 °C) (Oral)   Resp 16   Ht 5' 4\" (1.626 m)   Wt 140 lb (63.5 kg)   SpO2 94%   BMI 24.03 kg/m²     General appearance:  No apparent distress, appears stated age and cooperative. HEENT:  Normal cephalic, atraumatic without obvious deformity. Pupils equal, round, and reactive to light. Extra ocular muscles intact. Conjunctivae/corneas clear. Neck: Supple, with full range of motion. No jugular venous distention. Trachea midline. Respiratory:  Normal respiratory effort. Clear to auscultation, bilaterally without Rales/Wheezes/Rhonchi. Cardiovascular:  Regular rate and rhythm with normal S1/S2 without murmurs, rubs or gallops. Abdomen: Soft, non-tender, non-distended with normal bowel sounds. Musculoskeletal:  No clubbing, cyanosis or edema bilaterally. Full range of motion without deformity. Skin: Skin color, texture, turgor normal.  No rashes or lesions. Incision over the right breast and right armpit, healed and there is no evidence of infection. But still the incision over the armpit is tender. Neurologic:  Neurovascularly intact without any focal sensory/motor deficits.  Cranial nerves: II-XII intact, grossly non-focal.  Has mild tremors of bilateral extremities  Psychiatric:  Alert and oriented, thought content appropriate, normal insight  Capillary Refill: Brisk,< 3 seconds Peripheral Pulses: +2 palpable, equal bilaterally       Labs:     Recent Labs     01/30/21  1038   WBC 6.6   HGB 13.4   HCT 39.3        Recent Labs     01/30/21  1038      K 3.3*      CO2 27   BUN 16   CREATININE 0.8   CALCIUM 10.1     No results for input(s): AST, ALT, BILIDIR, BILITOT, ALKPHOS in the last 72 hours. No results for input(s): INR in the last 72 hours. No results for input(s): Towana Christopher in the last 72 hours. Urinalysis:    No results found for: [place] Lazar, [address], Shadi Professor Alec Canales [facility] [address], [address] [facility], [facility], [address], [facility] [address]    Radiology:     CXR: I have reviewed the CXR with the following interpretation:   Focal curvilinear density lateral lower left chest typical of subsegmental   atelectasis.       No other acute pulmonary disease evident.       Chronic appearing coarse interstitial densities predominate perihilar regions   and lung bases, typical of sequela from smoking or other previous   infectious/inflammatory process.       Calcific atherosclerotic disease aorta.        EKG:  I have reviewed the EKG with the following interpretation: Normal sinus rhythm 75/min    No orders to display     CT of the head and CTA head and neck-no acute findings  ASSESSMENT:    Active Hospital Problems    Diagnosis Date Noted    TIA (transient ischemic attack) [G45.9] 01/30/2021         PLAN:    Generalized weakness, dizziness and unsteady gait-most probably secondary to physical decline following diarrhea and recent surgery, still rule out neurogenic causes-admit, telemetry, neuro watch, aspirin, statin, MRI of the brain, echocardiogram  Given history of CVA of the breast-MRI was ordered with and without contrast  PT OT eval, consider neurology if necessary  No diarrhea at this time    History of breast cancer-s/p lumpectomy-incision almost healed but she was prescribed antibiotics and she stopped snf.-Antibiotic was prescribed by radiation oncologist Dr. Claribel Calderon-  Currently no evidence of infection Hypokalemia and hypomagnesemia-most probably secondary to poor intake and diarrhea-replace and monitor               DVT Prophylaxis: Lovenox  Diet: General  Code Status: Full code    PT/OT Eval Status: Ordered    Dispo -admitted to Satya Camarena MD    Thank you Rylee Cueva MD for the opportunity to be involved in this patient's care. If you have any questions or concerns please feel free to contact me at [phone].

## 2021-01-30 NOTE — ED PROVIDER NOTES
[address] ED  EMERGENCY DEPARTMENT ENCOUNTER        Pt Name: Ronnie Lorenzo  MRN: [id number]  [place] 1950  Date of evaluation: 1/30/2021  Provider: Yuliet Colon MD  PCP: Shreya Rhoades MD  ED Attending: Yuliet Colon MD    [facility]       Chief Complaint   Patient presents with    Tremors     pt states she was started on antibiotics after an infection started when she had lymphnode removed. pt states after starting antibiotics, she started having tremors and a headache. did stop taking the antibiotics yesterday       HISTORY OF PRESENT ILLNESS   (Location/Symptom, Timing/Onset, Context/Setting, Quality, Duration, Modifying Factors, Severity)  Note limiting factors. Ronnie Lorenzo is a 78 y.o. female who presents with headache, bilateral arm tremoring, and \"balance problems\". Patient started antibiotics secondary to post-tumor removal surgery. Infection is improving. Symptoms of tremors and headache started five days ago. Patient was switched by her surgeon to a different antibiotic. Nothing seems to make the symptoms better or worse. Sometimes she has difficulty getting words out. Is having periods when she can't focus. Patient currently a breast cancer patient. Prior to this current headache started, patient previously has a history of aura/occular migraine followed then by severe headache. Patient today has also had some \"shots of light\" through her vision similar to prior aura. History is obtained from the patient. REVIEW OF SYSTEMS    (2-9 systems for level 4, 10 or more for level 5)     Review of Systems   Constitutional: Negative for chills and fever. HENT: Negative for congestion and sore throat. Eyes: Negative for discharge. Respiratory: Negative for cough. Cardiovascular: Negative for chest pain. Gastrointestinal: Positive for diarrhea. Negative for abdominal pain, nausea and vomiting. Endocrine: Negative for polydipsia. Genitourinary: Negative for dysuria and urgency. Musculoskeletal: Negative for back pain and myalgias. Skin: Negative for rash. Neurological: Positive for dizziness, tremors, light-headedness and headaches. Negative for weakness. Hematological: Does not bruise/bleed easily. Psychiatric/Behavioral: Negative for confusion. Positives and Pertinent negatives as per HPI. Except as noted above in the ROS, all other systems were reviewed and negative.        PAST MEDICAL HISTORY     Past Medical History:   Diagnosis Date    Allergic sinusitis     ANIMAL ALLERGIES    Back pain     Hyperlipidemia     Malignant neoplasm of upper-inner quadrant of right breast in female, estrogen receptor positive ([facility] [address] 12/11/2020    Reflux          SURGICAL HISTORY     Past Surgical History:   Procedure Laterality Date    ANKLE SURGERY Left     ARTHRITIS, BONE SPUR    CHOLECYSTECTOMY      COLONOSCOPY  2005    normal    COLONOSCOPY  03/15/2017    diverticulosis/sigmoid polyp    CYSTOSCOPY Right 09/2020    CYST ON KIDNEY    HYSTERECTOMY      LUMBAR SPINE SURGERY N/A 10/9/2020    MICROLUMBAR DISCECTOMY, MICROLUMBAR LAMINECTOMY L4-5, L5-S1  CPT CODE - 88823 performed by Ivone Talbot MD at [facility]. Right 1/5/2021    RIGHT NEEDLE LOCALIZED PARTIAL MASTECTOMY, RIGHT SENTINEL LYMPH NODE BIOPSY  CPT CODE - 52556 performed by Marialuisa Tomlinson MD at [address]  05/22/2013    cystoscopy urethral dilatation    PAIN MANAGEMENT PROCEDURE Left 7/14/2020    LEFT LUMBAR TWO THREE EPIDURAL STEROID INJECTION SITE CONFIRMED BY FLUOROSCOPY performed by Sujit Del Cid MD at [facility] Left 8/24/2020    LEFT LUMBAR FOUR FIVE EPIDURAL STEROID INJECTION SITE CONFIRMED BY FLUOROSCOPY performed by Sujit Del Cid MD at [address] RIGHT  12/2/2020 US BREAST NEEDLE BIOPSY RIGHT 12/2/2020 Salbador Jack MD [address]       Discharge Medication List as of 1/30/2021  4:32 PM      CONTINUE these medications which have NOT CHANGED    Details   vitamin E 400 UNIT capsule Take 400 Units by mouth 2 times dailyHistorical Med      anastrozole (ARIMIDEX) 1 MG tablet Take 1 mg by mouth dailyHistorical Med      acetaminophen (TYLENOL) 500 MG tablet Take 500 mg by mouth every 6 hours as needed for PainHistorical Med      Ibuprofen (ADVIL PO) Take by mouthHistorical Med      omeprazole (PRILOSEC) 20 MG capsule TAKE ONE CAPSULE BY MOUTH ONCE DAILY. , Disp-30 capsule, R-0Normal      simvastatin (ZOCOR) 40 MG tablet TAKE ONE TABLET BY MOUTH EVERY NIGHT AT BEDTIME., Disp-30 tablet, R-0Normal               ALLERGIES     Dye [barium-containing compounds] and Pcn [penicillins]    FAMILYHISTORY       Family History   Problem Relation Age of Onset    Cancer Paternal Grandmother     Cancer Maternal Grandfather           SOCIAL HISTORY       Social History     Socioeconomic History    Marital status:      Spouse name: None    Number of children: None    Years of education: None    Highest education level: None   Occupational History    None   Social Needs    Financial resource strain: None    Food insecurity     Worry: None     Inability: None    Transportation needs     Medical: None     Non-medical: None   Tobacco Use    Smoking status: Current Every Day Smoker     Packs/day: 1.00     Years: 56.00     Pack years: 56.00     Types: Cigarettes    Smokeless tobacco: Never Used    Tobacco comment: States that she i trying to quit/ cutting back- down to .50 ppd   Substance and Sexual Activity    Alcohol use: No    Drug use: No    Sexual activity: None   Lifestyle    Physical activity     Days per week: None     Minutes per session: None    Stress: None   Relationships    Social connections     Talks on phone: None Gets together: None     Attends [employer] service: None     Active member of club or organization: None     Attends meetings of clubs or organizations: None     Relationship status: None    Intimate partner violence     Fear of current or ex partner: None     Emotionally abused: None     Physically abused: None     Forced sexual activity: None   Other Topics Concern    None   Social History Narrative    None       SCREENINGS   NIH Stroke Scale  NIH Stroke Scale Assessed: Yes  Interval: Other (Comment)  Level of Consciousness (1a. ): Alert  LOC Questions (1b. ): Answers both correctly  LOC Commands (1c. ): Performs both tasks correctly  Best Gaze (2. ): Normal  Visual (3. ): No visual loss  Facial Palsy (4. ): Normal symmetrical movement  Motor Arm, Left (5a. ): No drift  Motor Arm, Right (5b. ): No drift  Motor Leg, Left (6a. ): No drift  Motor Leg, Right (6b. ): No drift  Limb Ataxia (7. ): Absent  Sensory (8. ): Normal  Best Language (9. ): No aphasia  Dysarthria (10. ): Normal  Extinction and Inattention (11): No abnormality  Total: 0Glasgow Coma Scale  Eye Opening: Spontaneous  Best Verbal Response: Oriented  Best Motor Response: Obeys commands  Abie Coma Scale Score: 15        PHYSICAL EXAM    (up to 7 for level 4, 8 or more for level 5)     ED Triage Vitals [01/30/21 1023]   BP Temp Temp Source Pulse Resp SpO2 Height Weight   (!) 127/91 98 °F (36.7 °C) Oral 81 18 100 % 5' 4\" (1.626 m) 140 lb (63.5 kg)       Physical Exam  Constitutional:       General: She is not in acute distress. Appearance: Normal appearance. She is well-developed and normal weight. She is not ill-appearing or toxic-appearing. HENT:      Head: Normocephalic and atraumatic. Right Ear: External ear normal.      Left Ear: External ear normal.      Nose: Nose normal.      Mouth/Throat:      Pharynx: No oropharyngeal exudate. Eyes:      General: No visual field deficit.      Conjunctiva/sclera: Conjunctivae normal. Pupils: Pupils are equal, round, and reactive to light. Neck:      Musculoskeletal: Normal range of motion and neck supple. Cardiovascular:      Rate and Rhythm: Normal rate and regular rhythm. Heart sounds: Normal heart sounds. No murmur. No friction rub. No gallop. Pulmonary:      Effort: Pulmonary effort is normal. No respiratory distress. Breath sounds: Normal breath sounds. No wheezing. Abdominal:      General: Bowel sounds are normal. There is no distension. Palpations: Abdomen is soft. Tenderness: There is no abdominal tenderness. There is no guarding or rebound. Musculoskeletal: Normal range of motion. General: No tenderness. Lymphadenopathy:      Cervical: No cervical adenopathy. Skin:     General: Skin is warm and dry. Findings: No rash. Neurological:      Mental Status: She is alert and oriented to person, place, and time. GCS: GCS eye subscore is 4. GCS verbal subscore is 5. GCS motor subscore is 6. Cranial Nerves: Cranial nerves are intact. No cranial nerve deficit, dysarthria or facial asymmetry. Sensory: Sensation is intact. No sensory deficit. Motor: Tremor present. Coordination: Coordination is intact. Finger-Nose-Finger Test and Heel to [facility] Test normal.      Comments: See NIHSS.          DIAGNOSTIC RESULTS   LABS:    Results for orders placed or performed during the hospital encounter of 01/30/21   CBC auto differential   Result Value Ref Range    WBC 6.6 4.0 - 11.0 K/uL    RBC 4.26 4.00 - 5.20 M/uL    Hemoglobin 13.4 12.0 - 16.0 g/dL    Hematocrit 39.3 36.0 - 48.0 %    MCV 92.2 80.0 - 100.0 fL    MCH 31.4 26.0 - 34.0 pg    MCHC 34.1 31.0 - 36.0 g/dL    RDW 13.3 12.4 - 15.4 %    Platelets 170 174 - 046 K/uL    MPV 8.3 5.0 - 10.5 fL    Neutrophils % 58.5 %    Lymphocytes % 31.6 %    Monocytes % 8.4 %    Eosinophils % 1.0 %    Basophils % 0.5 %    Neutrophils Absolute 3.8 1.7 - 7.7 K/uL Lymphocytes Absolute 2.1 1.0 - 5.1 K/uL    Monocytes Absolute 0.6 0.0 - 1.3 K/uL    Eosinophils Absolute 0.1 0.0 - 0.6 K/uL    Basophils Absolute 0.0 0.0 - 0.2 K/uL   Basic Metabolic Panel w/ Reflex to MG   Result Value Ref Range    Sodium 138 136 - 145 mmol/L    Potassium reflex Magnesium 3.3 (L) 3.5 - 5.1 mmol/L    Chloride 101 99 - 110 mmol/L    CO2 27 21 - 32 mmol/L    Anion Gap 10 3 - 16    Glucose 116 (H) 70 - 99 mg/dL    BUN 16 7 - 20 mg/dL    CREATININE 0.8 0.6 - 1.2 mg/dL    GFR Non-African American >60 >60    GFR African American >60 >60    Calcium 10.1 8.3 - 10.6 mg/dL   Magnesium   Result Value Ref Range    Magnesium 1.70 (L) 1.80 - 2.40 mg/dL   POCT Glucose   Result Value Ref Range    Glucose 116 mg/dL    QC OK? y    EKG 12 Lead   Result Value Ref Range    Ventricular Rate 75 BPM    Atrial Rate 75 BPM    P-R Interval 150 ms    QRS Duration 94 ms    Q-T Interval 398 ms    QTc Calculation (Bazett) 444 ms    P Axis 48 degrees    R Axis 5 degrees    T Axis 65 degrees    Diagnosis       Normal sinus rhythmNormal ECGNo previous ECGs availableConfirmed by Leticia Escobar MD, Alexandra Veliz ([id number]) on 1/30/2021 12:26:15 PM       All other labs were within normal range ornot returned as of this dictation. EKG: All EKG's are interpreted by the Emergency Department Physician who either signs or Co-signs this chart in the absence of a cardiologist.  Please see their note for interpretation of EKG.     EKG Interpretation    Interpreted by emergency department physician    Rhythm: normal sinus   Rate: normal  Axis: normal  Ectopy: none  Conduction: normal  ST Segments: normal  T Waves: normal  Q Waves: none    Clinical Impression: normal sinus rhythm      RADIOLOGY:   Non-plain film images such as CT, Ultrasound and MRI are read by the radiologist.  Plain radiographic images are visualized and preliminarily interpreted by the ED Provider with the belowfindings:

## 2021-01-30 NOTE — ED NOTES
1216-Call placed to [facility] for [facility]  01/30/21 1220    1225 - Dr. Sonam Warren called back via [facility]  01/30/21 1227    1535 - AC called with bed assignment 543-1 C-5, report # [phone]    ALS/Medic   + monitor  + Hep Lock  - Oxygen    1540 - AC called with transport ([facility] 45 min ETA)     Edith Drake  01/30/21 1541    161 - [facility] arrived to transport pt.       Edith Drake  01/30/21 1618

## 2021-01-30 NOTE — PROGRESS NOTES
Patient admitted to room 543 from [facility]. Patient oriented to room, call light, bed rails, phone, lights and bathroom. Patient instructed about the schedule of the day including: vital sign frequency, lab draws, possible tests, frequency of MD and staff rounds, including RN/MD rounding together at bedside, daily weights, and I &O's. Patient instructed about prescribed diet, how to use 8MENU, and television. Bed alarm in place, patient aware of placement and reason. Bed locked, in lowest position, side rails up 2/4, call light within reach. Will continue to monitor.

## 2021-01-31 ENCOUNTER — APPOINTMENT (OUTPATIENT)
Dept: MRI IMAGING | Age: 71
End: 2021-01-31
Attending: INTERNAL MEDICINE
Payer: MEDICARE

## 2021-01-31 LAB
CHOLESTEROL, TOTAL: 177 MG/DL (ref 0–199)
HCT VFR BLD CALC: 40.7 % (ref 36–48)
HDLC SERPL-MCNC: 43 MG/DL (ref 40–60)
HEMOGLOBIN: 13.9 G/DL (ref 12–16)
LDL CHOLESTEROL CALCULATED: 97 MG/DL
MCH RBC QN AUTO: 31.4 PG (ref 26–34)
MCHC RBC AUTO-ENTMCNC: 34.1 G/DL (ref 31–36)
MCV RBC AUTO: 92.3 FL (ref 80–100)
PDW BLD-RTO: 13.6 % (ref 12.4–15.4)
PLATELET # BLD: 178 K/UL (ref 135–450)
PMV BLD AUTO: 8.7 FL (ref 5–10.5)
RBC # BLD: 4.41 M/UL (ref 4–5.2)
TRIGL SERPL-MCNC: 185 MG/DL (ref 0–150)
VLDLC SERPL CALC-MCNC: 37 MG/DL
WBC # BLD: 5.3 K/UL (ref 4–11)

## 2021-01-31 PROCEDURE — 83036 HEMOGLOBIN GLYCOSYLATED A1C: CPT

## 2021-01-31 PROCEDURE — 6370000000 HC RX 637 (ALT 250 FOR IP): Performed by: INTERNAL MEDICINE

## 2021-01-31 PROCEDURE — 6370000000 HC RX 637 (ALT 250 FOR IP): Performed by: REGISTERED NURSE

## 2021-01-31 PROCEDURE — 96372 THER/PROPH/DIAG INJ SC/IM: CPT

## 2021-01-31 PROCEDURE — 80061 LIPID PANEL: CPT

## 2021-01-31 PROCEDURE — A9579 GAD-BASE MR CONTRAST NOS,1ML: HCPCS | Performed by: INTERNAL MEDICINE

## 2021-01-31 PROCEDURE — 85027 COMPLETE CBC AUTOMATED: CPT

## 2021-01-31 PROCEDURE — 1200000000 HC SEMI PRIVATE

## 2021-01-31 PROCEDURE — 6360000002 HC RX W HCPCS: Performed by: INTERNAL MEDICINE

## 2021-01-31 PROCEDURE — 70553 MRI BRAIN STEM W/O & W/DYE: CPT

## 2021-01-31 PROCEDURE — 97530 THERAPEUTIC ACTIVITIES: CPT

## 2021-01-31 PROCEDURE — G0378 HOSPITAL OBSERVATION PER HR: HCPCS

## 2021-01-31 PROCEDURE — 6360000004 HC RX CONTRAST MEDICATION: Performed by: INTERNAL MEDICINE

## 2021-01-31 PROCEDURE — 36415 COLL VENOUS BLD VENIPUNCTURE: CPT

## 2021-01-31 PROCEDURE — 97161 PT EVAL LOW COMPLEX 20 MIN: CPT

## 2021-01-31 PROCEDURE — 2580000003 HC RX 258: Performed by: INTERNAL MEDICINE

## 2021-01-31 RX ORDER — NICOTINE 21 MG/24HR
1 PATCH, TRANSDERMAL 24 HOURS TRANSDERMAL DAILY
Status: DISCONTINUED | OUTPATIENT
Start: 2021-01-31 | End: 2021-02-01 | Stop reason: HOSPADM

## 2021-01-31 RX ADMIN — NYSTATIN 500000 UNITS: 100000 SUSPENSION ORAL at 19:26

## 2021-01-31 RX ADMIN — NYSTATIN 500000 UNITS: 100000 SUSPENSION ORAL at 16:55

## 2021-01-31 RX ADMIN — NYSTATIN 500000 UNITS: 100000 SUSPENSION ORAL at 11:50

## 2021-01-31 RX ADMIN — ANASTROZOLE 1 MG: 1 TABLET ORAL at 07:56

## 2021-01-31 RX ADMIN — ATORVASTATIN CALCIUM 40 MG: 40 TABLET, FILM COATED ORAL at 07:56

## 2021-01-31 RX ADMIN — PANTOPRAZOLE SODIUM 40 MG: 40 TABLET, DELAYED RELEASE ORAL at 06:35

## 2021-01-31 RX ADMIN — Medication 10 ML: at 07:57

## 2021-01-31 RX ADMIN — ENOXAPARIN SODIUM 40 MG: 40 INJECTION SUBCUTANEOUS at 07:56

## 2021-01-31 RX ADMIN — Medication 10 ML: at 19:26

## 2021-01-31 RX ADMIN — GADOTERIDOL 12 ML: 279.3 INJECTION, SOLUTION INTRAVENOUS at 10:09

## 2021-01-31 RX ADMIN — ASPIRIN 81 MG: 81 TABLET, COATED ORAL at 07:56

## 2021-01-31 ASSESSMENT — PAIN DESCRIPTION - DESCRIPTORS: DESCRIPTORS: ACHING

## 2021-01-31 ASSESSMENT — PAIN DESCRIPTION - PAIN TYPE: TYPE: ACUTE PAIN

## 2021-01-31 ASSESSMENT — PAIN SCALES - GENERAL
PAINLEVEL_OUTOF10: 0
PAINLEVEL_OUTOF10: 4

## 2021-01-31 ASSESSMENT — PAIN DESCRIPTION - LOCATION: LOCATION: HEAD

## 2021-01-31 NOTE — PROGRESS NOTES
Occupational Therapy    Chart reviewed, per Physical Therapy & Nursing no needs identified for OT services at this time.     Carla Brown

## 2021-01-31 NOTE — PROGRESS NOTES
Physical Therapy    Facility/Department: [facility] C5 - MED SURG/ORTHO  Initial Assessment and Treatment    NAME: Gary Harden  : 1950  MRN: [id number]    Date of Service: 2021    Discharge Recommendations:  Home independently   PT Equipment Recommendations  Equipment Needed: No    Assessment   Body structures, Functions, Activity limitations: Decreased posture  Assessment: Patient is a 78year old female who was admitted to [facility] on 21 with unsteady gait, dizziness and overall weakness. Patient's MRI of her brain showed no acute intracranial abnormality and no acute infarct. Patient said that her symptoms have resolved. Patient was able to complete bed mobility, transfers and ambulate without an assistive device with independence today. She also ascended stairs with modified independence. Patient has returned to her prior independent level of function. No additional skilled acute PT needs. Patient is safe for home, when medically stable. PT signing off. Treatment Diagnosis: decreased independence with functional mobility. Specific instructions for Next Treatment: N/A PT signing off. Prognosis: Good  Decision Making: Low Complexity  PT Education: Goals; General Safety; Injury Prevention;Plan of Care;Precautions;Transfer Training;Disease Specific Education  Patient Education: Patient educated on how to identify signs of a CVA and when to contact her RN/MD (TUCKER). Patient educated on safety with transfers. Patient verbalized understanding. Barriers to Learning: none  No Skilled PT: Independent with functional mobility   REQUIRES PT FOLLOW UP: No  Activity Tolerance  Activity Tolerance: Patient Tolerated treatment well  Activity Tolerance: Vitals: Pre activity 117/72 Post activity: 125/89 91 BPM 97% room air       Patient Diagnosis(es): There were no encounter diagnoses. has a past medical history of Allergic sinusitis, Back pain, Hyperlipidemia, Malignant neoplasm of upper-inner quadrant of right breast in female, estrogen receptor positive ([address] and Reflux. has a past surgical history that includes Hysterectomy; Tubal ligation; Cholecystectomy; Ankle surgery (Left); other surgical history (05/22/2013); Colonoscopy (2005); Colonoscopy (03/15/2017); Pain management procedure (Left, 7/14/2020); Pain management procedure (Left, 8/24/2020); Cystoscopy (Right, 09/2020); Lumbar spine surgery (N/A, 10/9/2020); US BREAST BIOPSY W LOC DEVICE 1ST LESION RIGHT (12/2/2020); and Mastectomy (Right, 1/5/2021). Restrictions  Restrictions/Precautions  Restrictions/Precautions: General Precautions, Up as Tolerated, Fall Risk  Vision/Hearing  Vision: Impaired  Vision Exceptions: Wears glasses for reading  Hearing: Within functional limits     Subjective  General  Chart Reviewed: Yes  Patient assessed for rehabilitation services?: Yes  Additional Pertinent Hx: HPI per chart, \"76 y.o. female who presented to [facility] with unsteady gait, dizziness and overall weakness. She has a history of breast cancer and had lumpectomy in the recent past.  She could not tolerate the antibiotics and she started with diarrhea and not feeling well. She could not get up from the bed because of generalized weakness for couple of days. She feels dizzy and unsteady gait whenever she moves around. This reason she came into the emergency room. Still feels very weak and unsteady gait. No dizziness at this time. No focal neurological symptoms. CT of the head and CTA head and neck-no acute findings. \" MRI Brain: No acute intracranial abnormality. No acute infarct.   Response To Previous Treatment: Not applicable  Family / Caregiver Present: No  Referring Practitioner: Julisa Gandara MD  Referral Date : 01/30/21  Follows Commands: Within Functional Limits  General Comment Comments: Patient supine in bed upon entry of therapy staff. Subjective  Subjective: Patient agreed to participate. Patient said that her symptoms have resolved. Pain Screening  Patient Currently in Pain: Denies  Vital Signs  Patient Currently in Pain: Denies  Pre Treatment Pain Screening  Intervention List: Patient able to continue with treatment    Orientation     Social/Functional History  Social/Functional History  Lives With: Spouse( is retired and can assist if needed)  Type of Home: [address] [place] [address],[address]: One level, Work area in [address] [place] [address] Access: Level entry  Bathroom Shower/Tub: Tub/Shower unit  Bathroom Toilet: Standard  Bathroom Equipment: Grab bars in [address] [place]: Rolling walker  ADL Assistance: [address]: Independent  Homemaking Responsibilities: Yes  Meal Prep Responsibility: Primary  Laundry Responsibility: Primary  Cleaning Responsibility: Primary  Ambulation Assistance: Independent  Transfer Assistance: Independent  Active : Yes  Occupation: Retired  Type of occupation: , home instead  Additional Comments: No falls in the past 6 months.   Cognition   Cognition  Overall Cognitive Status: WFL    Objective          PROM RLE (degrees)  RLE PROM: WNL  AROM RLE (degrees)  RLE AROM: WNL  PROM LLE (degrees)  LLE PROM: WNL  AROM LLE (degrees)  LLE AROM : WNL  AROM RUE (degrees)  RUE AROM : WFL  AROM LUE (degrees)  LUE AROM : WFL  Strength RLE  Strength RLE: WNL  Strength LLE  Strength LLE: WNL  Strength RUE  Strength RUE: WNL  Strength LUE  Strength LUE: WNL  Tone RLE  RLE Tone: Normotonic  Tone LLE  LLE Tone: Normotonic  Motor Control  Gross Motor?: WFL  Coordination  Rapid Alternating Movements: Normal  Finger to Nose: Normal  Heel to Shin: Normal  Sensation  Overall Sensation Status: WNL  Bed mobility  Rolling to Left: Independent  Rolling to Right: Independent  Supine to Sit: Independent  Sit to Supine: Independent  Scooting: Independent Transfers  Sit to Stand: Independent  Stand to sit: Independent  Bed to Chair: Independent  Ambulation  Ambulation?: Yes  More Ambulation?: No  Ambulation 1  Assistance: Modified Independent  Quality of Gait: slightly antalgic gait pattern. decreased right foot clearance. patient stated that this is her baseline. \"That is how I have been walking for a while. \" \"I think that is just what is happening now since I am getting older. \" \"Maybe it is my left hip? \" Decreased right arm swing during gait. forward flexed posture, cueing to correct. Gait Deviations: Decreased arm swing;Decreased step length;Decreased step height  Distance: 150 feet x 2  Comments: No complaints of shortness of breath, chest pain or dizziness. No loss of balance. Stairs/Curb  Stairs?: Yes  Stairs  # Steps : 4(ascended 4, six inch steps. then descended 6, four inch steps. finally she descended a curb with no rail.)  Stairs Height: 6\"  Rails: Right ascending  Curbs: 6\"  Device: No Device  Assistance: Modified independent   Comment: No complaints of shortness of breath, chest pain or dizziness. No loss of balance. Safe reciprocal step pattern. Balance  Posture: Fair  Sitting - Static: Good  Sitting - Dynamic: Good  Standing - Static: Good  Standing - Dynamic: Good  Comments: no assistive device        Plan   Plan  Times per week: N/A PT signing off. Plan weeks: N/A PT signing off. Specific instructions for Next Treatment: N/A PT signing off. Safety Devices  Type of devices: All fall risk precautions in place, Call light within reach, Nurse notified, Gait belt, Patient at risk for falls, Left in bed    AM-PAC Score  AM-PAC Inpatient Mobility Raw Score : 24 (01/31/21 1110)  AM-PAC Inpatient T-Scale Score : 61.14 (01/31/21 1110)  Mobility Inpatient CMS 0-100% Score: 0 (01/31/21 1110)  Mobility Inpatient CMS G-Code Modifier : [address] (01/31/21 1110)          Goals  Short term goals  Time Frame for Short term goals: N/A PT signing off. Long term goals  Time Frame for Long term goals : N/A PT signing off. Patient Goals   Patient goals : No goals stated other than to return to her home.        Therapy Time   Individual Concurrent Group Co-treatment   Time In 1034         Time Out 1053         Minutes 19         Timed Code Treatment Minutes: 9 Minutes(10 minute evaluation)       Yann Napier, PT

## 2021-01-31 NOTE — PROGRESS NOTES
Pt A/O x4, VSS. No complaints of wkns or dizziness today. Pt assessment completed and charted. Will continue to monitor.

## 2021-01-31 NOTE — PROGRESS NOTES
Hospitalist Progress Note      PCP: Daniel Barclay MD    Date of Admission: 1/30/2021    Chief Complaint: Dizziness, headache, right hand numbness    Hospital Course:   78 y.o. female who presented to [facility] with above complaint. She has a history of breast cancer and had lumpectomy in the recent past.  he was prescribed 2 different antibiotics for possible infection in the incision site. She could not tolerate the antibiotics and she started with diarrhea and not feeling well. She stopped her antibiotics. She could not get up from the bed because of generalized weakness for couple of days. She feels dizzy and unsteady gait whenever she moves around. This reason she came into the emergency room. She did have headache few days ago. No headache now. Still feels very weak and unsteady gait. No dizziness at this time. No syncope confusion fever cough sputum change in mental status or diarrhea or urinary complaints. No focal neurological symptoms. Subjective: No acute issues overnight. No new complaints. Medications:  Reviewed    Infusion Medications   Scheduled Medications    nicotine  1 patch Transdermal Daily    nystatin  5 mL Oral 4x Daily    anastrozole  1 mg Oral Daily    pantoprazole  40 mg Oral QAM AC    atorvastatin  40 mg Oral Daily    sodium chloride flush  10 mL Intravenous 2 times per day    enoxaparin  40 mg Subcutaneous Daily    aspirin  81 mg Oral Daily    Or    aspirin  300 mg Rectal Daily     PRN Meds: sodium chloride flush, promethazine **OR** ondansetron, polyethylene glycol, perflutren lipid microspheres    No intake or output data in the 24 hours ending 01/31/21 1312    Physical Exam Performed:    /72   Pulse 82   Temp 97.5 °F (36.4 °C) (Oral)   Resp 14   Ht 5' 4\" (1.626 m)   Wt 140 lb (63.5 kg)   SpO2 97%   BMI 24.03 kg/m²     General appearance: No apparent distress, appears stated age and cooperative. - CT head showed no acute intracranial abnormality.   - CTA head/neck showed no flow limiting stenosis or large vessel occlusion.   - MRI brain showed no acute infarct or convincing enhancement to suggest intracranial metastatic disease.   - Continue to monitor pt on telemetry. - Continue aspirin and statin. FLP is pending.   - Obtain ECHO - pending   - Neurology consulted -- appreciate. History of breath cancer s/p lumpectomy:  - Incision almost healed. Pt was prescribed abx but she stopped [facility]. - Currently no evidence of infection. Monitor site closely. Oral thrush:  - Likely secondary to abx therapy. Oral nystatin started. Tobacco abuse:  - Cessation discussed/encouraged. Pt requesting NRT.        DVT Prophylaxis: Lovenox   Diet: DIET GENERAL; Low Sodium (2 GM)  Code Status: Full Code    PT/OT Eval Status: Ordered with recs for home independently     Dispo - Likely home  2/1 pending ECHO and Neurology evaluation    GER Kwok - CNP

## 2021-02-01 VITALS
HEART RATE: 88 BPM | HEIGHT: 64 IN | WEIGHT: 140 LBS | BODY MASS INDEX: 23.9 KG/M2 | TEMPERATURE: 97.8 F | OXYGEN SATURATION: 97 % | DIASTOLIC BLOOD PRESSURE: 78 MMHG | RESPIRATION RATE: 16 BRPM | SYSTOLIC BLOOD PRESSURE: 113 MMHG

## 2021-02-01 LAB
ESTIMATED AVERAGE GLUCOSE: 105.4 MG/DL
HBA1C MFR BLD: 5.3 %
LV EF: 58 %
LVEF MODALITY: NORMAL

## 2021-02-01 PROCEDURE — 99221 1ST HOSP IP/OBS SF/LOW 40: CPT | Performed by: PSYCHIATRY & NEUROLOGY

## 2021-02-01 PROCEDURE — 6370000000 HC RX 637 (ALT 250 FOR IP): Performed by: REGISTERED NURSE

## 2021-02-01 PROCEDURE — 96372 THER/PROPH/DIAG INJ SC/IM: CPT

## 2021-02-01 PROCEDURE — G0378 HOSPITAL OBSERVATION PER HR: HCPCS

## 2021-02-01 PROCEDURE — 2580000003 HC RX 258: Performed by: INTERNAL MEDICINE

## 2021-02-01 PROCEDURE — 93306 TTE W/DOPPLER COMPLETE: CPT

## 2021-02-01 PROCEDURE — 6360000002 HC RX W HCPCS: Performed by: INTERNAL MEDICINE

## 2021-02-01 PROCEDURE — 6370000000 HC RX 637 (ALT 250 FOR IP): Performed by: INTERNAL MEDICINE

## 2021-02-01 RX ORDER — ASPIRIN 81 MG/1
81 TABLET ORAL DAILY
Qty: 30 TABLET | Refills: 3 | Status: SHIPPED | OUTPATIENT
Start: 2021-02-02

## 2021-02-01 RX ADMIN — Medication 10 ML: at 10:10

## 2021-02-01 RX ADMIN — PANTOPRAZOLE SODIUM 40 MG: 40 TABLET, DELAYED RELEASE ORAL at 06:11

## 2021-02-01 RX ADMIN — ENOXAPARIN SODIUM 40 MG: 40 INJECTION SUBCUTANEOUS at 10:02

## 2021-02-01 RX ADMIN — ASPIRIN 81 MG: 81 TABLET, COATED ORAL at 10:01

## 2021-02-01 RX ADMIN — NYSTATIN 500000 UNITS: 100000 SUSPENSION ORAL at 10:01

## 2021-02-01 RX ADMIN — ANASTROZOLE 1 MG: 1 TABLET ORAL at 10:01

## 2021-02-01 RX ADMIN — NYSTATIN 500000 UNITS: 100000 SUSPENSION ORAL at 13:50

## 2021-02-01 RX ADMIN — ATORVASTATIN CALCIUM 40 MG: 40 TABLET, FILM COATED ORAL at 10:01

## 2021-02-01 ASSESSMENT — PAIN SCALES - GENERAL
PAINLEVEL_OUTOF10: 0

## 2021-02-01 NOTE — CONSULTS
In patient Neurology consult        [facility] Neurology      Jennifer Warren MD      Claudia Villarrealalejandro  1950    Date of Service: 2/1/2021    Referring Physician: Rose Marie Phillips MD      Reason for the consult and CC: Acute dizziness, ataxia and right-sided paresthesia. HPI:   The patient is a 78y.o.  years old female with multiple medical problems 1 history of breast cancer status post lumpectomy who was admitted to the hospital over the weekend with new onset dizziness, ataxia and right-sided paresthesia. Symptoms started 3 days ago. Description is sudden onset of dizziness with lightheadedness. Other associated symptoms including ataxia but no falling or injury, generalized weakness and right-sided paresthesia. No clear triggers. Degree was severe. Duration was persistent. No headache or chest pain. No dysphagia or dysarthria or focal weakness. No fever chills or recent change in medications. No other triggers, relieving or aggravating factors. She came into the ED at [facility] for evaluation. Initial CT head showed no acute findings. She was admitted to [facility]. The patient had MRI of the brain yesterday which was somewhat limited by motion although showed no evidence of large ischemic stroke. The patient today feels back to her baseline. Other review of system was unremarkable.       Family History   Problem Relation Age of Onset    Cancer Paternal Grandmother     Cancer Maternal Grandfather      Past Surgical History:   Procedure Laterality Date    ANKLE SURGERY Left     ARTHRITIS, BONE SPUR    CHOLECYSTECTOMY      COLONOSCOPY  2005    normal    COLONOSCOPY  03/15/2017    diverticulosis/sigmoid polyp    CYSTOSCOPY Right 09/2020    CYST ON KIDNEY    HYSTERECTOMY      LUMBAR SPINE SURGERY N/A 10/9/2020    MICROLUMBAR DISCECTOMY, MICROLUMBAR LAMINECTOMY L4-5, L5-S1  CPT CODE - 77012 performed by Corbin Muñoz MD at 2021 [facility] Right 1/5/2021 RIGHT NEEDLE LOCALIZED PARTIAL MASTECTOMY, RIGHT SENTINEL LYMPH NODE BIOPSY  CPT CODE - 81780 performed by Rosey Farley MD at [facility] [address]  05/22/2013    cystoscopy urethral dilatation    PAIN MANAGEMENT PROCEDURE Left 7/14/2020    LEFT LUMBAR TWO THREE EPIDURAL STEROID INJECTION SITE CONFIRMED BY FLUOROSCOPY performed by Eloisa Lozoya MD at [facility] Left 8/24/2020    LEFT LUMBAR FOUR FIVE EPIDURAL STEROID INJECTION SITE CONFIRMED BY FLUOROSCOPY performed by Eloisa Lozoya MD at [address] BIOPSY RIGHT  12/2/2020    US BREAST NEEDLE BIOPSY RIGHT 12/2/2020 Melanie Mosqueda MD [facility]        Past Medical History:   Diagnosis Date    Allergic sinusitis     ANIMAL ALLERGIES    Back pain     Hyperlipidemia     Malignant neoplasm of upper-inner quadrant of right breast in female, estrogen receptor positive ([facility] [address] 12/11/2020    Reflux      Social History     Tobacco Use    Smoking status: Current Every Day Smoker     Packs/day: 1.00     Years: 56.00     Pack years: 56.00     Types: Cigarettes    Smokeless tobacco: Never Used    Tobacco comment: States that she i trying to quit/ cutting back- down to .50 ppd   Substance Use Topics    Alcohol use: No    Drug use: No     Allergies   Allergen Reactions    Dye [Barium-Containing Compounds] Other (See Comments)     DISORIENTED- States that this was in her 31's     Pcn [Penicillins] Swelling     RASH     Current Facility-Administered Medications   Medication Dose Route Frequency Provider Last Rate Last Admin    nicotine (NICODERM CQ) 14 MG/24HR 1 patch  1 patch Transdermal Daily Khadijah Haider APRN - CNP   1 patch at 01/31/21 1150    nystatin (MYCOSTATIN) 043448 UNIT/ML suspension 500,000 Units  5 mL Oral 4x Daily Khadijah Haider APRN - CNP   500,000 Units at 01/31/21 1926  anastrozole (ARIMIDEX) tablet 1 mg  1 mg Oral Daily Julisa Gandara MD   1 mg at 01/31/21 0756    pantoprazole (PROTONIX) tablet 40 mg  40 mg Oral QAM AC Julisa Gandara MD   40 mg at 02/01/21 8033    atorvastatin (LIPITOR) tablet 40 mg  40 mg Oral Daily Julisa Gandara MD   40 mg at 01/31/21 0756    sodium chloride flush 0.9 % injection 10 mL  10 mL Intravenous 2 times per day Julisa Gandara MD   10 mL at 01/31/21 1926    sodium chloride flush 0.9 % injection 10 mL  10 mL Intravenous PRN Julisa Gandara MD        promethazine (PHENERGAN) tablet 12.5 mg  12.5 mg Oral Q6H PRN Julisa Gandara MD        Or    ondansetron (ZOFRAN) injection 4 mg  4 mg Intravenous Q6H PRN Julisa Gandara MD        polyethylene glycol (GLYCOLAX) packet 17 g  17 g Oral Daily PRN Julisa Gandara MD        enoxaparin (LOVENOX) injection 40 mg  40 mg Subcutaneous Daily Julisa Gandara MD   40 mg at 01/31/21 0756    aspirin EC tablet 81 mg  81 mg Oral Daily Julisa Gandara MD   81 mg at 01/31/21 8001    Or    aspirin suppository 300 mg  300 mg Rectal Daily Julisa Gandara MD        perflutren lipid microspheres (DEFINITY) injection 1.65 mg  1.5 mL Intravenous ONCE PRN Julisa Gandara MD           ROS : A 10-14 system review of constitutional, cardiovascular, respiratory, eyes, musculoskeletal, endocrine, GI, ENT, skin, hematological, genitourinary, psychiatric and neurologic systems was obtained and updated today and is unremarkable except as mentioned in my HPI      Exam:     Constitutional:   Vitals:    01/31/21 1506 01/31/21 1920 02/01/21 0001 02/01/21 0258   BP: 116/78 113/75 112/63 105/66   Pulse: 77 80 66 81   Resp: 14 14 16 14   Temp: 98.1 °F (36.7 °C) 98.2 °F (36.8 °C) 98.3 °F (36.8 °C) 97.8 °F (36.6 °C)   TempSrc: Oral Oral Oral Oral   SpO2: 97% 96% 95% 94%   Weight:       Height:           General appearance:  Normal development and appear in no acute distress. Eye: No icterus. Fundus: Funduscopic examination cannot be performed due to COVID19 restrictions and precautions. Neck: supple  Cardiovascular: No lower leg edema with good pulsation. Mental Status:   Oriented to person, place, problem, and time. Memory: Aware of recent and remote event. Good immediate recall. Intact remote memory  Normal attention span and concentration. Language: intact naming, repeating and fluency   Good fund of Knowledge. Aware of current events and vocabulary   Cranial Nerves:   II: Visual fields: Full. Pupils: equal, round, reactive to light  III,IV,VI: Extra Ocular Movements are intact. No nystagmus  V: Facial sensation is intact   VII: Facial strength and movements: intact and symmetric  VIII: Hearing: Hard of hearing  IX: Palate elevation is symmetric  XI: Shoulder shrug is intact  XII: Tongue movements are normal  Musculoskeletal: 5/5 in all 4 extremities. Tone: Normal tone. Reflexes: 2+ in the upper extremity and 1 in the lower extremity   Planters: flexor bilaterally. Coordination: no pronator drift, no dysmetria with FNF in upper extremities. Normal REM. Sensation: normal to all modalities in both arms and legs. Gait/Posture: Steady.      Data:  LABS:   Lab Results   Component Value Date     01/30/2021    K 4.1 01/30/2021    K 3.3 01/30/2021     01/30/2021    CO2 27 01/30/2021    BUN 16 01/30/2021    CREATININE 0.8 01/30/2021    GFRAA >60 01/30/2021    LABGLOM >60 01/30/2021    GLUCOSE 116 01/30/2021    MG 2.10 01/30/2021    CALCIUM 10.1 01/30/2021     Lab Results   Component Value Date    WBC 5.3 01/31/2021    RBC 4.41 01/31/2021    HGB 13.9 01/31/2021    HCT 40.7 01/31/2021    MCV 92.3 01/31/2021    RDW 13.6 01/31/2021     01/31/2021   No results found for: INR, PROTIME    Neuroimaging were independently reviewed by me and discussed results with the patient  Reviewed notes from different physicians  Reviewed lab and blood testing    Impression: Acute dizziness and ataxia seems to be improving. So far nonspecific. Could be peripheral vertigo or related to drug-induced. MRI showed no acute stroke or abnormal lesion. Less likely vascular. History of breast cancer  Smoking  Hyperlipidemia      Recommendation:  MRI results discussed with the patient  Awaiting echo results  Discussed smoking cessation and risk of strokes with the patient  Aspirin  Statin  Telemetry  PT and OT  DVT and GI prophylaxis  Blood pressure monitor at home  Recent A1c was 5.3  Follow lipid panel with PCP  Discussed risk of falling or injury and driving precautions  Can be discharged when medically stable  No further recommendation  We will sign off. Thank you for referring such patient. If you have any questions regarding my consult note, please don't hesitate to call me. Delia Elliott MD  [phone]    This dictation was generated by voice recognition computer software.  Although all attempts are made to edit the dictation for accuracy, there may be errors in the  transcription that are not intended

## 2021-02-01 NOTE — CARE COORDINATION
CM notes pt admitted with TIA. Pt has insurance and PCP. PT/OT eval and rec'd Home PRN help. Spoke to MIRIAN Samson NP and there are no DCP needs identified. Confirmed with Marcus Chang RN pt independent in room. If any needs or concerns should arise please advise.  Shawn Terry RN

## 2021-02-01 NOTE — DISCHARGE SUMMARY
Current Discharge Medication List           Details   aspirin 81 MG EC tablet Take 1 tablet by mouth daily  Qty: 30 tablet, Refills: 3      nystatin (MYCOSTATIN) 689640 UNIT/ML suspension Take 5 mLs by mouth 4 times daily  Qty: 473 mL, Refills: 0              Details   vitamin E 400 UNIT capsule Take 400 Units by mouth 2 times daily      anastrozole (ARIMIDEX) 1 MG tablet Take 1 mg by mouth daily      acetaminophen (TYLENOL) 500 MG tablet Take 500 mg by mouth every 6 hours as needed for Pain      Ibuprofen (ADVIL PO) Take by mouth      omeprazole (PRILOSEC) 20 MG capsule TAKE ONE CAPSULE BY MOUTH ONCE DAILY. Qty: 30 capsule, Refills: 0      simvastatin (ZOCOR) 40 MG tablet TAKE ONE TABLET BY MOUTH EVERY NIGHT AT BEDTIME. Qty: 30 tablet, Refills: 0             Time Spent on discharge is more than 30 minutes in the examination, evaluation, counseling and review of medications and discharge plan. Signed:    GER Arias - CNP   2/1/2021      Thank you Dequan Pond MD for the opportunity to be involved in this patient's care. If you have any questions or concerns please feel free to contact me at [phone].

## 2021-02-01 NOTE — DISCHARGE INSTR - DIET

## 2021-02-01 NOTE — PROGRESS NOTES
Speech Language Pathology  Consult Note    SLP received consult and completed chart review. RN reports pt is having no difficulty with speaking/swallowing, and formal evaluation is not indicated at this time. Please re-consult SLP if such need should arise in the future. Emily Gould, [address] [place] [phone]  Speech-Language Pathologist  (desk #): [phone]

## 2021-02-01 NOTE — PROGRESS NOTES
Patient educated on all discharge orders including medications, prescriptions, follow up appointments and general nursing care interventions. Patient's IV access removed. Patient's belongings sent with patient. Patient verbalized understanding of all discharge instructions. Patient discharged via personal vehicle in stable condition.

## 2021-06-11 ENCOUNTER — HOSPITAL ENCOUNTER (OUTPATIENT)
Dept: CT IMAGING | Age: 71
Discharge: HOME | End: 2021-06-11
Payer: MEDICARE

## 2021-06-11 DIAGNOSIS — R10.31 ABDOMINAL PAIN, RIGHT LOWER QUADRANT: ICD-10-CM

## 2021-06-11 PROCEDURE — 6360000004 HC RX CONTRAST MEDICATION: Performed by: NURSE PRACTITIONER

## 2021-06-11 PROCEDURE — 74177 CT ABD & PELVIS W/CONTRAST: CPT

## 2021-06-11 RX ADMIN — IOHEXOL 50 ML: 240 INJECTION, SOLUTION INTRATHECAL; INTRAVASCULAR; INTRAVENOUS; ORAL at 09:20

## 2021-06-11 RX ADMIN — IOPAMIDOL 75 ML: 755 INJECTION, SOLUTION INTRAVENOUS at 10:21

## 2021-07-22 ENCOUNTER — OFFICE VISIT (OUTPATIENT)
Dept: SURGERY | Age: 71
End: 2021-07-22
Payer: MEDICARE

## 2021-07-22 VITALS
SYSTOLIC BLOOD PRESSURE: 129 MMHG | OXYGEN SATURATION: 97 % | HEART RATE: 79 BPM | HEIGHT: 64 IN | TEMPERATURE: 97.8 F | DIASTOLIC BLOOD PRESSURE: 86 MMHG | RESPIRATION RATE: 17 BRPM | BODY MASS INDEX: 23.22 KG/M2 | WEIGHT: 136 LBS

## 2021-07-22 DIAGNOSIS — Z85.3 ENCOUNTER FOR FOLLOW-UP SURVEILLANCE OF BREAST CANCER: ICD-10-CM

## 2021-07-22 DIAGNOSIS — Z85.3 PERSONAL HISTORY OF BREAST CANCER: Primary | ICD-10-CM

## 2021-07-22 DIAGNOSIS — Z08 ENCOUNTER FOR FOLLOW-UP SURVEILLANCE OF BREAST CANCER: ICD-10-CM

## 2021-07-22 PROCEDURE — 99213 OFFICE O/P EST LOW 20 MIN: CPT | Performed by: SURGERY

## 2021-07-22 ASSESSMENT — ENCOUNTER SYMPTOMS
SHORTNESS OF BREATH: 0
COUGH: 0
ABDOMINAL PAIN: 0

## 2021-07-22 NOTE — PROGRESS NOTES
CHIEF COMPLAINT:  Follow-up for right breast cancer    PCP: Lisa Santiago MD  Radiation Oncology: Reza Adams MD  Medical Oncology: saw Presley Pisano MD in consultation preop, but follows only with Dr. Lolly Figueroa Treatment Summary:  Pathology: 0.6 cm right grade 1, ER+ [place]+ HER2- invasive ductal carcinoma , 0 of 1 lymph nodes with evidence of metastasis  Surgery: right partial mastectomy and sentinel lymph node biopsy on 1/5/2021  Radiation: right whole breast radiation therapy in 35 fractions  Systemic Therapy: Anastrozole initiated in May 2021    Genetic Testing:  Referred 12/15/2020    Pertinent Family History: Her paternal grandmother was diagnosed with ovarian cancer at an unknown age    HISTORY OF PRESENT ILLNESS:  Shannon Moreland is a 73 y.o. woman who is now 6 months status-post right partial mastectomy and sentinel lymph node biopsy for a right invasive ductal carcinoma. Following her surgical therapy, she was treated with adjuvant radiation therapy and adjuvant endocrine therapy with Anastrozole which she initiated in May 2021. She is tolerating endocrine therapy well in general but is experiencing hot flashes at night. She returns today for her routine follow up visit and states that she is doing well. She denies any masses in either breast.  She denies any change in the appearance of her breasts or the skin of her breasts outside of that attributed to her previous therapies. She denies bilateral nipple discharge. She denies any masses in either axilla. She denies unintentional weight loss, bone pain, headaches and has no other systemic complaints. Please note that her past medical history, surgical history, medications, allergies, social history, and family history were all reviewed with her again today. REVIEW OF SYSTEMS:   Review of Systems   Constitutional: Negative for unexpected weight change. Eyes: Negative for visual disturbance.    Respiratory: Negative for cough and shortness of breath. Cardiovascular: Negative for chest pain. Gastrointestinal: Negative for abdominal pain. Musculoskeletal: Negative for arthralgias and myalgias. Neurological: Negative for headaches. Hematological: Negative for adenopathy. Psychiatric/Behavioral: Negative for dysphoric mood. The patient is not nervous/anxious. PHYSICAL EXAMINATION:   Vitals: /86 (Site: Right Upper Arm, Position: Sitting, Cuff Size: Medium Adult)   Pulse 79   Temp 97.8 °F (36.6 °C) (Infrared)   Resp 17   Ht 5' 4\" (1.626 m)   Wt 136 lb (61.7 kg)   SpO2 97%   BMI 23.34 kg/m²   General: Well-developed, well-nourished, in no apparent distress. Psychiatric: Alert and oriented x 3. Appropriate affect and behavior for today's visit. Musculoskeletal: Normal gait and range of motion in all 4 extremities. Lymphatic System:  No concerning cervical, supraclavicular or axillary lymphadenopathy. There is no evidence of lymphedema of the right arm. Breast Exam: Bilateral breast examination reveals ptotic breasts bilaterally. Right Breast: Examination of the right breast in the upright and supine position reveals a well-healed incision with minimal volume loss and excellent symmetry. There are no masses, nodules, skin changes, or other evidence of recurrence. There is no nipple discharge or axillary adenopathy. Left Breast: Examination of the left breast in the upright and supine positions reveal no obvious masses, skin changes, dimpling, or retraction. The nipple and areola are without erosion, edema or ulceration. There is no obvious nipple discharge. There is no concerning axillary adenopathy.     IMPRESSION/RECOMMENDATION:    Cancer Staging  Malignant neoplasm of upper-inner quadrant of right breast in female, estrogen receptor positive ([facility] [address]  Staging form: Breast, AJCC 8th Edition  - Clinical: Stage IA (cT1b, cN0, cM0, G2, ER+, CT+, HER2-) - Signed by Daina Duke MD on 12/11/2020  - Pathologic stage from 1/15/2021: Stage IA (pT1b, pN0(sn), cM0, G1, ER+, CT+, HER2-) - Signed by Austyn Brunner MD on 1/15/2021    Sarika Napoles is a 73 y.o. woman who is now 6 months status-post right partial mastectomy and sentinel lymph node biopsy for a right invasive ductal carcinoma. Following her surgical therapy, she was treated with adjuvant radiation therapy and adjuvant endocrine therapy with Anastrozole which she initiated in May 2021. I reassured her that based on her clinical examination that there is no evidence of any recurrent disease or new abnormalities. We reviewed the importance of continued endocrine therapy. Signs and symptoms of recurrence were reviewed. She verbalizes understanding that she should notify our office if she identifies any abnormalities on self examination as it may require further workup. I would like to see her back in November 2021 for a clinical exam and bilateral mammograms. In the interim, I encouraged her to resume self examinations on a monthly basis and to alert me of any changes. I also encouraged her to eat healthy, stay active, and limit alcohol intake for risk reduction purposes. I answered all of her questions thoroughly, and she does seem pleased with this plan of approach. I encouraged her to contact me in the interim if any new questions or concerns arise. Approximately 20 minutes of time were spent in this visit of which 50% or more of the time was related to counseling and coordination of care.     Summary:  - f/u in November 2021 with bilateral mammograms  - continue endocrine therapy     Duglas Aguilar MD

## 2021-10-25 ENCOUNTER — HOSPITAL ENCOUNTER (OUTPATIENT)
Dept: ULTRASOUND IMAGING | Age: 71
Discharge: HOME | End: 2021-10-25
Payer: MEDICARE

## 2021-10-25 DIAGNOSIS — N28.1 ACQUIRED CYST OF KIDNEY: ICD-10-CM

## 2021-10-25 PROCEDURE — 76770 US EXAM ABDO BACK WALL COMP: CPT

## 2021-11-11 ENCOUNTER — HOSPITAL ENCOUNTER (OUTPATIENT)
Dept: WOMENS IMAGING | Age: 71
Discharge: HOME | End: 2021-11-11
Payer: MEDICARE

## 2021-11-11 ENCOUNTER — OFFICE VISIT (OUTPATIENT)
Dept: SURGERY | Age: 71
End: 2021-11-11
Payer: MEDICARE

## 2021-11-11 VITALS
HEART RATE: 68 BPM | RESPIRATION RATE: 16 BRPM | TEMPERATURE: 98 F | DIASTOLIC BLOOD PRESSURE: 90 MMHG | SYSTOLIC BLOOD PRESSURE: 140 MMHG | WEIGHT: 135 LBS | HEIGHT: 64 IN | OXYGEN SATURATION: 97 % | BODY MASS INDEX: 23.05 KG/M2

## 2021-11-11 DIAGNOSIS — N63.20 LEFT BREAST LUMP: ICD-10-CM

## 2021-11-11 DIAGNOSIS — Z08 ENCOUNTER FOR FOLLOW-UP SURVEILLANCE OF BREAST CANCER: ICD-10-CM

## 2021-11-11 DIAGNOSIS — Z85.3 ENCOUNTER FOR FOLLOW-UP SURVEILLANCE OF BREAST CANCER: ICD-10-CM

## 2021-11-11 DIAGNOSIS — Z85.3 PERSONAL HISTORY OF BREAST CANCER: Primary | ICD-10-CM

## 2021-11-11 DIAGNOSIS — Z85.3 PERSONAL HISTORY OF BREAST CANCER: ICD-10-CM

## 2021-11-11 PROCEDURE — 99213 OFFICE O/P EST LOW 20 MIN: CPT | Performed by: SURGERY

## 2021-11-11 PROCEDURE — 76642 ULTRASOUND BREAST LIMITED: CPT

## 2021-11-11 PROCEDURE — G0279 TOMOSYNTHESIS, MAMMO: HCPCS

## 2021-11-11 ASSESSMENT — ENCOUNTER SYMPTOMS
COUGH: 1
SHORTNESS OF BREATH: 0
ABDOMINAL PAIN: 0

## 2021-11-11 NOTE — PROGRESS NOTES
CHIEF COMPLAINT:  Follow-up for right breast cancer    PCP: Shawna Aguilar MD  Radiation Oncology: Matilde Flanagan MD  Medical Oncology: saw Aisha Nelson MD in consultation preop, but follows only with Dr. Aristeo Rabago Treatment Summary:  Pathology: 0.6 cm right grade 1, ER+ [place]+ HER2- invasive ductal carcinoma, 0 of 1 lymph nodes with evidence of metastasis  Surgery: right partial mastectomy and sentinel lymph node biopsy on 1/5/2021  Radiation: right whole breast radiation therapy in 35 fractions  Systemic Therapy: Anastrozole initiated in May 2021    Genetic Testing:  Referred 12/15/2020    Pertinent Family History: Her paternal grandmother was diagnosed with ovarian cancer at an unknown age    HISTORY OF PRESENT ILLNESS:  Ricco Hankins is a 73 y.o. woman who is now almost 1 year status-post right partial mastectomy and sentinel lymph node biopsy for a right invasive ductal carcinoma. Following her surgical therapy, she was treated with adjuvant radiation therapy and adjuvant endocrine therapy with Anastrozole which she initiated in May 2021. She is tolerating endocrine therapy well in general but is experiencing hot flashes. She returns today for her routine follow up visit and states that she is doing well. She denies any masses in either breast.  She denies any change in the appearance of her breasts or the skin of her breasts outside of that attributed to her previous therapies. She denies bilateral nipple discharge. She denies any masses in either axilla. She denies unintentional weight loss, bone pain, headaches and has no other systemic complaints. Please note that her past medical history, surgical history, medications, allergies, social history, and family history were all reviewed with her again today. REVIEW OF SYSTEMS:   Constitutional: Negative for unexpected weight change. Eyes: Negative for visual disturbance.    Respiratory: Positive for cough (smoker's cough). Negative for shortness of breath. Cardiovascular: Negative for chest pain. Gastrointestinal: Negative for abdominal pain. Musculoskeletal: Negative for arthralgias and myalgias. Neurological: Negative for headaches. Hematological: Negative for adenopathy. Psychiatric/Behavioral: Negative for dysphoric mood. The patient is not nervous/anxious. PHYSICAL EXAMINATION:   Vitals: BP (!) 140/90 (Site: Right Upper Arm, Position: Sitting, Cuff Size: Medium Adult)   Pulse 68   Temp 98 °F (36.7 °C) (Infrared)   Resp 16   Ht 5' 4\" (1.626 m)   Wt 135 lb (61.2 kg)   SpO2 97%   BMI 23.17 kg/m²   General: Well-developed, well-nourished, in no apparent distress. Psychiatric: Alert and oriented x 3. Appropriate affect and behavior for today's visit. Musculoskeletal: Normal gait and range of motion in all 4 extremities. Lymphatic System:  No concerning cervical, supraclavicular or axillary lymphadenopathy. There is no evidence of lymphedema of the right arm. Breast Exam: Bilateral breast examination reveals ptotic breasts bilaterally. Right Breast: Examination of the right breast in the upright and supine position reveals a well-healed incision with minimal volume loss and excellent symmetry. There are no masses, nodules, skin changes, or other evidence of recurrence. There is no nipple discharge or axillary adenopathy. Left Breast: Examination of the left breast in the upright and supine positions reveal no obvious masses, skin changes, dimpling, or retraction. The nipple and areola are without erosion, edema or ulceration. There is no obvious nipple discharge. There is no concerning axillary adenopathy.     IMPRESSION/RECOMMENDATION:    Cancer Staging  Malignant neoplasm of upper-inner quadrant of right breast in female, estrogen receptor positive ([facility] [address]  Staging form: Breast, AJCC 8th Edition  - Clinical: Stage IA (cT1b, cN0, cM0, G2, ER+, NM+, HER2-) - Signed by Kirk Simmons MD on 12/11/2020  - Pathologic stage from 1/15/2021: Stage IA (pT1b, pN0(sn), cM0, G1, ER+, [place]+, HER2-) - Signed by Shakeel Morales MD on 1/15/2021    Sydni Daily is a 73 y.o. woman who is now almost 1 year status-post right partial mastectomy and sentinel lymph node biopsy for a right invasive ductal carcinoma. Following her surgical therapy, she was treated with adjuvant radiation therapy and adjuvant endocrine therapy with Anastrozole which she initiated in May 2021. I reassured her that based on her clinical examination that there is no evidence of any recurrent disease or new abnormalities. We reviewed the importance of continued endocrine therapy. Signs and symptoms of recurrence were reviewed. She verbalizes understanding that she should notify our office if she identifies any abnormalities on self examination as it may require further workup. I would like to see her back in 1 year for a clinical exam and bilateral mammograms. In the interim, I encouraged her to resume self examinations on a monthly basis and to alert me of any changes. I also encouraged her to eat healthy, stay active, and limit alcohol intake for risk reduction purposes. I answered all of her questions thoroughly, and she does seem pleased with this plan of approach. I encouraged her to contact me in the interim if any new questions or concerns arise.     Summary:  - f/u in 1 year with bilateral mammograms  - continue endocrine therapy     Edwina Paredes MD

## 2021-12-02 ENCOUNTER — HOSPITAL ENCOUNTER (OUTPATIENT)
Age: 71
Discharge: HOME | End: 2021-12-02
Payer: MEDICARE

## 2021-12-02 ENCOUNTER — HOSPITAL ENCOUNTER (OUTPATIENT)
Dept: GENERAL RADIOLOGY | Age: 71
Discharge: HOME | End: 2021-12-02
Payer: MEDICARE

## 2021-12-02 DIAGNOSIS — M25.552 LEFT HIP PAIN: ICD-10-CM

## 2021-12-02 PROCEDURE — 73501 X-RAY EXAM HIP UNI 1 VIEW: CPT

## 2021-12-29 ENCOUNTER — HOSPITAL ENCOUNTER (OUTPATIENT)
Dept: GENERAL RADIOLOGY | Age: 71
Discharge: HOME | End: 2021-12-29
Payer: MEDICARE

## 2021-12-29 DIAGNOSIS — M81.0 AGE-RELATED OSTEOPOROSIS WITHOUT CURRENT PATHOLOGICAL FRACTURE: ICD-10-CM

## 2021-12-29 PROCEDURE — 77080 DXA BONE DENSITY AXIAL: CPT

## 2022-05-12 ENCOUNTER — HOSPITAL ENCOUNTER (OUTPATIENT)
Age: 72
Discharge: HOME | End: 2022-05-12
Payer: MEDICARE

## 2022-05-12 LAB
A/G RATIO: 2.1 (ref 1.1–2.2)
ALBUMIN SERPL-MCNC: 4.6 G/DL (ref 3.4–5)
ALP BLD-CCNC: 80 U/L (ref 40–129)
ALT SERPL-CCNC: 12 U/L (ref 10–40)
ANION GAP SERPL CALCULATED.3IONS-SCNC: 17 MMOL/L (ref 3–16)
AST SERPL-CCNC: 15 U/L (ref 15–37)
BASOPHILS ABSOLUTE: 0 K/UL (ref 0–0.2)
BASOPHILS RELATIVE PERCENT: 0.6 %
BILIRUB SERPL-MCNC: <0.2 MG/DL (ref 0–1)
BUN BLDV-MCNC: 14 MG/DL (ref 7–20)
CALCIUM SERPL-MCNC: 10.5 MG/DL (ref 8.3–10.6)
CEA: 2.9 NG/ML (ref 0–5)
CHLORIDE BLD-SCNC: 103 MMOL/L (ref 99–110)
CO2: 21 MMOL/L (ref 21–32)
CREAT SERPL-MCNC: 0.8 MG/DL (ref 0.6–1.2)
EOSINOPHILS ABSOLUTE: 0 K/UL (ref 0–0.6)
EOSINOPHILS RELATIVE PERCENT: 1 %
GFR AFRICAN AMERICAN: >60
GFR NON-AFRICAN AMERICAN: >60
GLUCOSE BLD-MCNC: 102 MG/DL (ref 70–99)
HCT VFR BLD CALC: 40.7 % (ref 36–48)
HEMOGLOBIN: 13.8 G/DL (ref 12–16)
LYMPHOCYTES ABSOLUTE: 1.8 K/UL (ref 1–5.1)
LYMPHOCYTES RELATIVE PERCENT: 37.2 %
MCH RBC QN AUTO: 31.1 PG (ref 26–34)
MCHC RBC AUTO-ENTMCNC: 34 G/DL (ref 31–36)
MCV RBC AUTO: 91.3 FL (ref 80–100)
MONOCYTES ABSOLUTE: 0.5 K/UL (ref 0–1.3)
MONOCYTES RELATIVE PERCENT: 11.6 %
NEUTROPHILS ABSOLUTE: 2.3 K/UL (ref 1.7–7.7)
NEUTROPHILS RELATIVE PERCENT: 49.6 %
PDW BLD-RTO: 13.9 % (ref 12.4–15.4)
PLATELET # BLD: 175 K/UL (ref 135–450)
PMV BLD AUTO: 9.6 FL (ref 5–10.5)
POTASSIUM SERPL-SCNC: 4 MMOL/L (ref 3.5–5.1)
RBC # BLD: 4.46 M/UL (ref 4–5.2)
SODIUM BLD-SCNC: 141 MMOL/L (ref 136–145)
TOTAL PROTEIN: 6.8 G/DL (ref 6.4–8.2)
WBC # BLD: 4.7 K/UL (ref 4–11)

## 2022-05-12 PROCEDURE — 36415 COLL VENOUS BLD VENIPUNCTURE: CPT

## 2022-05-12 PROCEDURE — 85025 COMPLETE CBC W/AUTO DIFF WBC: CPT

## 2022-05-12 PROCEDURE — 82378 CARCINOEMBRYONIC ANTIGEN: CPT

## 2022-05-12 PROCEDURE — 80053 COMPREHEN METABOLIC PANEL: CPT

## 2022-05-12 PROCEDURE — 86300 IMMUNOASSAY TUMOR CA 15-3: CPT

## 2022-05-18 LAB — CA 27-29: 21 U/ML (ref 0–38)

## 2022-10-03 ENCOUNTER — HOSPITAL ENCOUNTER (OUTPATIENT)
Age: 72
Discharge: HOME | End: 2022-10-03
Payer: MEDICARE

## 2022-10-03 LAB
A/G RATIO: 1.3 (ref 1.1–2.2)
ALBUMIN SERPL-MCNC: 4.1 G/DL (ref 3.4–5)
ALP BLD-CCNC: 74 U/L (ref 40–129)
ALT SERPL-CCNC: 11 U/L (ref 10–40)
ANION GAP SERPL CALCULATED.3IONS-SCNC: 7 MMOL/L (ref 3–16)
AST SERPL-CCNC: 15 U/L (ref 15–37)
BASOPHILS ABSOLUTE: 0 K/UL (ref 0–0.2)
BASOPHILS RELATIVE PERCENT: 0.5 %
BILIRUB SERPL-MCNC: 0.3 MG/DL (ref 0–1)
BUN BLDV-MCNC: 12 MG/DL (ref 7–20)
CALCIUM SERPL-MCNC: 10.1 MG/DL (ref 8.3–10.6)
CEA: 2.9 NG/ML (ref 0–5)
CHLORIDE BLD-SCNC: 101 MMOL/L (ref 99–110)
CO2: 27 MMOL/L (ref 21–32)
CREAT SERPL-MCNC: 0.6 MG/DL (ref 0.6–1.2)
EOSINOPHILS ABSOLUTE: 0 K/UL (ref 0–0.6)
EOSINOPHILS RELATIVE PERCENT: 0.5 %
GFR AFRICAN AMERICAN: >60
GFR NON-AFRICAN AMERICAN: >60
GLUCOSE BLD-MCNC: 95 MG/DL (ref 70–99)
HCT VFR BLD CALC: 39.4 % (ref 36–48)
HEMOGLOBIN: 13.5 G/DL (ref 12–16)
LYMPHOCYTES ABSOLUTE: 1.8 K/UL (ref 1–5.1)
LYMPHOCYTES RELATIVE PERCENT: 27.8 %
MCH RBC QN AUTO: 30.8 PG (ref 26–34)
MCHC RBC AUTO-ENTMCNC: 34.3 G/DL (ref 31–36)
MCV RBC AUTO: 89.9 FL (ref 80–100)
MONOCYTES ABSOLUTE: 0.5 K/UL (ref 0–1.3)
MONOCYTES RELATIVE PERCENT: 7.9 %
NEUTROPHILS ABSOLUTE: 4.1 K/UL (ref 1.7–7.7)
NEUTROPHILS RELATIVE PERCENT: 63.3 %
PDW BLD-RTO: 13.7 % (ref 12.4–15.4)
PLATELET # BLD: 200 K/UL (ref 135–450)
PMV BLD AUTO: 7.7 FL (ref 5–10.5)
POTASSIUM SERPL-SCNC: 4.4 MMOL/L (ref 3.5–5.1)
RBC # BLD: 4.38 M/UL (ref 4–5.2)
SODIUM BLD-SCNC: 135 MMOL/L (ref 136–145)
TOTAL PROTEIN: 7.3 G/DL (ref 6.4–8.2)
WBC # BLD: 6.5 K/UL (ref 4–11)

## 2022-10-03 PROCEDURE — 80053 COMPREHEN METABOLIC PANEL: CPT

## 2022-10-03 PROCEDURE — 36415 COLL VENOUS BLD VENIPUNCTURE: CPT

## 2022-10-03 PROCEDURE — 82378 CARCINOEMBRYONIC ANTIGEN: CPT

## 2022-10-03 PROCEDURE — 85025 COMPLETE CBC W/AUTO DIFF WBC: CPT

## 2022-10-03 PROCEDURE — 86300 IMMUNOASSAY TUMOR CA 15-3: CPT

## 2022-10-07 LAB — CA 27-29: 12 U/ML (ref 0–38)

## 2022-11-21 ENCOUNTER — HOSPITAL ENCOUNTER (OUTPATIENT)
Dept: WOMENS IMAGING | Age: 72
Discharge: HOME | End: 2022-11-21
Payer: MEDICARE

## 2022-11-21 DIAGNOSIS — Z08 ENCOUNTER FOR FOLLOW-UP SURVEILLANCE OF BREAST CANCER: ICD-10-CM

## 2022-11-21 DIAGNOSIS — Z85.3 PERSONAL HISTORY OF BREAST CANCER: ICD-10-CM

## 2022-11-21 DIAGNOSIS — N63.20 MASS OF LEFT BREAST, UNSPECIFIED QUADRANT: ICD-10-CM

## 2022-11-21 DIAGNOSIS — Z85.3 ENCOUNTER FOR FOLLOW-UP SURVEILLANCE OF BREAST CANCER: ICD-10-CM

## 2022-11-21 PROCEDURE — 77066 DX MAMMO INCL CAD BI: CPT

## 2022-11-21 PROCEDURE — 76642 ULTRASOUND BREAST LIMITED: CPT

## 2023-01-05 ENCOUNTER — HOSPITAL ENCOUNTER (OUTPATIENT)
Dept: GENERAL RADIOLOGY | Age: 73
Discharge: HOME | End: 2023-01-05
Payer: MEDICARE

## 2023-01-05 ENCOUNTER — HOSPITAL ENCOUNTER (OUTPATIENT)
Age: 73
Discharge: HOME | End: 2023-01-05
Payer: MEDICARE

## 2023-01-05 DIAGNOSIS — C50.111 MALIGNANT NEOPLASM OF CENTRAL PORTION OF RIGHT FEMALE BREAST, UNSPECIFIED ESTROGEN RECEPTOR STATUS (HCC): ICD-10-CM

## 2023-01-05 PROCEDURE — 73502 X-RAY EXAM HIP UNI 2-3 VIEWS: CPT

## 2023-07-03 ENCOUNTER — HOSPITAL ENCOUNTER (OUTPATIENT)
Dept: GENERAL RADIOLOGY | Age: 73
Discharge: HOME | End: 2023-07-03
Payer: MEDICARE

## 2023-07-03 ENCOUNTER — TELEPHONE (OUTPATIENT)
Dept: ORTHOPEDIC SURGERY | Age: 73
End: 2023-07-03

## 2023-07-03 ENCOUNTER — HOSPITAL ENCOUNTER (OUTPATIENT)
Age: 73
Discharge: HOME | End: 2023-07-03
Payer: MEDICARE

## 2023-07-03 DIAGNOSIS — G89.29 CHRONIC LEFT HIP PAIN: ICD-10-CM

## 2023-07-03 DIAGNOSIS — M25.552 CHRONIC LEFT HIP PAIN: ICD-10-CM

## 2023-07-03 PROCEDURE — 73502 X-RAY EXAM HIP UNI 2-3 VIEWS: CPT

## 2023-07-19 ENCOUNTER — OFFICE VISIT (OUTPATIENT)
Dept: ORTHOPEDIC SURGERY | Age: 73
End: 2023-07-19
Payer: MEDICARE

## 2023-07-19 VITALS — WEIGHT: 135 LBS | BODY MASS INDEX: 23.05 KG/M2 | HEIGHT: 64 IN

## 2023-07-19 DIAGNOSIS — M54.50 LUMBAR SPINE PAIN: ICD-10-CM

## 2023-07-19 DIAGNOSIS — M48.061 SPINAL STENOSIS OF LUMBAR REGION, UNSPECIFIED WHETHER NEUROGENIC CLAUDICATION PRESENT: Primary | ICD-10-CM

## 2023-07-19 PROCEDURE — 99213 OFFICE O/P EST LOW 20 MIN: CPT | Performed by: ORTHOPAEDIC SURGERY

## 2023-07-19 PROCEDURE — 1123F ACP DISCUSS/DSCN MKR DOCD: CPT | Performed by: ORTHOPAEDIC SURGERY

## 2023-07-19 NOTE — PROGRESS NOTES
FIVE EPIDURAL STEROID INJECTION SITE CONFIRMED BY FLUOROSCOPY performed by Yanni Brunner MD at [address] LOC DEVICE 1ST LESION RIGHT  12/2/2020    US BREAST NEEDLE BIOPSY RIGHT 12/2/2020 Denver Norway, MD [facility]     Current Medications:     Current Outpatient Medications:     aspirin 81 MG EC tablet, Take 1 tablet by mouth daily, Disp: 30 tablet, Rfl: 3    nystatin (MYCOSTATIN) 207215 UNIT/ML suspension, Take 5 mLs by mouth 4 times daily, Disp: 473 mL, Rfl: 0    vitamin E 400 UNIT capsule, Take 400 Units by mouth 2 times daily, Disp: , Rfl:     anastrozole (ARIMIDEX) 1 MG tablet, Take 1 mg by mouth daily, Disp: , Rfl:     acetaminophen (TYLENOL) 500 MG tablet, Take 500 mg by mouth every 6 hours as needed for Pain, Disp: , Rfl:     Ibuprofen (ADVIL PO), Take by mouth, Disp: , Rfl:     omeprazole (PRILOSEC) 20 MG capsule, TAKE ONE CAPSULE BY MOUTH ONCE DAILY. , Disp: 30 capsule, Rfl: 0    simvastatin (ZOCOR) 40 MG tablet, TAKE ONE TABLET BY MOUTH EVERY NIGHT AT BEDTIME., Disp: 30 tablet, Rfl: 0  Allergies:  Dye [barium-containing compounds] and Pcn [penicillins]  Social History:    reports that she has been smoking cigarettes. She has a 28.00 pack-year smoking history. She has never used smokeless tobacco. She reports that she does not drink alcohol and does not use drugs.   Family History:   Family History   Problem Relation Age of Onset    Cancer Paternal Grandmother     Cancer Maternal Grandfather        REVIEW OF SYSTEMS: Full ROS noted & scanned   CONSTITUTIONAL: Denies unexplained weight loss, fevers, chills or fatigue  NEUROLOGICAL: Denies unsteady gait or progressive weakness  MUSCULOSKELETAL: Denies joint swelling or redness  PSYCHOLOGICAL: Denies anxiety, depression   SKIN: Denies skin changes, delayed healing, rash, itching   HEMATOLOGIC: Denies easy bleeding or bruising  ENDOCRINE: Denies excessive

## 2023-07-21 DIAGNOSIS — M48.061 SPINAL STENOSIS OF LUMBAR REGION, UNSPECIFIED WHETHER NEUROGENIC CLAUDICATION PRESENT: ICD-10-CM

## 2023-07-21 DIAGNOSIS — M54.50 LUMBAR SPINE PAIN: Primary | ICD-10-CM

## 2023-07-31 ENCOUNTER — HOSPITAL ENCOUNTER (OUTPATIENT)
Dept: MRI IMAGING | Age: 73
Discharge: HOME | End: 2023-07-31
Attending: ORTHOPAEDIC SURGERY
Payer: MEDICARE

## 2023-07-31 DIAGNOSIS — M48.061 SPINAL STENOSIS OF LUMBAR REGION, UNSPECIFIED WHETHER NEUROGENIC CLAUDICATION PRESENT: ICD-10-CM

## 2023-07-31 PROCEDURE — A9579 GAD-BASE MR CONTRAST NOS,1ML: HCPCS | Performed by: ORTHOPAEDIC SURGERY

## 2023-07-31 PROCEDURE — 72158 MRI LUMBAR SPINE W/O & W/DYE: CPT

## 2023-07-31 PROCEDURE — 6360000004 HC RX CONTRAST MEDICATION: Performed by: ORTHOPAEDIC SURGERY

## 2023-07-31 RX ADMIN — GADOTERIDOL 15 ML: 279.3 INJECTION, SOLUTION INTRAVENOUS at 12:17

## 2023-08-03 ENCOUNTER — OFFICE VISIT (OUTPATIENT)
Dept: ORTHOPEDIC SURGERY | Age: 73
End: 2023-08-03
Payer: MEDICARE

## 2023-08-03 VITALS — BODY MASS INDEX: 23.05 KG/M2 | WEIGHT: 135 LBS | HEIGHT: 64 IN

## 2023-08-03 DIAGNOSIS — M41.50 DEGENERATIVE SCOLIOSIS: Primary | ICD-10-CM

## 2023-08-03 PROCEDURE — 1123F ACP DISCUSS/DSCN MKR DOCD: CPT | Performed by: ORTHOPAEDIC SURGERY

## 2023-08-03 PROCEDURE — 99214 OFFICE O/P EST MOD 30 MIN: CPT | Performed by: ORTHOPAEDIC SURGERY

## 2023-08-03 RX ORDER — ROSUVASTATIN CALCIUM 20 MG/1
20 TABLET, COATED ORAL
COMMUNITY

## 2023-08-03 RX ORDER — LETROZOLE 2.5 MG/1
1 TABLET, FILM COATED ORAL
COMMUNITY
Start: 2022-12-08

## 2023-09-26 ENCOUNTER — TELEPHONE (OUTPATIENT)
Dept: ORTHOPEDIC SURGERY | Age: 73
End: 2023-09-26

## 2023-09-27 ENCOUNTER — TELEPHONE (OUTPATIENT)
Dept: ORTHOPEDIC SURGERY | Age: 73
End: 2023-09-27

## 2023-09-27 NOTE — TELEPHONE ENCOUNTER
Notified Curt Cordova regarding mailing the requested medical records to the patient.     Med rec = 10/9/2020 to 9/26/2023

## 2023-12-04 ENCOUNTER — HOSPITAL ENCOUNTER (OUTPATIENT)
Dept: WOMENS IMAGING | Age: 73
Discharge: HOME | End: 2023-12-04
Payer: MEDICARE

## 2023-12-04 DIAGNOSIS — N63.20 MASS OF LEFT BREAST, UNSPECIFIED QUADRANT: ICD-10-CM

## 2023-12-04 DIAGNOSIS — N63.0 BREAST MASS IN FEMALE: ICD-10-CM

## 2023-12-04 DIAGNOSIS — Z12.31 SCREENING MAMMOGRAM FOR BREAST CANCER: ICD-10-CM

## 2023-12-04 PROCEDURE — 77066 DX MAMMO INCL CAD BI: CPT

## 2023-12-04 PROCEDURE — 76642 ULTRASOUND BREAST LIMITED: CPT

## 2023-12-12 PROBLEM — M47.816 LUMBAR FACET ARTHROPATHY: Status: ACTIVE | Noted: 2023-12-12

## 2023-12-12 PROBLEM — M48.061 SPINAL STENOSIS OF LUMBAR REGION: Status: ACTIVE | Noted: 2023-12-12

## 2023-12-12 PROBLEM — M51.369 LUMBAR DEGENERATIVE DISC DISEASE: Status: ACTIVE | Noted: 2023-12-12

## 2023-12-12 PROBLEM — M51.36 LUMBAR DEGENERATIVE DISC DISEASE: Status: ACTIVE | Noted: 2023-12-12

## 2024-07-09 ENCOUNTER — HOSPITAL ENCOUNTER (OUTPATIENT)
Dept: CT IMAGING | Age: 74
Discharge: HOME | End: 2024-07-09
Payer: MEDICARE

## 2024-07-09 DIAGNOSIS — F17.210 CIGARETTE SMOKER: ICD-10-CM

## 2024-07-09 DIAGNOSIS — Z87.891 PERSONAL HISTORY OF TOBACCO USE: ICD-10-CM

## 2024-07-09 DIAGNOSIS — Z12.2 ENCOUNTER FOR SCREENING FOR MALIGNANT NEOPLASM OF RESPIRATORY ORGANS: ICD-10-CM

## 2024-07-09 PROCEDURE — 71271 CT THORAX LUNG CANCER SCR C-: CPT
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
## 2024-08-01 ENCOUNTER — OFFICE VISIT (OUTPATIENT)
Dept: CARDIOLOGY CLINIC | Age: 74
End: 2024-08-01
Payer: MEDICARE

## 2024-08-01 VITALS
SYSTOLIC BLOOD PRESSURE: 118 MMHG | DIASTOLIC BLOOD PRESSURE: 68 MMHG | HEIGHT: 64 IN | BODY MASS INDEX: 20.66 KG/M2 | HEART RATE: 66 BPM | OXYGEN SATURATION: 98 % | WEIGHT: 121 LBS

## 2024-08-01 DIAGNOSIS — Z76.89 ESTABLISHING CARE WITH NEW DOCTOR, ENCOUNTER FOR: ICD-10-CM

## 2024-08-01 DIAGNOSIS — F17.200 SMOKER: ICD-10-CM

## 2024-08-01 DIAGNOSIS — E78.5 DYSLIPIDEMIA: ICD-10-CM

## 2024-08-01 DIAGNOSIS — R06.02 SHORTNESS OF BREATH: ICD-10-CM

## 2024-08-01 DIAGNOSIS — I71.21 ANEURYSM OF ASCENDING AORTA WITHOUT RUPTURE (HCC): ICD-10-CM

## 2024-08-01 DIAGNOSIS — R42 DIZZINESS: Primary | ICD-10-CM

## 2024-08-01 PROCEDURE — 1123F ACP DISCUSS/DSCN MKR DOCD: CPT | Performed by: INTERNAL MEDICINE

## 2024-08-01 PROCEDURE — 93000 ELECTROCARDIOGRAM COMPLETE: CPT | Performed by: INTERNAL MEDICINE

## 2024-08-01 PROCEDURE — 99204 OFFICE O/P NEW MOD 45 MIN: CPT | Performed by: INTERNAL MEDICINE

## 2024-08-01 RX ORDER — ROSUVASTATIN CALCIUM 20 MG/1
20 TABLET, COATED ORAL DAILY
Qty: 90 TABLET | Refills: 3 | Status: SHIPPED | OUTPATIENT
Start: 2024-08-01

## 2024-08-01 NOTE — PATIENT INSTRUCTIONS
Echo to evaluate for heart size and function; Dilated Aorta   Stress myoview to evaluate for possible blockages within the heart arteries: Dilated Aorta   CT calcium score to evaluate for plaque buildup. Expect a cost of $129 as this is not covered by insurance.  Blood work: Lipid, LFT- Fast for 8 hours prior to obtaining blood work.   Recommend starting Aspirin 81 mg daily   Abdominal Ultrasound for AAA   Medications reviewed. Medications refilled as warranted.   Follow up with NP in 3 months or sooner if needed.       Your provider has ordered testing for further evaluation.  An order/prescription has been included in your paper work.   To schedule outpatient testing, contact Central Scheduling by calling [employer] ([phone]).

## 2024-08-05 ENCOUNTER — TELEPHONE (OUTPATIENT)
Dept: PULMONOLOGY | Age: 74
End: 2024-08-05

## 2024-08-13 ENCOUNTER — HOSPITAL ENCOUNTER (OUTPATIENT)
Dept: CARDIOLOGY | Age: 74
Discharge: HOME | End: 2024-08-15
Attending: INTERNAL MEDICINE
Payer: MEDICARE

## 2024-08-13 VITALS
BODY MASS INDEX: 20.66 KG/M2 | SYSTOLIC BLOOD PRESSURE: 118 MMHG | DIASTOLIC BLOOD PRESSURE: 68 MMHG | HEIGHT: 64 IN | WEIGHT: 121 LBS

## 2024-08-13 DIAGNOSIS — I71.21 ANEURYSM OF ASCENDING AORTA WITHOUT RUPTURE (HCC): ICD-10-CM

## 2024-08-13 LAB
ECHO AO ASC DIAM: 3.8 CM
ECHO AO ASCENDING AORTA INDEX: 2.41 CM/M2
ECHO AO ROOT DIAM: 3.5 CM
ECHO AO ROOT INDEX: 2.22 CM/M2
ECHO AV AREA PEAK VELOCITY: 3.5 CM2
ECHO AV AREA VTI: 4 CM2
ECHO AV AREA/BSA PEAK VELOCITY: 2.2 CM2/M2
ECHO AV AREA/BSA VTI: 2.5 CM2/M2
ECHO AV CUSP MM: 1.5 CM
ECHO AV MEAN GRADIENT: 2 MMHG
ECHO AV MEAN VELOCITY: 0.6 M/S
ECHO AV PEAK GRADIENT: 4 MMHG
ECHO AV PEAK VELOCITY: 0.9 M/S
ECHO AV VELOCITY RATIO: 1
ECHO AV VTI: 21.8 CM
ECHO BSA: 1.57 M2
ECHO EST RA PRESSURE: 3 MMHG
ECHO LA AREA 2C: 13.6 CM2
ECHO LA AREA 4C: 10.8 CM2
ECHO LA DIAMETER INDEX: 1.77 CM/M2
ECHO LA DIAMETER: 2.8 CM
ECHO LA MAJOR AXIS: 3.9 CM
ECHO LA MINOR AXIS: 4.4 CM
ECHO LA TO AORTIC ROOT RATIO: 0.8
ECHO LA VOL BP: 29 ML (ref 22–52)
ECHO LA VOL MOD A2C: 34 ML (ref 22–52)
ECHO LA VOL MOD A4C: 23 ML (ref 22–52)
ECHO LA VOL/BSA BIPLANE: 18 ML/M2 (ref 16–34)
ECHO LA VOLUME INDEX MOD A2C: 22 ML/M2 (ref 16–34)
ECHO LA VOLUME INDEX MOD A4C: 15 ML/M2 (ref 16–34)
ECHO LV E' LATERAL VELOCITY: 13 CM/S
ECHO LV E' SEPTAL VELOCITY: 9 CM/S
ECHO LV EDV A2C: 67 ML
ECHO LV EDV A4C: 93 ML
ECHO LV EDV INDEX A4C: 59 ML/M2
ECHO LV EDV NDEX A2C: 42 ML/M2
ECHO LV EJECTION FRACTION A2C: 43 %
ECHO LV EJECTION FRACTION A4C: 39 %
ECHO LV EJECTION FRACTION BIPLANE: 40 % (ref 55–100)
ECHO LV ESV A2C: 39 ML
ECHO LV ESV A4C: 57 ML
ECHO LV ESV INDEX A2C: 25 ML/M2
ECHO LV ESV INDEX A4C: 36 ML/M2
ECHO LV FRACTIONAL SHORTENING: 22 % (ref 28–44)
ECHO LV GLOBAL LONGITUDINAL STRAIN (GLS): -20.3 %
ECHO LV INTERNAL DIMENSION DIASTOLE INDEX: 2.91 CM/M2
ECHO LV INTERNAL DIMENSION DIASTOLIC: 4.6 CM (ref 3.9–5.3)
ECHO LV INTERNAL DIMENSION SYSTOLIC INDEX: 2.28 CM/M2
ECHO LV INTERNAL DIMENSION SYSTOLIC: 3.6 CM
ECHO LV ISOVOLUMETRIC RELAXATION TIME (IVRT): 100 MS
ECHO LV IVSD: 0.7 CM (ref 0.6–0.9)
ECHO LV MASS 2D: 99.3 G (ref 67–162)
ECHO LV MASS INDEX 2D: 62.9 G/M2 (ref 43–95)
ECHO LV POSTERIOR WALL DIASTOLIC: 0.7 CM (ref 0.6–0.9)
ECHO LV RELATIVE WALL THICKNESS RATIO: 0.3
ECHO LVOT AREA: 3.8 CM2
ECHO LVOT AV VTI INDEX: 1.04
ECHO LVOT DIAM: 2.2 CM
ECHO LVOT MEAN GRADIENT: 1 MMHG
ECHO LVOT PEAK GRADIENT: 3 MMHG
ECHO LVOT PEAK VELOCITY: 0.9 M/S
ECHO LVOT STROKE VOLUME INDEX: 54.6 ML/M2
ECHO LVOT SV: 86.2 ML
ECHO LVOT VTI: 22.7 CM
ECHO MV A VELOCITY: 0.76 M/S
ECHO MV E DECELERATION TIME (DT): 169 MS
ECHO MV E VELOCITY: 0.74 M/S
ECHO MV E/A RATIO: 0.97
ECHO MV E/E' LATERAL: 5.69
ECHO MV E/E' RATIO (AVERAGED): 6.96
ECHO MV E/E' SEPTAL: 8.22
ECHO PULMONARY ARTERY END DIASTOLIC PRESSURE: 2 MMHG
ECHO PV MAX VELOCITY: 0.7 M/S
ECHO PV MAX VELOCITY: 0.8 M/S
ECHO PV PEAK GRADIENT: 3 MMHG
ECHO RA AREA 4C: 11 CM2
ECHO RA END SYSTOLIC VOLUME APICAL 4 CHAMBER INDEX BSA: 16 ML/M2
ECHO RA VOLUME: 25 ML
ECHO RIGHT VENTRICULAR SYSTOLIC PRESSURE (RVSP): 17 MMHG
ECHO RV FREE WALL PEAK S': 11 CM/S
ECHO RV TAPSE: 1.8 CM (ref 1.7–?)
ECHO TV REGURGITANT MAX VELOCITY: 1.85 M/S
ECHO TV REGURGITANT PEAK GRADIENT: 14 MMHG

## 2024-08-13 PROCEDURE — 93356 MYOCRD STRAIN IMG SPCKL TRCK: CPT | Performed by: INTERNAL MEDICINE

## 2024-08-13 PROCEDURE — 93306 TTE W/DOPPLER COMPLETE: CPT

## 2024-08-13 PROCEDURE — 93306 TTE W/DOPPLER COMPLETE: CPT | Performed by: INTERNAL MEDICINE

## 2024-08-14 ENCOUNTER — HOSPITAL ENCOUNTER (OUTPATIENT)
Age: 74
Discharge: HOME | End: 2024-08-16
Attending: INTERNAL MEDICINE
Payer: MEDICARE

## 2024-08-14 ENCOUNTER — HOSPITAL ENCOUNTER (OUTPATIENT)
Dept: NUCLEAR MEDICINE | Age: 74
Discharge: HOME | End: 2024-08-14
Attending: INTERNAL MEDICINE
Payer: MEDICARE

## 2024-08-14 DIAGNOSIS — R06.02 SHORTNESS OF BREATH: ICD-10-CM

## 2024-08-14 LAB
NUC STRESS EJECTION FRACTION: 57 %
NUC STRESS LV EDV: 68 ML (ref 56–104)
NUC STRESS LV ESV: 29 ML (ref 19–49)
NUC STRESS LV MASS: 108 G
STRESS BASELINE DIAS BP: 82 MMHG
STRESS BASELINE HR: 68 BPM
STRESS BASELINE SYS BP: 114 MMHG
STRESS ESTIMATED WORKLOAD: 5.6 METS
STRESS EXERCISE DUR MIN: 5 MIN
STRESS EXERCISE DUR SEC: 1 SEC
STRESS PEAK DIAS BP: 73 MMHG
STRESS PEAK SYS BP: 162 MMHG
STRESS PERCENT HR ACHIEVED: 87 %
STRESS POST PEAK HR: 129 BPM
STRESS RATE PRESSURE PRODUCT: NORMAL BPM*MMHG
STRESS TARGET HR: 148 BPM

## 2024-08-14 PROCEDURE — A9502 TC99M TETROFOSMIN: HCPCS | Performed by: INTERNAL MEDICINE

## 2024-08-14 PROCEDURE — 93016 CV STRESS TEST SUPVJ ONLY: CPT | Performed by: INTERNAL MEDICINE

## 2024-08-14 PROCEDURE — 78452 HT MUSCLE IMAGE SPECT MULT: CPT

## 2024-08-14 PROCEDURE — 78452 HT MUSCLE IMAGE SPECT MULT: CPT | Performed by: INTERNAL MEDICINE

## 2024-08-14 PROCEDURE — 3430000000 HC RX DIAGNOSTIC RADIOPHARMACEUTICAL: Performed by: INTERNAL MEDICINE

## 2024-08-14 PROCEDURE — 93017 CV STRESS TEST TRACING ONLY: CPT

## 2024-08-14 PROCEDURE — 93018 CV STRESS TEST I&R ONLY: CPT | Performed by: INTERNAL MEDICINE

## 2024-08-14 RX ADMIN — TETROFOSMIN 11.1 MILLICURIE: 1.38 INJECTION, POWDER, LYOPHILIZED, FOR SOLUTION INTRAVENOUS at 07:35

## 2024-08-14 RX ADMIN — TETROFOSMIN 32 MILLICURIE: 1.38 INJECTION, POWDER, LYOPHILIZED, FOR SOLUTION INTRAVENOUS at 09:20

## 2024-08-14 NOTE — NURSING NOTE
Pt completed stress portion of cardiac stress test. Pt met target heart rate, denied chest pain, complained of being winded. Symptom resolved with rest. Pt is discharged to nuclear department for final scan. Nuclear tech will remove PIV. Discharge instructions given to pt. Pt verbalizes understanding to discharge instructions.

## 2024-08-14 NOTE — NURSING NOTE
Patient arrived to stress lab for myoview stress test.  Patient was educated on procedure, all questions answered, and consent verified.

## 2024-08-17 ENCOUNTER — HOSPITAL ENCOUNTER (OUTPATIENT)
Dept: ULTRASOUND IMAGING | Age: 74
Discharge: HOME | End: 2024-08-17
Attending: INTERNAL MEDICINE
Payer: MEDICARE

## 2024-08-17 ENCOUNTER — HOSPITAL ENCOUNTER (OUTPATIENT)
Age: 74
Discharge: HOME | End: 2024-08-17
Attending: INTERNAL MEDICINE
Payer: MEDICARE

## 2024-08-17 ENCOUNTER — HOSPITAL ENCOUNTER (OUTPATIENT)
Dept: CT IMAGING | Age: 74
Discharge: HOME | End: 2024-08-17
Attending: INTERNAL MEDICINE
Payer: MEDICARE

## 2024-08-17 DIAGNOSIS — E78.5 DYSLIPIDEMIA: ICD-10-CM

## 2024-08-17 DIAGNOSIS — I71.21 ANEURYSM OF ASCENDING AORTA WITHOUT RUPTURE (HCC): ICD-10-CM

## 2024-08-17 LAB
ALBUMIN SERPL-MCNC: 4.1 G/DL (ref 3.4–5)
ALP SERPL-CCNC: 75 U/L (ref 40–129)
ALT SERPL-CCNC: 13 U/L (ref 10–40)
AST SERPL-CCNC: 19 U/L (ref 15–37)
BILIRUB DIRECT SERPL-MCNC: 0.2 MG/DL (ref 0–0.3)
BILIRUB INDIRECT SERPL-MCNC: 0.3 MG/DL (ref 0–1)
BILIRUB SERPL-MCNC: 0.5 MG/DL (ref 0–1)
CHOLEST SERPL-MCNC: 143 MG/DL (ref 0–199)
HDLC SERPL-MCNC: 53 MG/DL (ref 40–60)
LDL CHOLESTEROL: 61 MG/DL
PROT SERPL-MCNC: 6.7 G/DL (ref 6.4–8.2)
T3FREE SERPL-MCNC: 2.8 PG/ML (ref 2.3–4.2)
T4 FREE SERPL-MCNC: 1.2 NG/DL (ref 0.9–1.8)
TRIGL SERPL-MCNC: 143 MG/DL (ref 0–150)
TSH SERPL DL<=0.005 MIU/L-ACNC: 0.61 UIU/ML (ref 0.27–4.2)
VLDLC SERPL CALC-MCNC: 29 MG/DL

## 2024-08-17 PROCEDURE — 76775 US EXAM ABDO BACK WALL LIM: CPT

## 2024-08-17 PROCEDURE — 75571 CT HRT W/O DYE W/CA TEST: CPT

## 2024-09-19 ENCOUNTER — OFFICE VISIT (OUTPATIENT)
Dept: PULMONOLOGY | Age: 74
End: 2024-09-19
Payer: MEDICARE

## 2024-09-19 ENCOUNTER — TELEPHONE (OUTPATIENT)
Dept: PULMONOLOGY | Age: 74
End: 2024-09-19

## 2024-09-19 VITALS
HEIGHT: 64 IN | BODY MASS INDEX: 20.66 KG/M2 | WEIGHT: 121 LBS | OXYGEN SATURATION: 97 % | HEART RATE: 67 BPM | RESPIRATION RATE: 14 BRPM | SYSTOLIC BLOOD PRESSURE: 130 MMHG | DIASTOLIC BLOOD PRESSURE: 76 MMHG

## 2024-09-19 DIAGNOSIS — R91.8 LUNG NODULES: Primary | ICD-10-CM

## 2024-09-19 PROCEDURE — G0008 ADMIN INFLUENZA VIRUS VAC: HCPCS | Performed by: INTERNAL MEDICINE

## 2024-09-19 PROCEDURE — 1123F ACP DISCUSS/DSCN MKR DOCD: CPT | Performed by: INTERNAL MEDICINE

## 2024-09-19 PROCEDURE — 99204 OFFICE O/P NEW MOD 45 MIN: CPT | Performed by: INTERNAL MEDICINE

## 2024-09-19 PROCEDURE — 90653 IIV ADJUVANT VACCINE IM: CPT | Performed by: INTERNAL MEDICINE

## 2024-09-19 RX ORDER — ALBUTEROL SULFATE 90 UG/1
2 INHALANT RESPIRATORY (INHALATION) EVERY 4 HOURS PRN
Qty: 18 G | Refills: 6 | Status: SHIPPED | OUTPATIENT
Start: 2024-09-19

## 2024-10-23 ENCOUNTER — OFFICE VISIT (OUTPATIENT)
Dept: ENDOCRINOLOGY | Age: 74
End: 2024-10-23
Payer: MEDICARE

## 2024-10-23 VITALS
HEIGHT: 64 IN | HEART RATE: 72 BPM | WEIGHT: 121 LBS | SYSTOLIC BLOOD PRESSURE: 140 MMHG | DIASTOLIC BLOOD PRESSURE: 87 MMHG | BODY MASS INDEX: 20.66 KG/M2

## 2024-10-23 DIAGNOSIS — E78.5 DYSLIPIDEMIA: ICD-10-CM

## 2024-10-23 DIAGNOSIS — E04.1 THYROID NODULE GREATER THAN OR EQUAL TO 1.5 CM IN DIAMETER INCIDENTALLY NOTED ON IMAGING STUDY: Primary | ICD-10-CM

## 2024-10-23 PROCEDURE — 1159F MED LIST DOCD IN RCRD: CPT | Performed by: INTERNAL MEDICINE

## 2024-10-23 PROCEDURE — 1123F ACP DISCUSS/DSCN MKR DOCD: CPT | Performed by: INTERNAL MEDICINE

## 2024-10-23 PROCEDURE — G2211 COMPLEX E/M VISIT ADD ON: HCPCS | Performed by: INTERNAL MEDICINE

## 2024-10-23 PROCEDURE — 99204 OFFICE O/P NEW MOD 45 MIN: CPT | Performed by: INTERNAL MEDICINE

## 2024-10-23 NOTE — PROGRESS NOTES
[facility] Endocrinology        Chief Complaint   Patient presents with    New Patient    Thyroid Problem       Connie Roberts is a pleasant 74 y.o. year old female here for evaluation of thyroid nodule. She has breast cancer status postmastectomy in 2021, currently on letrozole, hyperlipidemia, hard of hearing, allergic sinusitis, reflux and has a BMI of 20.     I reviewed available records.  In July 2024, patient had CT lung screen completed which showed subcentimeter solid lung nodules.  This also incidentally noted left-sided thyroid nodule measuring 1.9 cm.  Nonemergent thyroid ultrasound was recommended.  TSH was checked in August 2024 with normal results of 0.61.    Of note, CTA of head and neck completed in January 2021 showed several hypoattenuating nodules within the thyroid gland, largest measuring 14 mm within the left thyroid lobe.  Patient had not had an ultrasound yet.  She denies any difficulty swallowing or breathing.  No changes in voice.  No family history of thyroid cancer.  Sister passed away from colon cancer and she is planning to complete colonoscopy soon.  She had mastectomy for breast cancer and is currently on letrozole.  She reports heat intolerance.    Patient is a smoker, less than a pack per day.  No illicit drug use.  She is retired.      ALLERGIES:  Allergies   Allergen Reactions    Dye [Barium-Containing Compounds] Other (See Comments)     DISORIENTED- States that this was in her 30's     Pcn [Penicillins] Swelling     RASH        MEDICATIONS:  Outpatient Medications Marked as Taking for the 10/23/24 encounter (Office Visit) with Mili Alvarez MD   Medication Sig Dispense Refill    albuterol sulfate HFA (PROVENTIL HFA) 108 (90 Base) MCG/ACT inhaler Inhale 2 puffs into the lungs every 4 hours as needed for Wheezing or Shortness of Breath 18 g 6    HYDROcodone-acetaminophen (NORCO) 5-325 MG per tablet Take 1 tablet by mouth 2 times daily for 30 days. Max Daily Amount: 2 tablets 60

## 2024-11-19 ENCOUNTER — HOSPITAL ENCOUNTER (OUTPATIENT)
Dept: ULTRASOUND IMAGING | Age: 74
Discharge: HOME | End: 2024-11-19
Attending: INTERNAL MEDICINE
Payer: MEDICARE

## 2024-11-19 ENCOUNTER — HOSPITAL ENCOUNTER (OUTPATIENT)
Dept: CT IMAGING | Age: 74
Discharge: HOME | End: 2024-11-19
Attending: INTERNAL MEDICINE
Payer: MEDICARE

## 2024-11-19 DIAGNOSIS — E04.1 THYROID NODULE GREATER THAN OR EQUAL TO 1.5 CM IN DIAMETER INCIDENTALLY NOTED ON IMAGING STUDY: ICD-10-CM

## 2024-11-19 DIAGNOSIS — R91.8 LUNG NODULES: ICD-10-CM

## 2024-11-19 PROCEDURE — 76536 US EXAM OF HEAD AND NECK: CPT

## 2024-11-19 PROCEDURE — 71250 CT THORAX DX C-: CPT

## 2024-12-02 ENCOUNTER — HOSPITAL ENCOUNTER (OUTPATIENT)
Dept: PULMONOLOGY | Age: 74
Discharge: HOME | End: 2024-12-02
Payer: MEDICARE

## 2024-12-02 DIAGNOSIS — R91.8 LUNG NODULES: ICD-10-CM

## 2024-12-02 PROCEDURE — 94618 PULMONARY STRESS TESTING: CPT

## 2024-12-05 DIAGNOSIS — E04.1 THYROID NODULE GREATER THAN OR EQUAL TO 1.5 CM IN DIAMETER INCIDENTALLY NOTED ON IMAGING STUDY: Primary | ICD-10-CM

## 2024-12-10 ENCOUNTER — HOSPITAL ENCOUNTER (OUTPATIENT)
Dept: WOMENS IMAGING | Age: 74
Discharge: HOME | End: 2024-12-10
Payer: MEDICARE

## 2024-12-10 ENCOUNTER — TELEPHONE (OUTPATIENT)
Dept: ENDOCRINOLOGY | Age: 74
End: 2024-12-10

## 2024-12-10 VITALS — WEIGHT: 121 LBS | HEIGHT: 64 IN | BODY MASS INDEX: 20.66 KG/M2

## 2024-12-10 DIAGNOSIS — Z12.31 SCREENING MAMMOGRAM, ENCOUNTER FOR: ICD-10-CM

## 2024-12-10 PROCEDURE — 77063 BREAST TOMOSYNTHESIS BI: CPT

## 2024-12-10 NOTE — TELEPHONE ENCOUNTER
----- Message from Dr. Mili Alvarez MD sent at 12/5/2024  9:16 AM EST -----  Please advise patient that her thyroid ultrasound showed multiple small thyroid nodules.  One large nodule was noted within the left side of the thyroid measuring 2 cm.  This appears to be to have areas of fluid within the nodule making it look like a cyst.  It is recommended to complete a biopsy for the solid part of the nodule.  Order placed for FNA.  May complete that at her convenience.

## 2024-12-11 NOTE — PROGRESS NOTES
HDL Cholesterol 40 - 60 mg/dL 53   LDL Cholesterol <100 mg/dL 61   VLDL Not Established mg/dL 29   Triglyceride, Fasting 0 - 150 mg/dL 143       Cardiac Data     Last EKG:    sbrady, lt axis, nonspec t wave changes     Echo: 8/13/2024    Image quality is adequate. Technically difficult study due to low parasternal window and technically difficult study due to patient's heart rhythm.    Left Ventricle: Mild-moderately reduced left ventricular systolic function with a visually estimated EF of 40 - 45%. Left ventricle size is normal. Normal wall thickness. Mild-moderate global hypokinesis present. Global longitudinal strain is -20.3%. Grade I diastolic dysfunction with normal LAP.    Aorta: Normal sized aortic root. Mildly dilated ascending aorta. Ao ascending diameter is 3.8 cm.    Right Ventricle: Right ventricle size is normal. Normal systolic function. TAPSE is 1.8 cm.    Aortic Valve: Mild sclerosis of the aortic valve cusps. Trace regurgitation.    Mitral Valve: Mild annular calcification at the anterior and posterior leaflets. Trace regurgitation.    Tricuspid Valve: Trace regurgitation. The estimated RVSP is 17 mmHg.    Stress Test: 8/14/2024    Image quality is fair.    Stress Test: A Edil protocol stress test was performed. Overall, the patient's exercise capacity was average for their age. The patient reached stage 2 of the protocol and was stressed for 5 min and 1 sec. The patient achieved the target heart rate. Blood pressure demonstrated a normal response to stress.    Perfusion Comments: LV perfusion is normal. There is no evidence of inducible ischemia.  There is no evidence of prior infarction.  Breast attenuation present septal wall.    Stress Function: Left ventricular function post-stress is normal. Post-stress ejection fraction is 57%. The stress end diastolic cavity size is normal. Stress end diastolic volume: 68 mL. Stress end systolic volume: 29 mL. LV mass: 108.0 g.    Perfusion Conclusion:

## 2024-12-11 NOTE — TELEPHONE ENCOUNTER
Pt called back and was given message    She is going to call [facility] Scheduling [phone] to schedule her FNA    No further questions

## 2024-12-12 ENCOUNTER — OFFICE VISIT (OUTPATIENT)
Dept: CARDIOLOGY CLINIC | Age: 74
End: 2024-12-12
Payer: MEDICARE

## 2024-12-12 VITALS
BODY MASS INDEX: 20.66 KG/M2 | DIASTOLIC BLOOD PRESSURE: 72 MMHG | SYSTOLIC BLOOD PRESSURE: 128 MMHG | WEIGHT: 121 LBS | HEART RATE: 80 BPM | OXYGEN SATURATION: 97 % | HEIGHT: 64 IN

## 2024-12-12 DIAGNOSIS — E78.5 DYSLIPIDEMIA: ICD-10-CM

## 2024-12-12 DIAGNOSIS — R93.1 ABNORMAL ECHOCARDIOGRAM: ICD-10-CM

## 2024-12-12 DIAGNOSIS — F17.200 SMOKER: ICD-10-CM

## 2024-12-12 DIAGNOSIS — R42 DIZZINESS: Primary | ICD-10-CM

## 2024-12-12 PROCEDURE — 99214 OFFICE O/P EST MOD 30 MIN: CPT | Performed by: INTERNAL MEDICINE

## 2024-12-12 PROCEDURE — 1123F ACP DISCUSS/DSCN MKR DOCD: CPT | Performed by: INTERNAL MEDICINE

## 2024-12-12 PROCEDURE — 1159F MED LIST DOCD IN RCRD: CPT | Performed by: INTERNAL MEDICINE

## 2024-12-12 RX ORDER — ASPIRIN 81 MG/1
81 TABLET ORAL DAILY
COMMUNITY
Start: 2024-12-12

## 2024-12-12 NOTE — PATIENT INSTRUCTIONS
Continue current cardiac medications:   Start taking Aspirin 81 mg daily; take with a full glass of water.  Cardiac MRI to assess heart strength and function to assess heart weakness.   Discussed need for yearly surveillance on AAA.    Follow up with me in 1 year or sooner if needed.     Your provider has ordered testing for further evaluation.  An order/prescription has been included in your paper work.   To schedule outpatient testing, contact Central Scheduling by calling [address] [place], [place] [address] ([phone]).

## 2024-12-16 ENCOUNTER — TELEPHONE (OUTPATIENT)
Dept: INTERVENTIONAL RADIOLOGY/VASCULAR | Age: 74
End: 2024-12-16

## 2024-12-16 NOTE — TELEPHONE ENCOUNTER
Called and spoke to Patient about upcoming procedure. Phone number used: [phone]  Procedure: Thyroid nodule biopsy /FNA  Approving Radiologist: Diana     Pt informed of the following:  Date of procedure: 01/28/25   ** Pt requested to be scheduled this far out pt requested last week of January or February.   Arrival time of procedure: 0930  Time of procedure: 1000  Check in at Main Entrance prior to procedure.

## 2025-01-08 ENCOUNTER — OFFICE VISIT (OUTPATIENT)
Dept: PULMONOLOGY | Age: 75
End: 2025-01-08
Payer: MEDICARE

## 2025-01-08 VITALS
HEART RATE: 83 BPM | SYSTOLIC BLOOD PRESSURE: 104 MMHG | DIASTOLIC BLOOD PRESSURE: 70 MMHG | WEIGHT: 118.6 LBS | BODY MASS INDEX: 20.25 KG/M2 | HEIGHT: 64 IN | OXYGEN SATURATION: 97 % | RESPIRATION RATE: 17 BRPM

## 2025-01-08 DIAGNOSIS — Z87.891 PERSONAL HISTORY OF TOBACCO USE: ICD-10-CM

## 2025-01-08 DIAGNOSIS — J44.9 CHRONIC OBSTRUCTIVE PULMONARY DISEASE, UNSPECIFIED COPD TYPE (HCC): Primary | ICD-10-CM

## 2025-01-08 PROCEDURE — G0296 VISIT TO DETERM LDCT ELIG: HCPCS | Performed by: INTERNAL MEDICINE

## 2025-01-08 PROCEDURE — G2211 COMPLEX E/M VISIT ADD ON: HCPCS | Performed by: INTERNAL MEDICINE

## 2025-01-08 PROCEDURE — 1159F MED LIST DOCD IN RCRD: CPT | Performed by: INTERNAL MEDICINE

## 2025-01-08 PROCEDURE — 99214 OFFICE O/P EST MOD 30 MIN: CPT | Performed by: INTERNAL MEDICINE

## 2025-01-08 PROCEDURE — 1123F ACP DISCUSS/DSCN MKR DOCD: CPT | Performed by: INTERNAL MEDICINE

## 2025-01-08 NOTE — PROGRESS NOTES
software. Every effort was made to ensure accuracy; however, inadvertent computerized transcription errors may be present.     Discussed with the patient the current USPSTF guidelines released March 9, 2021 for screening for lung cancer.    For adults aged 50 to 80 years who have a 20 pack-year smoking history and currently smoke or have quit within the past 15 years the grade B recommendation is to:  Screen for lung cancer with low-dose computed tomography (LDCT) every year.  Stop screening once a person has not smoked for 15 years or has a health problem that limits life expectancy or the ability to have lung surgery.    The patient  reports that she has been smoking cigarettes. She started smoking about 57 years ago. She has a 28.5 pack-year smoking history. She has been exposed to tobacco smoke. She has never used smokeless tobacco.. Discussed with patient the risks and benefits of screening, including over-diagnosis, false positive rate, and total radiation exposure.  The patient currently exhibits no signs or symptoms suggestive of lung cancer.  Discussed with patient the importance of compliance with yearly annual lung cancer screenings and willingness to undergo diagnosis and treatment if screening scan is positive.  In addition, the patient was counseled regarding the importance of remaining smoke free and/or total smoking cessation.    Also reviewed the following if the patient has Medicare that as of February 10, 2022, Medicare only covers LDCT screening in patients aged 50-77 with at least a 20 pack-year smoking history who currently smoke or have quit in the last 15 years

## 2025-01-28 ENCOUNTER — HOSPITAL ENCOUNTER (OUTPATIENT)
Dept: ULTRASOUND IMAGING | Age: 75
Discharge: HOME | End: 2025-01-28
Payer: MEDICARE

## 2025-01-28 VITALS — DIASTOLIC BLOOD PRESSURE: 84 MMHG | SYSTOLIC BLOOD PRESSURE: 140 MMHG | OXYGEN SATURATION: 97 % | HEART RATE: 63 BPM

## 2025-01-28 DIAGNOSIS — E04.1 THYROID NODULE GREATER THAN OR EQUAL TO 1.5 CM IN DIAMETER INCIDENTALLY NOTED ON IMAGING STUDY: ICD-10-CM

## 2025-01-28 PROCEDURE — 10005 FNA BX W/US GDN 1ST LES: CPT

## 2025-01-28 PROCEDURE — 88173 CYTOPATH EVAL FNA REPORT: CPT

## 2025-01-28 PROCEDURE — 88305 TISSUE EXAM BY PATHOLOGIST: CPT

## 2025-01-28 NOTE — PROGRESS NOTES
Image Guided left thyroid FNA completed. 5 passes completed on the mid left thyroid nodule. Specimens sent with lab who was present for procedure. Pt tolerated procedure without any signs or symptoms of distress. Vital signs stable.      Pt cleared for discharge. Pt denies any pain or pressure at the aspiration procedure site. Aspiration site dressing clean, dry, and intact. Pt verbalizes understanding of the discharge instructions and states no more questions. Pt discharged in stable condition with all belongings.     Vital Signs  Vitals:    01/28/25 1000   BP: (!) 140/84   Pulse: 63   SpO2: 97%    (vital signs in table format)

## 2025-01-28 NOTE — PROGRESS NOTES
Pt arrived for Image Guided left Thyroid Biopsy. Dr. Covarrubias explained the procedure including the risk and benefits of the procedure. All questions answered. Pt verbalizes understanding of the procedure and states no more questions. Consent confirmed. Vital signs stable. Labs, allergies, medications, and code status reviewed. No contraindications noted. Time out completed prior to procedure.      Vital Signs  Vitals:    01/28/25 0930   BP: (!) 130/97   Pulse: 63   SpO2: 97%    (vital signs in table format)

## 2025-02-10 ENCOUNTER — TRANSCRIBE ORDERS (OUTPATIENT)
Dept: ADMINISTRATIVE | Age: 75
End: 2025-02-10

## 2025-02-10 DIAGNOSIS — N20.2 CALCULUS OF KIDNEY WITH CALCULUS OF URETER: Primary | ICD-10-CM

## 2025-03-01 ENCOUNTER — HOSPITAL ENCOUNTER (OUTPATIENT)
Dept: CT IMAGING | Age: 75
Discharge: HOME | End: 2025-03-01
Payer: MEDICARE

## 2025-03-01 DIAGNOSIS — N20.2 CALCULUS OF KIDNEY WITH CALCULUS OF URETER: ICD-10-CM

## 2025-03-01 LAB
PERFORMED ON: NORMAL
POC CREATININE: 0.9 MG/DL (ref 0.6–1.2)
POC SAMPLE TYPE: NORMAL

## 2025-03-01 PROCEDURE — 6360000004 HC RX CONTRAST MEDICATION

## 2025-03-01 PROCEDURE — 74178 CT ABD&PLV WO CNTR FLWD CNTR: CPT

## 2025-03-01 PROCEDURE — 82565 ASSAY OF CREATININE: CPT

## 2025-03-01 RX ORDER — IOPAMIDOL 755 MG/ML
75 INJECTION, SOLUTION INTRAVASCULAR
Status: COMPLETED | OUTPATIENT
Start: 2025-03-01 | End: 2025-03-01

## 2025-03-01 RX ADMIN — IOPAMIDOL 75 ML: 755 INJECTION, SOLUTION INTRAVENOUS at 08:44

## 2025-03-01 NOTE — PROGRESS NOTES
SW pt, had CT scan in 2021 with contrast, said she did not have a reaction.    Chart states she had a dizziness reaction with contrast but her reaction was 20+ yrs ago with a CT scan

## 2025-06-16 ENCOUNTER — HOSPITAL ENCOUNTER (EMERGENCY)
Age: 75
Discharge: HOME | End: 2025-06-16
Attending: STUDENT IN AN ORGANIZED HEALTH CARE EDUCATION/TRAINING PROGRAM
Payer: MEDICARE

## 2025-06-16 ENCOUNTER — APPOINTMENT (OUTPATIENT)
Dept: CT IMAGING | Age: 75
End: 2025-06-16
Payer: MEDICARE

## 2025-06-16 ENCOUNTER — APPOINTMENT (OUTPATIENT)
Dept: GENERAL RADIOLOGY | Age: 75
End: 2025-06-16
Payer: MEDICARE

## 2025-06-16 VITALS
TEMPERATURE: 98.4 F | SYSTOLIC BLOOD PRESSURE: 140 MMHG | DIASTOLIC BLOOD PRESSURE: 75 MMHG | WEIGHT: 108 LBS | RESPIRATION RATE: 18 BRPM | BODY MASS INDEX: 18.54 KG/M2 | OXYGEN SATURATION: 95 % | HEART RATE: 72 BPM

## 2025-06-16 DIAGNOSIS — J68.0: Primary | ICD-10-CM

## 2025-06-16 LAB
ALBUMIN SERPL-MCNC: 3.5 G/DL (ref 3.4–5)
ALBUMIN/GLOB SERPL: 1 {RATIO} (ref 1.1–2.2)
ALP SERPL-CCNC: 95 U/L (ref 40–129)
ALT SERPL-CCNC: 11 U/L (ref 10–40)
ANION GAP SERPL CALCULATED.3IONS-SCNC: 12 MMOL/L (ref 3–16)
AST SERPL-CCNC: 24 U/L (ref 15–37)
BASOPHILS # BLD: 0 K/UL (ref 0–0.2)
BASOPHILS NFR BLD: 0.6 %
BILIRUB SERPL-MCNC: <0.2 MG/DL (ref 0–1)
BUN SERPL-MCNC: 9 MG/DL (ref 7–20)
CALCIUM SERPL-MCNC: 9.6 MG/DL (ref 8.3–10.6)
CHLORIDE SERPL-SCNC: 103 MMOL/L (ref 99–110)
CO2 SERPL-SCNC: 24 MMOL/L (ref 21–32)
CREAT SERPL-MCNC: 0.6 MG/DL (ref 0.6–1.2)
DEPRECATED RDW RBC AUTO: 13.5 % (ref 12.4–15.4)
EOSINOPHIL # BLD: 0 K/UL (ref 0–0.6)
EOSINOPHIL NFR BLD: 0.1 %
GFR SERPLBLD CREATININE-BSD FMLA CKD-EPI: >90 ML/MIN/{1.73_M2}
GLUCOSE SERPL-MCNC: 99 MG/DL (ref 70–99)
HCT VFR BLD AUTO: 39.1 % (ref 36–48)
HGB BLD-MCNC: 13.5 G/DL (ref 12–16)
LYMPHOCYTES # BLD: 1 K/UL (ref 1–5.1)
LYMPHOCYTES NFR BLD: 12.3 %
MAGNESIUM SERPL-MCNC: 1.72 MG/DL (ref 1.8–2.4)
MCH RBC QN AUTO: 30.4 PG (ref 26–34)
MCHC RBC AUTO-ENTMCNC: 34.5 G/DL (ref 31–36)
MCV RBC AUTO: 88.2 FL (ref 80–100)
MONOCYTES # BLD: 0.6 K/UL (ref 0–1.3)
MONOCYTES NFR BLD: 7.3 %
NEUTROPHILS # BLD: 6.8 K/UL (ref 1.7–7.7)
NEUTROPHILS NFR BLD: 79.7 %
PLATELET # BLD AUTO: 232 K/UL (ref 135–450)
PMV BLD AUTO: 7.6 FL (ref 5–10.5)
POTASSIUM SERPL-SCNC: 3.4 MMOL/L (ref 3.5–5.1)
PROT SERPL-MCNC: 6.9 G/DL (ref 6.4–8.2)
RBC # BLD AUTO: 4.43 M/UL (ref 4–5.2)
SODIUM SERPL-SCNC: 139 MMOL/L (ref 136–145)
WBC # BLD AUTO: 8.6 K/UL (ref 4–11)

## 2025-06-16 PROCEDURE — 85025 COMPLETE CBC W/AUTO DIFF WBC: CPT

## 2025-06-16 PROCEDURE — 36415 COLL VENOUS BLD VENIPUNCTURE: CPT

## 2025-06-16 PROCEDURE — 99284 EMERGENCY DEPT VISIT MOD MDM: CPT

## 2025-06-16 PROCEDURE — 71046 X-RAY EXAM CHEST 2 VIEWS: CPT

## 2025-06-16 PROCEDURE — 83735 ASSAY OF MAGNESIUM: CPT

## 2025-06-16 PROCEDURE — 80053 COMPREHEN METABOLIC PANEL: CPT

## 2025-06-16 PROCEDURE — 6370000000 HC RX 637 (ALT 250 FOR IP)

## 2025-06-16 PROCEDURE — 71250 CT THORAX DX C-: CPT

## 2025-06-16 RX ORDER — PREDNISONE 20 MG/1
20 TABLET ORAL DAILY
Qty: 5 TABLET | Refills: 0 | Status: SHIPPED | OUTPATIENT
Start: 2025-06-16 | End: 2025-06-21

## 2025-06-16 RX ORDER — ALBUTEROL SULFATE 0.83 MG/ML
SOLUTION RESPIRATORY (INHALATION)
Status: DISCONTINUED
Start: 2025-06-16 | End: 2025-06-16 | Stop reason: WASHOUT

## 2025-06-16 RX ORDER — ALBUTEROL SULFATE 90 UG/1
2 INHALANT RESPIRATORY (INHALATION) ONCE
Status: COMPLETED | OUTPATIENT
Start: 2025-06-16 | End: 2025-06-16

## 2025-06-16 RX ORDER — ALBUTEROL SULFATE 90 UG/1
INHALANT RESPIRATORY (INHALATION)
Status: COMPLETED
Start: 2025-06-16 | End: 2025-06-16

## 2025-06-16 RX ADMIN — ALBUTEROL SULFATE 2 PUFF: 90 INHALANT RESPIRATORY (INHALATION) at 17:15

## 2025-06-16 RX ADMIN — ALBUTEROL SULFATE 2 PUFF: 90 AEROSOL, METERED RESPIRATORY (INHALATION) at 17:15

## 2025-06-16 ASSESSMENT — LIFESTYLE VARIABLES
HOW OFTEN DO YOU HAVE A DRINK CONTAINING ALCOHOL: NEVER
HOW MANY STANDARD DRINKS CONTAINING ALCOHOL DO YOU HAVE ON A TYPICAL DAY: PATIENT DOES NOT DRINK

## 2025-06-16 ASSESSMENT — PAIN - FUNCTIONAL ASSESSMENT: PAIN_FUNCTIONAL_ASSESSMENT: NONE - DENIES PAIN

## 2025-06-16 NOTE — DISCHARGE INSTRUCTIONS
Please follow-up with your primary care.  If you have any worsening symptoms please return to the emergency room for reevaluation.  Take medications as prescribed.  Please follow-up with your pulmonologist to go over your CT scan findings.

## 2025-06-17 NOTE — ED PROVIDER NOTES
[facility] EMERGENCY DEPARTMENT     EMERGENCY DEPARTMENT ENCOUNTER            Pt Name: Connie Roberts   MRN: [id number]   Birthdate 1950   Date of evaluation: 6/16/2025   Provider: Magan Reynoso MD   PCP: Amy Mahajan, GER - CNP   Note Started: 11:17 PM EDT 6/16/25          CHIEF COMPLAINT     Chief Complaint   Patient presents with    Chemical Exposure     Cleaning with bleach and lysol, states coughing since she inhaled the mixture              HISTORY OF PRESENT ILLNESS:   History from : Patient   Limitations to history : None     Connie Roberts is a 75 y.o. female who presents with a chief complaint of chemicals [place].  Patient was cleaning with household cleaning products.  Not increased concentration.  She mix Lysol and bleach was cleaning a bathtub it was not ventilated and she states that she started coughing.  She is wondering if this is dangerous and wants evaluated.  She states that she did not have the fan on.  She was not in there for significantly at the time.  Is an every day smoker but has not officially been diagnosed with COPD.  Does however have breathing treatments at home.  Also has a pulmonologist for her known nodules.      Nursing Notes were all reviewed and agreed with, or any disagreements were addressed in the HPI.     REVIEW OF SYSTEMS :    Positives and Pertinent negatives as per HPI.      MEDICAL HISTORY   has a past medical history of Allergic sinusitis, Back pain, Breast cancer (HCC), History of therapeutic radiation, [place] (hard of hearing), Hyperlipidemia, Malignant neoplasm of upper-inner quadrant of right breast in female, estrogen receptor positive (HCC) (12/11/2020), Reflux, and Thyroid nodule greater than or equal to 1.5 cm in diameter incidentally noted on imaging study (10/23/2024).    Past Surgical History:   Procedure Laterality Date    ANKLE SURGERY Left     ARTHRITIS, BONE SPUR    CHOLECYSTECTOMY      COLONOSCOPY  2005    normal    COLONOSCOPY

## 2025-06-22 ENCOUNTER — TELEPHONE (OUTPATIENT)
Dept: PULMONOLOGY | Age: 75
End: 2025-06-22

## 2025-06-22 NOTE — TELEPHONE ENCOUNTER
CT Chest 6/16/25    IMPRESSION:  Limited exam due to the lack of IV contrast with moderate reticulonodular  opacities along the superior segment left lower lobe with the largest nodule  measuring 2.6 cm.  These is suspicious for a neoplastic process with  questionable mild postobstructive atelectasis or early reticulonodular  infiltrates extending inferiorly.  Recommend pulmonology consultation and  suggest PET scan correlation    Pt does not see you until December.  Thanks!    Mi XIAO(R)  Patient Navigator  Incidentals/Lung Navigation  [email or web address]

## 2025-07-02 ENCOUNTER — TELEPHONE (OUTPATIENT)
Dept: PULMONOLOGY | Age: 75
End: 2025-07-02

## 2025-07-02 ENCOUNTER — PREP FOR PROCEDURE (OUTPATIENT)
Dept: PULMONOLOGY | Age: 75
End: 2025-07-02

## 2025-07-02 ENCOUNTER — OFFICE VISIT (OUTPATIENT)
Dept: PULMONOLOGY | Age: 75
End: 2025-07-02
Payer: MEDICARE

## 2025-07-02 VITALS
HEIGHT: 64 IN | WEIGHT: 109.8 LBS | RESPIRATION RATE: 17 BRPM | BODY MASS INDEX: 18.74 KG/M2 | HEART RATE: 79 BPM | OXYGEN SATURATION: 99 % | DIASTOLIC BLOOD PRESSURE: 80 MMHG | SYSTOLIC BLOOD PRESSURE: 132 MMHG

## 2025-07-02 DIAGNOSIS — R91.8 LUNG MASS: Primary | ICD-10-CM

## 2025-07-02 PROCEDURE — 1123F ACP DISCUSS/DSCN MKR DOCD: CPT | Performed by: INTERNAL MEDICINE

## 2025-07-02 PROCEDURE — 99214 OFFICE O/P EST MOD 30 MIN: CPT | Performed by: INTERNAL MEDICINE

## 2025-07-02 PROCEDURE — 1159F MED LIST DOCD IN RCRD: CPT | Performed by: INTERNAL MEDICINE

## 2025-07-02 NOTE — TELEPHONE ENCOUNTER
Prep for Proc has been done. Patient is scheduled for 7/9/25 at 1pm in [place] for the EBUS/ Robotic. I called pt and had to leave a vm. Patient needs to get there by 1130am in [place].

## 2025-07-03 RX ORDER — SODIUM CHLORIDE 9 MG/ML
INJECTION, SOLUTION INTRAVENOUS PRN
Status: CANCELLED | OUTPATIENT
Start: 2025-07-03

## 2025-07-03 RX ORDER — SODIUM CHLORIDE 0.9 % (FLUSH) 0.9 %
5-40 SYRINGE (ML) INJECTION EVERY 12 HOURS SCHEDULED
Status: CANCELLED | OUTPATIENT
Start: 2025-07-03

## 2025-07-03 RX ORDER — SODIUM CHLORIDE 0.9 % (FLUSH) 0.9 %
5-40 SYRINGE (ML) INJECTION PRN
Status: CANCELLED | OUTPATIENT
Start: 2025-07-03

## 2025-07-03 NOTE — PROGRESS NOTES
Connie S Dunkel    Age 75 y.o.    female    1950    MRN [id number]    7/9/2025  Arrival Time_____________  OR Time____________90 Min     Procedure(s):  BRONCHOSCOPY ENDOBRONCHIAL ULTRASOUND FINE NEEDLE ASPIRATION ROBOTIC                      General    Surgeon(s):  Mike Castillo, Fernanda, MD       Phone [phone] (home)     [facility]  Date  Info Source  Home  Cell         Work  _____________________________________________________________________  _____________________________________________________________________  _____________________________________________________________________  _____________________________________________________________________  _____________________________________________________________________    PCP _____________________________ Phone_________________     H&P  ________________  Bringing      Chart              Epic      DOS      Called________  EKG ________________   Bringing      Chart              Epic      DOS      Called________  LABS________________   Bringing     Chart              Epic      DOS      Called________  Cardiac Clearance ______ Bringing      Chart              Epic      DOS      Called________  Pulmonary Clearance____ Bringing      Chart              Epic      DOS      Called________    Cardiologist________________________ Phone___________________________  Pulmonologist_______________________Phone___________________________      ? Blood Refusal / Waiver on Chart            [employer]________________  ? Pre-op Instructions Given /Understood          _________________________________  ? Directions to Surgery Center                          _________________________________  ? Transportation Home_______________      __________________________________  ? Crutches/Walker__________________        __________________________________    Orders: Hard copy/ EPIC                 Transcribed/ EPIC                  ________Pharmacy

## 2025-07-03 NOTE — PROGRESS NOTES
DATE AND TIME OF PROCEDURE:   07/09/2025 1:00 PM                                ARRIVAL TIME:  12:00 PM      [facility] [place] is located at [address] [place] [address]  It is the tan building to the right of the main hospital and the entrance is marked in blue letters [facility] [place]    These are general instructions. Please follow any additional instructions provided by your surgeon.    Bring Picture ID and insurance card.  Please wear simple, loose fitting clothing to the surgery center.   Do not bring valuables (money, credit cards, checkbooks, etc.)   Do not wear any makeup.  No nail polish or artificial nails.  No metal hair clips and/or richard pins. Elastic hair ties (without metal) and cloth scrunchies are OK  Do not wear any jewelry or piercings on day of procedure.  All body piercings must be removed.  If you have dentures, they will be removed before going to the procedure room; we will provide a container, if needed.   If you wear contact lenses or glasses, they will be removed; please bring a case..  If you wear hearing aids, they will be removed; we will provide a container, if needed.   If you use oxygen and have a portable tank please bring it with you. If you use a CPAP machine, bring it with you.  Shower the evening before or morning of procedure.  You may brush your teeth and gargle the morning of procedure.  DO NOT SWALLOW WATER.   Nothing to eat or drink after midnight. This includes hard candy, gum, ice chips, sips of water (except with medication as directed).  Take the following medications with a sip of water morning of procedure:  OMEPRAZOLE  Aspirin, Ibuprofen, Advil, Naproxen, Vitamin E and other Anti-inflammatory products and supplements should be stopped for 5 -7days before procedure or as directed by your physician.  Do not smoke or drink any alcoholic beverages 24 hours prior to procedure.  This includes non-alcoholic beer. Refrain from the usage of

## 2025-07-09 ENCOUNTER — APPOINTMENT (OUTPATIENT)
Dept: GENERAL RADIOLOGY | Age: 75
End: 2025-07-09
Attending: INTERNAL MEDICINE
Payer: MEDICARE

## 2025-07-09 ENCOUNTER — ANESTHESIA EVENT (OUTPATIENT)
Dept: ENDOSCOPY | Age: 75
End: 2025-07-09
Payer: MEDICARE

## 2025-07-09 ENCOUNTER — HOSPITAL ENCOUNTER (OUTPATIENT)
Age: 75
Setting detail: OUTPATIENT SURGERY
Discharge: HOME | End: 2025-07-09
Attending: INTERNAL MEDICINE | Admitting: INTERNAL MEDICINE
Payer: MEDICARE

## 2025-07-09 ENCOUNTER — ANESTHESIA (OUTPATIENT)
Dept: ENDOSCOPY | Age: 75
End: 2025-07-09
Payer: MEDICARE

## 2025-07-09 VITALS
HEART RATE: 75 BPM | RESPIRATION RATE: 16 BRPM | TEMPERATURE: 97.5 F | SYSTOLIC BLOOD PRESSURE: 127 MMHG | WEIGHT: 109 LBS | HEIGHT: 64 IN | DIASTOLIC BLOOD PRESSURE: 71 MMHG | OXYGEN SATURATION: 96 % | BODY MASS INDEX: 18.61 KG/M2

## 2025-07-09 PROCEDURE — 3609011900 HC BRONCHOSCOPY NEEDLE BX TRACHEA MAIN STEM&/BRON: Performed by: INTERNAL MEDICINE

## 2025-07-09 PROCEDURE — 6360000002 HC RX W HCPCS: Performed by: NURSE ANESTHETIST, CERTIFIED REGISTERED

## 2025-07-09 PROCEDURE — 7100000001 HC PACU RECOVERY - ADDTL 15 MIN: Performed by: INTERNAL MEDICINE

## 2025-07-09 PROCEDURE — 88177 CYTP FNA EVAL EA ADDL: CPT

## 2025-07-09 PROCEDURE — 71045 X-RAY EXAM CHEST 1 VIEW: CPT

## 2025-07-09 PROCEDURE — 2709999900 HC NON-CHARGEABLE SUPPLY: Performed by: INTERNAL MEDICINE

## 2025-07-09 PROCEDURE — 7100000000 HC PACU RECOVERY - FIRST 15 MIN: Performed by: INTERNAL MEDICINE

## 2025-07-09 PROCEDURE — 88172 CYTP DX EVAL FNA 1ST EA SITE: CPT

## 2025-07-09 PROCEDURE — 88112 CYTOPATH CELL ENHANCE TECH: CPT

## 2025-07-09 PROCEDURE — 2720000010 HC SURG SUPPLY STERILE: Performed by: INTERNAL MEDICINE

## 2025-07-09 PROCEDURE — 3700000000 HC ANESTHESIA ATTENDED CARE: Performed by: INTERNAL MEDICINE

## 2025-07-09 PROCEDURE — C1725 CATH, TRANSLUMIN NON-LASER: HCPCS | Performed by: INTERNAL MEDICINE

## 2025-07-09 PROCEDURE — 88305 TISSUE EXAM BY PATHOLOGIST: CPT

## 2025-07-09 PROCEDURE — 7100000010 HC PHASE II RECOVERY - FIRST 15 MIN: Performed by: INTERNAL MEDICINE

## 2025-07-09 PROCEDURE — 2500000003 HC RX 250 WO HCPCS: Performed by: NURSE ANESTHETIST, CERTIFIED REGISTERED

## 2025-07-09 PROCEDURE — 88342 IMHCHEM/IMCYTCHM 1ST ANTB: CPT

## 2025-07-09 PROCEDURE — 87070 CULTURE OTHR SPECIMN AEROBIC: CPT

## 2025-07-09 PROCEDURE — 87102 FUNGUS ISOLATION CULTURE: CPT

## 2025-07-09 PROCEDURE — 3700000001 HC ADD 15 MINUTES (ANESTHESIA): Performed by: INTERNAL MEDICINE

## 2025-07-09 PROCEDURE — 87116 MYCOBACTERIA CULTURE: CPT

## 2025-07-09 PROCEDURE — 88341 IMHCHEM/IMCYTCHM EA ADD ANTB: CPT

## 2025-07-09 PROCEDURE — 31624 DX BRONCHOSCOPE/LAVAGE: CPT | Performed by: INTERNAL MEDICINE

## 2025-07-09 PROCEDURE — 3609010800 HC BRONCHOSCOPY ALVEOLAR LAVAGE: Performed by: INTERNAL MEDICINE

## 2025-07-09 PROCEDURE — 87206 SMEAR FLUORESCENT/ACID STAI: CPT

## 2025-07-09 PROCEDURE — 88173 CYTOPATH EVAL FNA REPORT: CPT

## 2025-07-09 PROCEDURE — 31652 BRONCH EBUS SAMPLNG 1/2 NODE: CPT | Performed by: INTERNAL MEDICINE

## 2025-07-09 PROCEDURE — 7100000011 HC PHASE II RECOVERY - ADDTL 15 MIN: Performed by: INTERNAL MEDICINE

## 2025-07-09 PROCEDURE — 87205 SMEAR GRAM STAIN: CPT

## 2025-07-09 PROCEDURE — 2500000003 HC RX 250 WO HCPCS: Performed by: INTERNAL MEDICINE

## 2025-07-09 PROCEDURE — 88360 TUMOR IMMUNOHISTOCHEM/MANUAL: CPT

## 2025-07-09 PROCEDURE — 2580000003 HC RX 258: Performed by: ANESTHESIOLOGY

## 2025-07-09 PROCEDURE — 6370000000 HC RX 637 (ALT 250 FOR IP): Performed by: STUDENT IN AN ORGANIZED HEALTH CARE EDUCATION/TRAINING PROGRAM

## 2025-07-09 RX ORDER — LIDOCAINE HYDROCHLORIDE 20 MG/ML
INJECTION, SOLUTION INFILTRATION; PERINEURAL
Status: DISCONTINUED | OUTPATIENT
Start: 2025-07-09 | End: 2025-07-09 | Stop reason: SDUPTHER

## 2025-07-09 RX ORDER — MAGNESIUM HYDROXIDE 1200 MG/15ML
LIQUID ORAL CONTINUOUS PRN
Status: COMPLETED | OUTPATIENT
Start: 2025-07-09 | End: 2025-07-09

## 2025-07-09 RX ORDER — IPRATROPIUM BROMIDE AND ALBUTEROL SULFATE 2.5; .5 MG/3ML; MG/3ML
1 SOLUTION RESPIRATORY (INHALATION) ONCE
Status: COMPLETED | OUTPATIENT
Start: 2025-07-09 | End: 2025-07-09

## 2025-07-09 RX ORDER — OXYCODONE HYDROCHLORIDE 5 MG/1
10 TABLET ORAL PRN
Status: DISCONTINUED | OUTPATIENT
Start: 2025-07-09 | End: 2025-07-09 | Stop reason: HOSPADM

## 2025-07-09 RX ORDER — SODIUM CHLORIDE 9 MG/ML
INJECTION, SOLUTION INTRAVENOUS PRN
Status: DISCONTINUED | OUTPATIENT
Start: 2025-07-09 | End: 2025-07-09 | Stop reason: HOSPADM

## 2025-07-09 RX ORDER — SODIUM CHLORIDE 0.9 % (FLUSH) 0.9 %
5-40 SYRINGE (ML) INJECTION PRN
Status: DISCONTINUED | OUTPATIENT
Start: 2025-07-09 | End: 2025-07-09 | Stop reason: HOSPADM

## 2025-07-09 RX ORDER — GLYCOPYRROLATE 0.2 MG/ML
INJECTION INTRAMUSCULAR; INTRAVENOUS
Status: DISCONTINUED | OUTPATIENT
Start: 2025-07-09 | End: 2025-07-09 | Stop reason: SDUPTHER

## 2025-07-09 RX ORDER — DIPHENHYDRAMINE HYDROCHLORIDE 50 MG/ML
12.5 INJECTION, SOLUTION INTRAMUSCULAR; INTRAVENOUS
Status: DISCONTINUED | OUTPATIENT
Start: 2025-07-09 | End: 2025-07-09 | Stop reason: HOSPADM

## 2025-07-09 RX ORDER — SODIUM CHLORIDE 0.9 % (FLUSH) 0.9 %
5-40 SYRINGE (ML) INJECTION EVERY 12 HOURS SCHEDULED
Status: DISCONTINUED | OUTPATIENT
Start: 2025-07-09 | End: 2025-07-09 | Stop reason: HOSPADM

## 2025-07-09 RX ORDER — LIDOCAINE HYDROCHLORIDE 10 MG/ML
0.3 INJECTION, SOLUTION EPIDURAL; INFILTRATION; INTRACAUDAL; PERINEURAL
Status: DISCONTINUED | OUTPATIENT
Start: 2025-07-09 | End: 2025-07-09 | Stop reason: HOSPADM

## 2025-07-09 RX ORDER — LABETALOL HYDROCHLORIDE 5 MG/ML
10 INJECTION, SOLUTION INTRAVENOUS
Status: DISCONTINUED | OUTPATIENT
Start: 2025-07-09 | End: 2025-07-09 | Stop reason: HOSPADM

## 2025-07-09 RX ORDER — ONDANSETRON 2 MG/ML
INJECTION INTRAMUSCULAR; INTRAVENOUS
Status: DISCONTINUED | OUTPATIENT
Start: 2025-07-09 | End: 2025-07-09 | Stop reason: SDUPTHER

## 2025-07-09 RX ORDER — DEXAMETHASONE SODIUM PHOSPHATE 4 MG/ML
INJECTION, SOLUTION INTRA-ARTICULAR; INTRALESIONAL; INTRAMUSCULAR; INTRAVENOUS; SOFT TISSUE
Status: DISCONTINUED | OUTPATIENT
Start: 2025-07-09 | End: 2025-07-09 | Stop reason: SDUPTHER

## 2025-07-09 RX ORDER — DROPERIDOL 2.5 MG/ML
0.62 INJECTION, SOLUTION INTRAMUSCULAR; INTRAVENOUS
Status: DISCONTINUED | OUTPATIENT
Start: 2025-07-09 | End: 2025-07-09 | Stop reason: HOSPADM

## 2025-07-09 RX ORDER — VECURONIUM BROMIDE 1 MG/ML
INJECTION, POWDER, LYOPHILIZED, FOR SOLUTION INTRAVENOUS
Status: DISCONTINUED | OUTPATIENT
Start: 2025-07-09 | End: 2025-07-09 | Stop reason: SDUPTHER

## 2025-07-09 RX ORDER — WATER 10 ML/10ML
INJECTION INTRAMUSCULAR; INTRAVENOUS; SUBCUTANEOUS
Status: DISCONTINUED | OUTPATIENT
Start: 2025-07-09 | End: 2025-07-09 | Stop reason: SDUPTHER

## 2025-07-09 RX ORDER — SODIUM CHLORIDE, SODIUM LACTATE, POTASSIUM CHLORIDE, CALCIUM CHLORIDE 600; 310; 30; 20 MG/100ML; MG/100ML; MG/100ML; MG/100ML
INJECTION, SOLUTION INTRAVENOUS CONTINUOUS
Status: DISCONTINUED | OUTPATIENT
Start: 2025-07-09 | End: 2025-07-09 | Stop reason: HOSPADM

## 2025-07-09 RX ORDER — PROPOFOL 10 MG/ML
INJECTION, EMULSION INTRAVENOUS
Status: DISCONTINUED | OUTPATIENT
Start: 2025-07-09 | End: 2025-07-09 | Stop reason: SDUPTHER

## 2025-07-09 RX ORDER — ONDANSETRON 2 MG/ML
4 INJECTION INTRAMUSCULAR; INTRAVENOUS
Status: DISCONTINUED | OUTPATIENT
Start: 2025-07-09 | End: 2025-07-09 | Stop reason: HOSPADM

## 2025-07-09 RX ORDER — OXYCODONE HYDROCHLORIDE 5 MG/1
5 TABLET ORAL PRN
Status: DISCONTINUED | OUTPATIENT
Start: 2025-07-09 | End: 2025-07-09 | Stop reason: HOSPADM

## 2025-07-09 RX ORDER — IPRATROPIUM BROMIDE AND ALBUTEROL SULFATE 2.5; .5 MG/3ML; MG/3ML
1 SOLUTION RESPIRATORY (INHALATION)
Status: DISCONTINUED | OUTPATIENT
Start: 2025-07-09 | End: 2025-07-09 | Stop reason: HOSPADM

## 2025-07-09 RX ADMIN — SODIUM CHLORIDE, SODIUM LACTATE, POTASSIUM CHLORIDE, AND CALCIUM CHLORIDE: .6; .31; .03; .02 INJECTION, SOLUTION INTRAVENOUS at 13:24

## 2025-07-09 RX ADMIN — DEXAMETHASONE SODIUM PHOSPHATE 8 MG: 4 INJECTION, SOLUTION INTRAMUSCULAR; INTRAVENOUS at 13:50

## 2025-07-09 RX ADMIN — PROPOFOL 100 MG: 10 INJECTION, EMULSION INTRAVENOUS at 13:32

## 2025-07-09 RX ADMIN — ONDANSETRON 4 MG: 2 INJECTION, SOLUTION INTRAMUSCULAR; INTRAVENOUS at 13:50

## 2025-07-09 RX ADMIN — LIDOCAINE HYDROCHLORIDE 60 MG: 20 INJECTION, SOLUTION INFILTRATION; PERINEURAL at 13:32

## 2025-07-09 RX ADMIN — IPRATROPIUM BROMIDE AND ALBUTEROL SULFATE 1 DOSE: .5; 3 SOLUTION RESPIRATORY (INHALATION) at 13:16

## 2025-07-09 RX ADMIN — VECURONIUM BROMIDE 6 MG: 1 INJECTION, POWDER, LYOPHILIZED, FOR SOLUTION INTRAVENOUS at 13:32

## 2025-07-09 RX ADMIN — WATER 6 ML: 1 INJECTION INTRAMUSCULAR; INTRAVENOUS; SUBCUTANEOUS at 13:32

## 2025-07-09 RX ADMIN — SUGAMMADEX 200 MG: 100 INJECTION, SOLUTION INTRAVENOUS at 14:15

## 2025-07-09 RX ADMIN — GLYCOPYRROLATE 0.2 MG: 0.2 INJECTION INTRAMUSCULAR; INTRAVENOUS at 13:32

## 2025-07-09 ASSESSMENT — PAIN SCALES - GENERAL
PAINLEVEL_OUTOF10: 0

## 2025-07-09 NOTE — H&P
P  [facility]                                               Pulmonary Clinic Consult     I had the pleasure of seeing  Connie Roberts     No chief complaint on file.      HISTORY OF PRESENT ILLNESS:    Connie Roberts is a 75 y.o. year old  Who start smoking at age 11   And increase gradually 1 pack and had 60 PPY  He/She  quit smoking     The Patient comes in with mild SOB that has been going on the last few years and get worse in summer with no chest pain     Her SOB  Associated with .mild cough ,,clear sputum    She  states that it get worse with exercise or walking long distance and he can walk  1/2 and stop with leg pain and spine issues     She can do less than 1 flight of stairs before get short winded    Had breast cancer s/p surgery and radiation and estrogen and was 2020     She use no inahlers     Today visit  States her breathing is ok  States no CP  States no hemoptysis     ALLERGIES:    Allergies   Allergen Reactions    Dye [Barium-Containing Compounds] Other (See Comments)     DISORIENTED- States that this was in her 30's     Iodinated Contrast Media Dizziness or Vertigo     Other reaction(s): Dizziness    Pcn [Penicillins] Swelling     RASH       PAST MEDICAL HISTORY:       Diagnosis Date    Allergic sinusitis     ANIMAL ALLERGIES    Back pain     Breast cancer (HCC)     Cerebral artery occlusion with cerebral infarction (HCC)     2021    History of therapeutic radiation     [place] (hard of hearing)     Hyperlipidemia     Malignant neoplasm of upper-inner quadrant of right breast in female, estrogen receptor positive (HCC) 12/11/2020    Reflux     Thyroid nodule greater than or equal to 1.5 cm in diameter incidentally noted on imaging study 10/23/2024       MEDICATIONS:   Current Facility-Administered Medications   Medication Dose Route Frequency Provider Last Rate Last Admin    lidocaine PF 1 % injection 0.3 mL  0.3 mL IntraDERmal Once PRN Serge Marin,

## 2025-07-09 NOTE — OP NOTE
Operative Note      Patient: Connie Roberts  YOB: 1950  MRN: [id number]    Date of Procedure: 7/9/2025    Pre-Op Diagnosis Codes:      * Lung mass [R91.8]    Post-Op Diagnosis: Same       Procedure(s):  BRONCHOSCOPY/TRANSBRONCHIAL NEEDLE BIOPSY  BRONCHOSCOPY ALVEOLAR LAVAGE    Surgeon(s):  Fernanda Hall MD    Assistant:   * No surgical staff found *    Anesthesia: General    Estimated Blood Loss (mL): Minimal    Complications: None              ANESTHESIOLOGIST:  Per EPIC Note    SPECIMENS:  [x] Bronchial sample(s) for      Fungal smear & culture,   Acid-fast bacillus Smear and Culture,    Gram stain, C&S,    PCP               Cytology               Description of Procedure:     The patient  identified Connie Roberts  and the procedure verified as Flexible Fiberoptic Bronchoscopy with EBUS         After the patient was controlled with sedation bronchoscope was introduced through ET  without difficulty. The scope was then passed into the trachea.  Additional 2% lidocaine was used topically within the airway.  Careful inspection of the tracheal lumen was accomplished. The scope was sequentially passed into all bronchial airways.           Endobronchial findings:   ]  Trachea:  Normal mucosa   Milana  Normal mucosa     Right Main Stem Bronchus  Normal mucosa  Right Upper Lobe Bronchi Normal mucosa  Right Middle Lobe Bronchi  Normal mucosa  Right Lower Lobe Bronchi (including the Superior segment)  Normal mucosa     Left Main Stem Bronchus Normal mucosa  Left Upper Lobe Bronchus, Upper Division Normal mucosa  Left Upper Lobe Bronchus, Lingula  Normal mucosa  Left Lower Lobe Bronchus (including the Superior segment) obstructed with mass 2-3 segments with endobronchial lesion         EBUS scope  Station 7 was 5 X 6 cm  7 pass done from station 7        BAL : LLL    COMPLICATIONS:  Estimated blood loss less than 5 CC          RECOMMENDATIONS:   The Patient was in good condition ,monitor vitals   Await

## 2025-07-09 NOTE — DISCHARGE INSTRUCTIONS
sips of water, if no difficulty, may drink and eat.    2) Go home and rest for the rest of the day after procedure.    3) May return to normal activity the next day after procedure.    4) You may cough up a few specks of blood, but anything more than specks, (1) one tablespoon or greater, call your physician. If unavailable, go to the nearest emergency room.    5) Remember to report any of the following symptoms to your physician:      -  Increased difficulty in breathing      -  Chest discomfort      -  Coughing up of blood (as noted above)      -  Fever (temperature greater than 100 degrees F. or when associated with shaking, chills, or shivering)      -  Other unusual symptoms that concern you    PATIENT INSTRUCTIONS  POST-SEDATION        IMMEDIATELY FOLLOWING PROCEDURE:     1) Do not drive or operate machinery for the first twenty four hours after surgery.      2) Do not make any important decisions for twenty four hours after surgery or while taking narcotic pain medications or sedatives.       3) You should NOT BE LEFT UNATTENDED OR ALONE. A responsible adult should be with you for the rest of the day of your procedure and also during the night for your protection and safety.     4) You may experience some light headedness. Rest at home with activity as tolerated. You may not need to go to bed, but it is important to rest for the next 24 hours. You should not engage in athletic sports such as basketball, volleyball, jogging, skating, or activities requiring refined motor skills for 24 hours.     5) If you develop intractable nausea and vomiting or a severe headache please notify your doctor immediately.     6) You are not expected to have any fever, but if you feel warm, take your temperature. If you have a fever 101 degrees or higher, call your doctor.      ** Eat lightly for your first meal and gradually resume your normal / prescribed diet. DO NOT eat or drink until your gag reflex returns.    ** If you  have a sore throat you may use lozenges, or salt water gargles.        ONCE YOU ARE HOME, IF YOU SHOULD HAVE:    Difficulty in breathing, persistent nausea or vomiting, bleeding you feel is excessive, or pain that is unusual, increased abdominal bloating, or any swelling, fever / chills, call your physician. If you cannot contact your physician, but feel that your signs and symptoms need a physician's attention, go to the Emergency Department.      FOLLOW-UP:      Please follow up with Dr. Hall as scheduled.     Call Dr. Hall if there are any respiratory concerns.  [phone]    BRONCHOSCOPY:    1) Nothing to eat or drink for 2 hours after procedure. Then start with sips of water, if no difficulty, may drink and eat.    2) Go home and rest for the rest of the day after procedure.    3) May return to normal activity the next day after procedure.    4) You may cough up a few specks of blood, but anything more than specks, (1) one tablespoon or greater, call your physician. If unavailable, go to the nearest emergency room.    5) Remember to report any of the following symptoms to your physician:      -  Increased difficulty in breathing      -  Chest discomfort      -  Coughing up of blood (as noted above)      -  Fever (temperature greater than 100 degrees F. or when associated with shaking, chills, or shivering)      -  Other unusual symptoms that concern you

## 2025-07-09 NOTE — ANESTHESIA POSTPROCEDURE EVALUATION
Department of Anesthesiology  Postprocedure Note    Patient: Connie Roberts  MRN: [id number]  YOB: 1950  Date of evaluation: 7/9/2025    Procedure Summary       Date: 07/09/25 Room / Location: [place] [address] / [facility]    Anesthesia Start: 1324 Anesthesia Stop: 1426    Procedures:       BRONCHOSCOPY/TRANSBRONCHIAL NEEDLE BIOPSY      BRONCHOSCOPY ALVEOLAR LAVAGE Diagnosis:       Lung mass      (Lung mass [R91.8])    Surgeons: Fernanda Hall MD Responsible Provider: Rodrigo Smith MD    Anesthesia Type: general ASA Status: 3            Anesthesia Type: No value filed.    Mehran Phase I: Mehran Score: 9    Mehran Phase II:      Anesthesia Post Evaluation    Comments: Anes Post-op Note    Name:    Connie Roberts  MRN:      [id number]    Patient Vitals in the past 12 hrs:  07/09/25 1445, BP:120/67, Pulse:88, Resp:13  07/09/25 1435, BP:124/82, Pulse:100, Resp:18  07/09/25 1430, BP:(!) 115/100, Pulse:(!) 103, Resp:18, SpO2:97 %  07/09/25 1427, BP:133/83, Temp:97.1 °F (36.2 °C), SpO2:100 %  07/09/25 1245, BP:(!) 135/100, Temp:98.2 °F (36.8 °C), Temp src:Oral, Pulse:81, Resp:18, SpO2:99 %     LABS:    CBC  Lab Results       Component                Value               Date/Time                  WBC                      8.6                 06/16/2025 05:26 PM        HGB                      13.5                06/16/2025 05:26 PM        HCT                      39.1                06/16/2025 05:26 PM        PLT                      232                 06/16/2025 05:26 PM   RENAL  Lab Results       Component                Value               Date/Time                  NA                       139                 06/16/2025 05:26 PM        K                        3.4 (L)             06/16/2025 05:26 PM        CL                       103                 06/16/2025 05:26 PM        CO2                      24                  06/16/2025 05:26 PM        BUN                      9

## 2025-07-09 NOTE — ANESTHESIA PRE PROCEDURE
PM       POC Tests: No results for input(s): \"POCGLU\", \"POCNA\", \"POCK\", \"POCCL\", \"POCBUN\", \"POCHEMO\", \"POCHCT\" in the last 72 hours.    Coags: No results found for: \"PROTIME\", \"INR\", \"APTT\"    HCG (If Applicable): No results found for: \"PREGTESTUR\", \"PREGSERUM\", \"HCG\", \"HCGQUANT\"     ABGs: No results found for: \"PHART\", \"PO2ART\", \"ZQQ3SMZ\", \"GVL5FBZ\", \"BEART\", \"B1YORYLL\"     Type & Screen (If Applicable):  No results found for: \"ABORH\", \"LABANTI\"    Drug/Infectious Status (If Applicable):  No results found for: \"HIV\", \"HEPCAB\"    COVID-19 Screening (If Applicable):   Lab Results   Component Value Date/Time    COVID19 NOT DETECTED 12/31/2020 09:11 AM           Anesthesia Evaluation  Patient summary reviewed and Nursing notes reviewed   no history of anesthetic complications:   Airway: Mallampati: I  TM distance: >3 FB   Neck ROM: full  Mouth opening: > = 3 FB   Dental: normal exam     Comment: Pt aware of risk to dentition during airway manipulation and during anesthesia/emergence.  They understand the risks and wishes to proceed.    Pulmonary:Negative Pulmonary ROS and normal exam                              ROS comment: Lung mass   Cardiovascular:Negative CV ROS  Exercise tolerance: poor (<4 METS)  (+) hyperlipidemia            Echocardiogram reviewed                  Neuro/Psych:   Negative Neuro/Psych ROS  (+) CVA (2021):, TIA            GI/Hepatic/Renal: Neg GI/Hepatic/Renal ROS  (+) GERD: well controlled     (-) hiatal hernia       Endo/Other: Negative Endo/Other ROS   (+) malignancy/cancer (h/o breast cancer, s/p mastectomy).                  ROS comment: Hard of hearing Abdominal:             Vascular: negative vascular ROS.         Other Findings:        TTE 8/13/24    Image quality is adequate. Technically difficult study due to low parasternal window and technically difficult study due to patient's heart rhythm.    Left Ventricle: Mild-moderately reduced left ventricular systolic function with a visually

## 2025-07-10 LAB — LOEFFLER MB STN SPEC: NORMAL

## 2025-07-11 LAB
ACID FAST STN SPEC QL: NORMAL
BACTERIA SPEC RESP CULT: NORMAL
GRAM STN SPEC: NORMAL

## 2025-07-11 NOTE — TELEPHONE ENCOUNTER
Patient called wanting biopsy results if they are in from 7/9/25.    If they are not ready, please call patient and let her know because she is very anxious and worried.    Please call patient on her cell phone because she will be out for a few hours today. [phone].

## 2025-07-15 ENCOUNTER — TELEPHONE (OUTPATIENT)
Dept: PULMONOLOGY | Age: 75
End: 2025-07-15

## 2025-07-15 ENCOUNTER — RESULTS FOLLOW-UP (OUTPATIENT)
Dept: PULMONOLOGY | Age: 75
End: 2025-07-15

## 2025-07-15 DIAGNOSIS — C34.90 SMALL CELL CARCINOMA OF LUNG, UNSPECIFIED LATERALITY, UNSPECIFIED PART OF LUNG (HCC): Primary | ICD-10-CM

## 2025-07-15 NOTE — TELEPHONE ENCOUNTER
Had a biopsy done on 7/9. States my chart says it's cancer. Has not heard from Dr. Hall. She needs a call from Dr. Hall to explain what the results say. Needs to know what she is supposed to do from here.   Please call her at [phone]

## 2025-07-15 NOTE — TELEPHONE ENCOUNTER
Fernanda Hall MD  You52 minutes ago (3:27 PM)       I called her  Referral to oncology       Referral has been faxed

## 2025-07-15 NOTE — TELEPHONE ENCOUNTER
I called pt and could not leave a VM.  I called other number on file and left VM      Please review results on Biopsies

## 2025-07-15 NOTE — TELEPHONE ENCOUNTER
Per telephone encounter from today Dr. Monreal called pt and placed referral to [facility] and this has been faxed.

## 2025-07-21 LAB
FUNGUS SPEC CULT: NORMAL
LOEFFLER MB STN SPEC: NORMAL

## 2025-07-22 LAB
ACID FAST STN SPEC QL: NORMAL
MYCOBACTERIUM SPEC CULT: NORMAL

## 2025-07-25 ENCOUNTER — TELEPHONE (OUTPATIENT)
Dept: SURGERY | Age: 75
End: 2025-07-25

## 2025-07-25 ENCOUNTER — TRANSCRIBE ORDERS (OUTPATIENT)
Dept: ADMINISTRATIVE | Age: 75
End: 2025-07-25

## 2025-07-25 DIAGNOSIS — C50.111 MALIGNANT NEOPLASM OF CENTRAL PORTION OF RIGHT FEMALE BREAST, UNSPECIFIED ESTROGEN RECEPTOR STATUS (HCC): Primary | ICD-10-CM

## 2025-07-25 NOTE — PROGRESS NOTES
Surgery Date and Time: 8/1/25 @ 09:30 am    Arrival Time:  07:30 am    The instructions given when a patient needs to stop oral intake prior to surgery varies. Follow the instructions you   were given by your Surgeon or RN during the Pre-op call.     X Do not eat or drink anything after Midnight the night before the surgery.     NO gum, mints, candy, or ice chips the day of surgery.      Only take the following medications with a small sip of water the morning    of surgery:  omeprazole, norco, if needed     Aspirin, Ibuprofen, Advil, Naproxen, Vitamin E and other Anti-inflammatory products and    supplements should be stopped for 5-7days before surgery or as directed by your physician/surgeon.      - Do not smoke or vape, and do not drink any alcoholic beverages 24 hours prior to surgery,       this includes NA Beer. Refrain from using any recreational drugs, including non-prescribed prescription drugs.     -You may brush your teeth and gargle the morning of surgery. Do Not Swallow any Water.    -You MUST plan for a responsible adult to stay on site while you are here and take you home after your surgery.    You will not be allowed to leave alone or drive yourself home. It is requested someone stay with you the first    24 hours or your surgery will be cancelled if you do not have a ride home with a responsible adult.  -A parent/legal guardian must accompany a child scheduled for surgery and plan to stay at the hospital   until the child is discharged. Please do not bring other children with you.  -Please wear simple, loose-fitting clothing to the hospital. Do not bring valuables (money, credit cards,   checkbooks, etc.)   -Do Not wear any makeup (including no eye makeup) and no nail polish if applicable or requested to remove.  -Do Not wear any jewelry or body piercings day of surgery.  All body piercings must be removed.  - If you have dentures they will be removed before going to the OR; we will provide a

## 2025-07-25 NOTE — PROGRESS NOTES
Connie S Dunkel    Age 75 y.o.    female    1950    MRN [id number]    8/1/2025  Arrival Time_____________  OR Time____________72 Min     Procedure(s):  SURGICAL PORT PLACEMENT                      Monitor Anesthesia Care   Surgeon(s):  Young, Carlos Lares, MD      DAY ADMIT ___  SDS/OP ___  OUTPT IN BED ___        Phone [phone] (home)                  PCP _____________________ Phone_________________ Epic ( ) Epic CE ( ) Appt ________    NOTES: _________________________________________ Consult/Cardio _______________    ____________________________________________________________________________    ____________________________________________________________________________  PAT APPT DATE:________ TIME: ________  FAXED QAD: _______  (__) H&P w/ Hospitalist    (__) PAT orders in EPIC    (__) Meet with PAT nurse  __________________________________________________________________________  Preop Nurse phone screen complete: _____________  (__) CBC     (__) W/ DIFF ___________  (__) CT CHEST  __________   (__) Hgb A1C    ___________  (__) CHEST X RAY   __________  (__) LIPID PROFILE  ___________  (__) EKG   __________  (__) PT-INR / APTT  ___________  (__) PFT's   __________  (__) BMP   ___________  (__) CAROTIDS  __________  (__) CMP   ___________  (__) VEIN MAPPING  __________  (__) U/A   ___________  ( X ) HISTORY & PHYSICAL __________  (__) URINE C & S  ___________  (__) CARDIAC CLEARANCE __________  (__) U/A W/ FLEX  ___________  (__) PULM. CLEARANCE __________  (__) SERUM PREGNANCY ___________  (__) Preop Orders in EPIC __________  (__) TYPE & SCREEN __________repeat ( ) (__)  __________________ __________  (__) Albumin   ___________  (__)  __________________ __________  (__) TRANSFERRIN  ___________  (__)  __________________ __________  (__) LIVER PROFILE  ___________  (__) URINE PREG DOS __________  (__) MRSA NASAL SWAB ___________  (__) BLOOD SUGAR DOS __________  (__) SED RATE  ___________  (__) OAC

## 2025-07-28 LAB
FUNGUS SPEC CULT: NORMAL
LOEFFLER MB STN SPEC: NORMAL

## 2025-07-29 ENCOUNTER — TELEPHONE (OUTPATIENT)
Dept: SURGERY | Age: 75
End: 2025-07-29

## 2025-07-29 LAB
ACID FAST STN SPEC QL: NORMAL
MYCOBACTERIUM SPEC CULT: NORMAL

## 2025-07-29 NOTE — TELEPHONE ENCOUNTER
Called pt per PAT request to inform Dr Young places Ports on the right side but that if she can't do the right side, he will talk to her on Friday 8/1 (DOS)     No answer - Did not leave VM - Rooming patients

## 2025-07-29 NOTE — PROGRESS NOTES
Pt called, has questions which side port will be placed. Question referred to surgeon's office to call pt about her concerns.

## 2025-07-31 ENCOUNTER — ANESTHESIA EVENT (OUTPATIENT)
Dept: OPERATING ROOM | Age: 75
End: 2025-07-31
Payer: MEDICARE

## 2025-08-01 ENCOUNTER — ANESTHESIA (OUTPATIENT)
Dept: OPERATING ROOM | Age: 75
End: 2025-08-01
Payer: MEDICARE

## 2025-08-01 ENCOUNTER — HOSPITAL ENCOUNTER (OUTPATIENT)
Age: 75
Setting detail: OUTPATIENT SURGERY
Discharge: HOME | End: 2025-08-01
Attending: SURGERY | Admitting: SURGERY
Payer: MEDICARE

## 2025-08-01 ENCOUNTER — APPOINTMENT (OUTPATIENT)
Dept: GENERAL RADIOLOGY | Age: 75
End: 2025-08-01
Attending: SURGERY
Payer: MEDICARE

## 2025-08-01 VITALS
WEIGHT: 107 LBS | TEMPERATURE: 97.5 F | HEIGHT: 63 IN | HEART RATE: 64 BPM | RESPIRATION RATE: 18 BRPM | DIASTOLIC BLOOD PRESSURE: 79 MMHG | SYSTOLIC BLOOD PRESSURE: 123 MMHG | BODY MASS INDEX: 18.96 KG/M2 | OXYGEN SATURATION: 97 %

## 2025-08-01 PROCEDURE — 3700000001 HC ADD 15 MINUTES (ANESTHESIA): Performed by: SURGERY

## 2025-08-01 PROCEDURE — 2709999900 HC NON-CHARGEABLE SUPPLY: Performed by: SURGERY

## 2025-08-01 PROCEDURE — 2500000003 HC RX 250 WO HCPCS: Performed by: SURGERY

## 2025-08-01 PROCEDURE — C1788 PORT, INDWELLING, IMP: HCPCS | Performed by: SURGERY

## 2025-08-01 PROCEDURE — 6360000002 HC RX W HCPCS: Performed by: NURSE ANESTHETIST, CERTIFIED REGISTERED

## 2025-08-01 PROCEDURE — 6360000002 HC RX W HCPCS: Performed by: SURGERY

## 2025-08-01 PROCEDURE — 3600000012 HC SURGERY LEVEL 2 ADDTL 15MIN: Performed by: SURGERY

## 2025-08-01 PROCEDURE — 7100000010 HC PHASE II RECOVERY - FIRST 15 MIN: Performed by: SURGERY

## 2025-08-01 PROCEDURE — 77001 FLUOROGUIDE FOR VEIN DEVICE: CPT | Performed by: SURGERY

## 2025-08-01 PROCEDURE — 36561 INSERT TUNNELED CV CATH: CPT | Performed by: SURGERY

## 2025-08-01 PROCEDURE — 7100000011 HC PHASE II RECOVERY - ADDTL 15 MIN: Performed by: SURGERY

## 2025-08-01 PROCEDURE — 3700000000 HC ANESTHESIA ATTENDED CARE: Performed by: SURGERY

## 2025-08-01 PROCEDURE — 71046 X-RAY EXAM CHEST 2 VIEWS: CPT

## 2025-08-01 PROCEDURE — 76937 US GUIDE VASCULAR ACCESS: CPT | Performed by: SURGERY

## 2025-08-01 PROCEDURE — 3600000002 HC SURGERY LEVEL 2 BASE: Performed by: SURGERY

## 2025-08-01 PROCEDURE — 2580000003 HC RX 258: Performed by: NURSE ANESTHETIST, CERTIFIED REGISTERED

## 2025-08-01 DEVICE — PORT INFUS SGL LUMN ATTCH POLYUR OPN END CATH 8FR POWERPRT: Type: IMPLANTABLE DEVICE | Site: CHEST  WALL | Status: FUNCTIONAL

## 2025-08-01 RX ORDER — LIDOCAINE HYDROCHLORIDE 20 MG/ML
INJECTION, SOLUTION INFILTRATION; PERINEURAL
Status: DISCONTINUED | OUTPATIENT
Start: 2025-08-01 | End: 2025-08-01 | Stop reason: SDUPTHER

## 2025-08-01 RX ORDER — SODIUM CHLORIDE, SODIUM LACTATE, POTASSIUM CHLORIDE, CALCIUM CHLORIDE 600; 310; 30; 20 MG/100ML; MG/100ML; MG/100ML; MG/100ML
INJECTION, SOLUTION INTRAVENOUS CONTINUOUS
Status: DISCONTINUED | OUTPATIENT
Start: 2025-08-01 | End: 2025-08-01 | Stop reason: HOSPADM

## 2025-08-01 RX ORDER — SODIUM CHLORIDE 9 MG/ML
INJECTION, SOLUTION INTRAVENOUS PRN
Status: CANCELLED | OUTPATIENT
Start: 2025-08-01

## 2025-08-01 RX ORDER — LIDOCAINE HYDROCHLORIDE 10 MG/ML
1 INJECTION, SOLUTION EPIDURAL; INFILTRATION; INTRACAUDAL; PERINEURAL
Status: DISCONTINUED | OUTPATIENT
Start: 2025-08-01 | End: 2025-08-01 | Stop reason: HOSPADM

## 2025-08-01 RX ORDER — SODIUM CHLORIDE 0.9 % (FLUSH) 0.9 %
5-40 SYRINGE (ML) INJECTION PRN
Status: DISCONTINUED | OUTPATIENT
Start: 2025-08-01 | End: 2025-08-01 | Stop reason: HOSPADM

## 2025-08-01 RX ORDER — MIDAZOLAM HYDROCHLORIDE 1 MG/ML
INJECTION, SOLUTION INTRAMUSCULAR; INTRAVENOUS
Status: DISCONTINUED | OUTPATIENT
Start: 2025-08-01 | End: 2025-08-01 | Stop reason: SDUPTHER

## 2025-08-01 RX ORDER — SODIUM CHLORIDE 0.9 % (FLUSH) 0.9 %
5-40 SYRINGE (ML) INJECTION EVERY 12 HOURS SCHEDULED
Status: DISCONTINUED | OUTPATIENT
Start: 2025-08-01 | End: 2025-08-01 | Stop reason: HOSPADM

## 2025-08-01 RX ORDER — OXYCODONE HYDROCHLORIDE 5 MG/1
5 TABLET ORAL
Refills: 0 | Status: CANCELLED | OUTPATIENT
Start: 2025-08-01

## 2025-08-01 RX ORDER — SODIUM CHLORIDE 0.9 % (FLUSH) 0.9 %
5-40 SYRINGE (ML) INJECTION EVERY 12 HOURS SCHEDULED
Status: CANCELLED | OUTPATIENT
Start: 2025-08-01

## 2025-08-01 RX ORDER — SODIUM CHLORIDE 0.9 % (FLUSH) 0.9 %
5-40 SYRINGE (ML) INJECTION PRN
Status: CANCELLED | OUTPATIENT
Start: 2025-08-01

## 2025-08-01 RX ORDER — MIDAZOLAM HYDROCHLORIDE 1 MG/ML
2 INJECTION, SOLUTION INTRAMUSCULAR; INTRAVENOUS
Status: CANCELLED | OUTPATIENT
Start: 2025-08-01

## 2025-08-01 RX ORDER — FENTANYL CITRATE 50 UG/ML
INJECTION, SOLUTION INTRAMUSCULAR; INTRAVENOUS
Status: DISCONTINUED | OUTPATIENT
Start: 2025-08-01 | End: 2025-08-01 | Stop reason: SDUPTHER

## 2025-08-01 RX ORDER — PROPOFOL 10 MG/ML
INJECTION, EMULSION INTRAVENOUS
Status: DISCONTINUED | OUTPATIENT
Start: 2025-08-01 | End: 2025-08-01 | Stop reason: SDUPTHER

## 2025-08-01 RX ORDER — MIDAZOLAM HYDROCHLORIDE 1 MG/ML
2 INJECTION, SOLUTION INTRAMUSCULAR; INTRAVENOUS
Status: DISCONTINUED | OUTPATIENT
Start: 2025-08-01 | End: 2025-08-01 | Stop reason: HOSPADM

## 2025-08-01 RX ORDER — SODIUM CHLORIDE 9 MG/ML
INJECTION, SOLUTION INTRAVENOUS
Status: DISCONTINUED | OUTPATIENT
Start: 2025-08-01 | End: 2025-08-01 | Stop reason: SDUPTHER

## 2025-08-01 RX ORDER — SODIUM CHLORIDE 9 MG/ML
INJECTION, SOLUTION INTRAVENOUS PRN
Status: DISCONTINUED | OUTPATIENT
Start: 2025-08-01 | End: 2025-08-01 | Stop reason: HOSPADM

## 2025-08-01 RX ORDER — FENTANYL CITRATE 50 UG/ML
50 INJECTION, SOLUTION INTRAMUSCULAR; INTRAVENOUS EVERY 5 MIN PRN
Refills: 0 | Status: CANCELLED | OUTPATIENT
Start: 2025-08-01

## 2025-08-01 RX ORDER — CEFAZOLIN SODIUM 1 G/3ML
INJECTION, POWDER, FOR SOLUTION INTRAMUSCULAR; INTRAVENOUS
Status: DISCONTINUED | OUTPATIENT
Start: 2025-08-01 | End: 2025-08-01 | Stop reason: SDUPTHER

## 2025-08-01 RX ORDER — METOCLOPRAMIDE HYDROCHLORIDE 5 MG/ML
10 INJECTION INTRAMUSCULAR; INTRAVENOUS
Status: CANCELLED | OUTPATIENT
Start: 2025-08-01

## 2025-08-01 RX ORDER — LABETALOL HYDROCHLORIDE 5 MG/ML
10 INJECTION, SOLUTION INTRAVENOUS
Status: CANCELLED | OUTPATIENT
Start: 2025-08-01

## 2025-08-01 RX ORDER — ONDANSETRON 2 MG/ML
4 INJECTION INTRAMUSCULAR; INTRAVENOUS
Status: CANCELLED | OUTPATIENT
Start: 2025-08-01

## 2025-08-01 RX ADMIN — CEFAZOLIN 2 G: 1 INJECTION, POWDER, FOR SOLUTION INTRAMUSCULAR; INTRAVENOUS at 11:40

## 2025-08-01 RX ADMIN — LIDOCAINE HYDROCHLORIDE 80 MG: 20 INJECTION, SOLUTION INFILTRATION; PERINEURAL at 11:41

## 2025-08-01 RX ADMIN — MIDAZOLAM 2 MG: 1 INJECTION INTRAMUSCULAR; INTRAVENOUS at 11:41

## 2025-08-01 RX ADMIN — PROPOFOL 100 MCG/KG/MIN: 10 INJECTION, EMULSION INTRAVENOUS at 11:41

## 2025-08-01 RX ADMIN — FENTANYL CITRATE 50 MCG: 50 INJECTION, SOLUTION INTRAMUSCULAR; INTRAVENOUS at 11:41

## 2025-08-01 RX ADMIN — SODIUM CHLORIDE: 9 INJECTION, SOLUTION INTRAVENOUS at 11:36

## 2025-08-01 ASSESSMENT — PAIN DESCRIPTION - DESCRIPTORS
DESCRIPTORS: TIGHTNESS
DESCRIPTORS: TIGHTNESS

## 2025-08-01 ASSESSMENT — PAIN - FUNCTIONAL ASSESSMENT
PAIN_FUNCTIONAL_ASSESSMENT: 0-10
PAIN_FUNCTIONAL_ASSESSMENT: ACTIVITIES ARE NOT PREVENTED

## 2025-08-01 ASSESSMENT — PAIN DESCRIPTION - LOCATION: LOCATION: RIB CAGE

## 2025-08-01 ASSESSMENT — LIFESTYLE VARIABLES: SMOKING_STATUS: 1

## 2025-08-01 ASSESSMENT — PAIN DESCRIPTION - ORIENTATION: ORIENTATION: LEFT

## 2025-08-01 ASSESSMENT — PAIN SCALES - GENERAL: PAINLEVEL_OUTOF10: 8

## 2025-08-01 NOTE — ANESTHESIA PRE PROCEDURE
Status:                                                                                 BMI:   Wt Readings from Last 3 Encounters:   07/25/25 48.5 kg (107 lb)   07/03/25 49.4 kg (109 lb)   07/02/25 49.8 kg (109 lb 12.8 oz)     Body mass index is 18.95 kg/m².    CBC:   Lab Results   Component Value Date/Time    WBC 8.6 06/16/2025 05:26 PM    RBC 4.43 06/16/2025 05:26 PM    HGB 13.5 06/16/2025 05:26 PM    HCT 39.1 06/16/2025 05:26 PM    MCV 88.2 06/16/2025 05:26 PM    RDW 13.5 06/16/2025 05:26 PM     06/16/2025 05:26 PM       CMP:   Lab Results   Component Value Date/Time     06/16/2025 05:26 PM    K 3.4 06/16/2025 05:26 PM     06/16/2025 05:26 PM    CO2 24 06/16/2025 05:26 PM    BUN 9 06/16/2025 05:26 PM    CREATININE 0.6 06/16/2025 05:26 PM    GFRAA >60 10/03/2022 10:49 AM    AGRATIO 1.0 06/16/2025 05:26 PM    LABGLOM >90 06/16/2025 05:26 PM    GLUCOSE 99 06/16/2025 05:26 PM    CALCIUM 9.6 06/16/2025 05:26 PM    BILITOT <0.2 06/16/2025 05:26 PM    ALKPHOS 95 06/16/2025 05:26 PM    AST 24 06/16/2025 05:26 PM    ALT 11 06/16/2025 05:26 PM       POC Tests: No results for input(s): \"POCGLU\", \"POCNA\", \"POCK\", \"POCCL\", \"POCBUN\", \"POCHEMO\", \"POCHCT\" in the last 72 hours.    Coags: No results found for: \"PROTIME\", \"INR\", \"APTT\"    HCG (If Applicable): No results found for: \"PREGTESTUR\", \"PREGSERUM\", \"HCG\", \"HCGQUANT\"     ABGs: No results found for: \"PHART\", \"PO2ART\", \"LVN8BYA\", \"DJZ7HUF\", \"BEART\", \"X5ULSDJC\"     Type & Screen (If Applicable):  No results found for: \"ABORH\", \"LABANTI\"    Drug/Infectious Status (If Applicable):  No results found for: \"HIV\", \"HEPCAB\"    COVID-19 Screening (If Applicable):   Lab Results   Component Value Date/Time    COVID19 NOT DETECTED 12/31/2020 09:11 AM           Anesthesia Evaluation  Patient summary reviewed and Nursing notes reviewed   no history of anesthetic complications:   Airway: Mallampati: I  TM distance: >3 FB   Neck ROM: full  Mouth opening: > = 3 FB   Dental:

## 2025-08-01 NOTE — DISCHARGE INSTRUCTIONS
[facility]    Carlos Young M.D.   [facility] Office      [facility]          [facility]               [address]                [address]  Lex Pisano M.D.              [address]           [address]            [place] [address]         [place] [address]  Braden Hernandez M.D                         [phone]          [facility]                   Bandar Cosme M.D.            [facility]      POST-OPERATIVE INSTRUCTIONS FOLLOWING MEDIPORT INSERTION    Call the office to if you have questions or concerns.     You will have surgical glue closing your incisions.         You may shower.  Wash incision gently, and pat dry. Do not rub your incisions.    Do NOT drive for one week and while taking your narcotic pain medicine.    You will have pain medicine ordered. Take as directed.     Watch for signs of infection:    Fever over 100.5°     Excessive warmth or bright redness around your incisions   Leakage of bloody or cloudy fluid from you incisions  ANESTHESIA DISCHARGE INSTRUCTIONS    You are under the influence of drugs- do not drink alcohol, drive a car, operate machinery(such as power tools, kitchen appliances, etc), sign legal documents, or make any important decisions for 24 hours (or while on pain medications).   Children should not ride bikes or skate boards or play on gym sets  for 24 hours after surgery.  A responsible adult should be with you for 24 hours.  Rest at home today- increase activity as tolerated.  Progress slowly to a regular diet unless your physician has instructed you otherwise. Drink plenty of water.    CALL YOUR DOCTOR IF YOU:  Have moderate to severe nausea or vomiting AND are unable to hold down fluids or prescribed medications.  Have bright red bloody drainage from your dressing that won't stop oozing.  Do not get relief with your pain

## 2025-08-01 NOTE — ANESTHESIA POSTPROCEDURE EVALUATION
Department of Anesthesiology  Postprocedure Note    Patient: Connie Roberts  MRN: [id number]  YOB: 1950  Date of evaluation: 8/1/2025    Procedure Summary       Date: 08/01/25 Room / Location: [address]    Anesthesia Start: 1136 Anesthesia Stop: 1215    Procedure: SURGICAL PORT PLACEMENT (Chest) Diagnosis:       Malignant neoplasm of central portion of right female breast (HCC)      (Malignant neoplasm of central portion of right female breast (HCC) [C50.111])    Surgeons: Carlos Young MD Responsible Provider: Adi Venegas MD    Anesthesia Type: MAC ASA Status: 3            Anesthesia Type: No value filed.    Mehran Phase I: Mehran Score: 10    Mehran Phase II: Mehran Score: 9    Anesthesia Post Evaluation    Patient location during evaluation: PACU  Level of consciousness: awake  Airway patency: patent  Nausea & Vomiting: no vomiting  Cardiovascular status: blood pressure returned to baseline  Respiratory status: acceptable  Hydration status: stable  Pain management: adequate    No notable events documented.

## 2025-08-01 NOTE — OP NOTE
Operative Note      Patient: Cnonie Roberts  YOB: 1950  MRN: [id number]    Date of Procedure: 8/1/2025    Pre-Op Diagnosis Codes:      * Malignant neoplasm of central portion of right female breast (HCC) [C50.111]    Post-Op Diagnosis: Same       Procedure(s):  SURGICAL PORT PLACEMENT    Surgeon(s):  Carlos Young MD    Assistant:   Surgical Assistant: Sheree Miller    Anesthesia: Monitor Anesthesia Care    Estimated Blood Loss (mL): less than 50     Complications: None    Specimens:   * No specimens in log *    Implants:  Implant Name Type Inv. Item Serial No.  Lot No. LRB No. Used Action   PORT INFUS SGL LUMN ATTCH POLYUR OPN END CATH 8FR POWERPRT - [id number]  PORT INFUS SGL LUMN ATTCH POLYUR OPN END CATH 8FR POWERPRT  [employer]WD [id number] 1 Implanted         Drains: * No LDAs found *    Findings:  Present At Time Of Surgery (PATOS) (choose all levels that have infection present):  No infection present  Other Findings: R IJ portacath insertion, tip in distal SVC    Detailed Description of Procedure:         PROCEDURE IN DETAIL: The patient was taken to the operative suite and was placed on the table in a supine position, the arms pulled downward, and the head turned to the left. The neck, chest, and shoulders were prepped with Betadine and draped in a sterile fashion. Using the portable ultrasound, the right internal jugular vein was readily identified.  Local anesthesia was infiltrated over the vein.  The jugular vein was then accessed without difficulty under ultrasound guidance.  The guidewire passed readily into vein.  Fluoroscopy confirmed the wire to be passing down the vena cava past the heart.     A 3cm incision was now made on the chest wall below the junction of the middle and lateral thirds of the clavicle after infiltrating with local anesthesia.  Dissection was carried down to the pectoral fascia and a pocket created along the fascia.

## 2025-08-01 NOTE — FLOWSHEET NOTE
08/01/25 1216   Vital Signs   Temp 97.5 °F (36.4 °C)   Temp Source Oral   Pulse 69   Heart Rate Source Monitor   Respirations 18   /65   MAP (Calculated) 79   Pain Assessment   Pain Assessment 0-10   Pain Level 8   Pain Location Rib cage   Pain Orientation Left   Pain Descriptors Tightness   Oxygen Therapy   SpO2 98 %   Pulse Oximeter Device Mode Continuous   O2 Device Nasal cannula   O2 Flow Rate (L/min) 3 L/min     Returned to SDS in phase 2, Right chest C/D/I with surgical glue.

## 2025-08-01 NOTE — H&P
I have reviewed the H&P (in Media tab) and examined this patient. I have reviewed with the patient and /or family the risks, benefits and alternatives to this procedure and they seem to understand and agree to proceed.  DON TAVERAS MD

## 2025-08-04 LAB
FUNGUS SPEC CULT: NORMAL
LOEFFLER MB STN SPEC: NORMAL

## 2025-08-05 LAB
ACID FAST STN SPEC QL: NORMAL
MYCOBACTERIUM SPEC CULT: NORMAL

## 2025-08-08 ENCOUNTER — HOSPITAL ENCOUNTER (OUTPATIENT)
Dept: CT IMAGING | Age: 75
Discharge: HOME | End: 2025-08-08
Payer: MEDICARE

## 2025-08-08 VITALS
HEIGHT: 64 IN | SYSTOLIC BLOOD PRESSURE: 151 MMHG | DIASTOLIC BLOOD PRESSURE: 67 MMHG | RESPIRATION RATE: 18 BRPM | OXYGEN SATURATION: 95 % | WEIGHT: 105 LBS | TEMPERATURE: 97.5 F | HEART RATE: 69 BPM | BODY MASS INDEX: 17.93 KG/M2

## 2025-08-08 DIAGNOSIS — C34.90 SMALL CELL LUNG CANCER IN ADULT (HCC): ICD-10-CM

## 2025-08-08 DIAGNOSIS — Z85.3 HISTORY OF BREAST CANCER IN FEMALE: ICD-10-CM

## 2025-08-08 LAB
BASOPHILS # BLD: 0 K/UL (ref 0–0.2)
BASOPHILS NFR BLD: 0.2 %
DEPRECATED RDW RBC AUTO: 14.4 % (ref 12.4–15.4)
EOSINOPHIL # BLD: 0 K/UL (ref 0–0.6)
EOSINOPHIL NFR BLD: 0.2 %
HCT VFR BLD AUTO: 33.7 % (ref 36–48)
HGB BLD-MCNC: 11.3 G/DL (ref 12–16)
INR PPP: 1.04 (ref 0.86–1.14)
LYMPHOCYTES # BLD: 1.1 K/UL (ref 1–5.1)
LYMPHOCYTES NFR BLD: 28.2 %
MCH RBC QN AUTO: 29.5 PG (ref 26–34)
MCHC RBC AUTO-ENTMCNC: 33.6 G/DL (ref 31–36)
MCV RBC AUTO: 87.6 FL (ref 80–100)
MONOCYTES # BLD: 0 K/UL (ref 0–1.3)
MONOCYTES NFR BLD: 0.3 %
NEUTROPHILS # BLD: 2.6 K/UL (ref 1.7–7.7)
NEUTROPHILS NFR BLD: 71.1 %
PLATELET # BLD AUTO: 141 K/UL (ref 135–450)
PMV BLD AUTO: 8.3 FL (ref 5–10.5)
PROTHROMBIN TIME: 13.9 SEC (ref 12.1–14.9)
RBC # BLD AUTO: 3.85 M/UL (ref 4–5.2)
WBC # BLD AUTO: 3.7 K/UL (ref 4–11)

## 2025-08-08 PROCEDURE — 85025 COMPLETE CBC W/AUTO DIFF WBC: CPT

## 2025-08-08 PROCEDURE — 2709999900 CT BIOPSY BONE MARROW

## 2025-08-08 PROCEDURE — 85610 PROTHROMBIN TIME: CPT

## 2025-08-08 PROCEDURE — 6360000002 HC RX W HCPCS: Performed by: RADIOLOGY

## 2025-08-08 PROCEDURE — 36415 COLL VENOUS BLD VENIPUNCTURE: CPT

## 2025-08-08 RX ORDER — SODIUM CHLORIDE 9 MG/ML
INJECTION, SOLUTION INTRAVENOUS PRN
Status: DISCONTINUED | OUTPATIENT
Start: 2025-08-08 | End: 2025-08-09 | Stop reason: HOSPADM

## 2025-08-08 RX ORDER — ACETAMINOPHEN 325 MG/1
650 TABLET ORAL EVERY 4 HOURS PRN
Status: DISCONTINUED | OUTPATIENT
Start: 2025-08-08 | End: 2025-08-09 | Stop reason: HOSPADM

## 2025-08-08 RX ORDER — SODIUM CHLORIDE 0.9 % (FLUSH) 0.9 %
5-40 SYRINGE (ML) INJECTION EVERY 12 HOURS SCHEDULED
Status: DISCONTINUED | OUTPATIENT
Start: 2025-08-08 | End: 2025-08-09 | Stop reason: HOSPADM

## 2025-08-08 RX ORDER — SODIUM CHLORIDE 0.9 % (FLUSH) 0.9 %
5-40 SYRINGE (ML) INJECTION PRN
Status: DISCONTINUED | OUTPATIENT
Start: 2025-08-08 | End: 2025-08-09 | Stop reason: HOSPADM

## 2025-08-08 RX ORDER — FENTANYL CITRATE 50 UG/ML
INJECTION, SOLUTION INTRAMUSCULAR; INTRAVENOUS PRN
Status: COMPLETED | OUTPATIENT
Start: 2025-08-08 | End: 2025-08-08

## 2025-08-08 RX ORDER — MIDAZOLAM HYDROCHLORIDE 1 MG/ML
INJECTION, SOLUTION INTRAMUSCULAR; INTRAVENOUS PRN
Status: COMPLETED | OUTPATIENT
Start: 2025-08-08 | End: 2025-08-08

## 2025-08-08 RX ADMIN — MIDAZOLAM 0.5 MG: 1 INJECTION INTRAMUSCULAR; INTRAVENOUS at 13:38

## 2025-08-08 RX ADMIN — FENTANYL CITRATE 25 MCG: 50 INJECTION INTRAMUSCULAR; INTRAVENOUS at 13:41

## 2025-08-08 RX ADMIN — FENTANYL CITRATE 50 MCG: 50 INJECTION INTRAMUSCULAR; INTRAVENOUS at 13:35

## 2025-08-08 RX ADMIN — FENTANYL CITRATE 25 MCG: 50 INJECTION INTRAMUSCULAR; INTRAVENOUS at 13:38

## 2025-08-08 RX ADMIN — MIDAZOLAM 0.5 MG: 1 INJECTION INTRAMUSCULAR; INTRAVENOUS at 13:41

## 2025-08-08 RX ADMIN — MIDAZOLAM 1 MG: 1 INJECTION INTRAMUSCULAR; INTRAVENOUS at 13:35

## 2025-08-11 LAB
FUNGUS SPEC CULT: NORMAL
LOEFFLER MB STN SPEC: NORMAL

## 2025-08-12 ENCOUNTER — HOSPITAL ENCOUNTER (OUTPATIENT)
Dept: MRI IMAGING | Age: 75
Discharge: HOME | End: 2025-08-12
Payer: MEDICARE

## 2025-08-12 DIAGNOSIS — C50.111 MALIGNANT NEOPLASM OF CENTRAL PORTION OF RIGHT FEMALE BREAST, UNSPECIFIED ESTROGEN RECEPTOR STATUS (HCC): ICD-10-CM

## 2025-08-12 LAB
ACID FAST STN SPEC QL: NORMAL
MYCOBACTERIUM SPEC CULT: NORMAL

## 2025-08-12 PROCEDURE — A9579 GAD-BASE MR CONTRAST NOS,1ML: HCPCS | Performed by: INTERNAL MEDICINE

## 2025-08-12 PROCEDURE — 6360000004 HC RX CONTRAST MEDICATION: Performed by: INTERNAL MEDICINE

## 2025-08-12 PROCEDURE — 70553 MRI BRAIN STEM W/O & W/DYE: CPT

## 2025-08-12 RX ORDER — GADOTERIDOL 279.3 MG/ML
9 INJECTION INTRAVENOUS
Status: COMPLETED | OUTPATIENT
Start: 2025-08-12 | End: 2025-08-12

## 2025-08-12 RX ADMIN — GADOTERIDOL 9 ML: 279.3 INJECTION, SOLUTION INTRAVENOUS at 11:30

## 2025-08-19 LAB
ACID FAST STN SPEC QL: NORMAL
MYCOBACTERIUM SPEC CULT: NORMAL

## 2025-08-26 LAB
ACID FAST STN SPEC QL: NORMAL
MYCOBACTERIUM SPEC CULT: NORMAL

## 2025-09-03 ENCOUNTER — TELEPHONE (OUTPATIENT)
Dept: PULMONOLOGY | Age: 75
End: 2025-09-03

## (undated) DEVICE — SUTURE VCRL + SZ 3-0 L18IN ABSRB UD SH 1/2 CIR TAPERCUT NDL VCP864D

## (undated) DEVICE — TOWEL OR BLUEE 16X26IN ST 8 PACK ORB08 16X26ORTWL

## (undated) DEVICE — ELECTRODE,ECG,STRESS,FOAM,3PK: Brand: MEDLINE

## (undated) DEVICE — DRAPE C ARM W46XL120IN XLN

## (undated) DEVICE — UNIVERSAL BLOCK TRAY: Brand: MEDLINE INDUSTRIES, INC.

## (undated) DEVICE — KIT JACK TBL PT CARE

## (undated) DEVICE — SUTURE STRATAFIX SPRL SZ 2-0 L14IN ABSRB VLT MH L36MM 1/2 SXPD2B412

## (undated) DEVICE — SUTURE VICRYL + SZ 3-0 L18IN ABSRB UD SH 1/2 CIR TAPERCUT NDL VCP864D

## (undated) DEVICE — GOWN SIRUS NONREIN XL W/TWL: Brand: MEDLINE INDUSTRIES, INC.

## (undated) DEVICE — CHLORAPREP 26ML ORANGE

## (undated) DEVICE — CRADLE POS PRONE 24 X 5 X 3 IN ARM N COMPR NO CVR FOAM DISP

## (undated) DEVICE — Device

## (undated) DEVICE — 96"PROBE COVER (US)10PK: Brand: SITE-RITE

## (undated) DEVICE — NEEDLE HYPO 25GA L1.5IN BLU POLYPR HUB S STL REG BVL STR

## (undated) DEVICE — DRESSING GRMCDL D0.75N CNTR HOLE D1.5MM BLUE CHLRHXDNE GNT A

## (undated) DEVICE — YANKAUER,BULB TIP,W/O VENT,RIGID,STERILE: Brand: MEDLINE

## (undated) DEVICE — BIOPSY NEEDLE, 21G: Brand: FLEXISION

## (undated) DEVICE — SUTURE MCRYL + SZ 4-0 L18IN ABSRB UD L19MM PS-2 3/8 CIR MCP496G

## (undated) DEVICE — TUBING, SUCTION, 3/16" X 12', STRAIGHT: Brand: MEDLINE

## (undated) DEVICE — GAUZE,SPONGE,4"X4",16PLY,STRL,LF,10/TRAY: Brand: MEDLINE

## (undated) DEVICE — VISION PROBE ADAPTER AND SUCTION ADAPTER

## (undated) DEVICE — DRAPE,T,LAPARO,TRANS,STERILE: Brand: MEDLINE

## (undated) DEVICE — CAMERA COVER: Brand: UNBRANDED

## (undated) DEVICE — TUBING, SUCTION, 3/16" X 6', STRAIGHT: Brand: MEDLINE

## (undated) DEVICE — BOWL,STERILE,MEDIUM,16 OZ: Brand: MEDLINE

## (undated) DEVICE — KIT COLON W/ 1.1OZ LUB AND 2 END

## (undated) DEVICE — STERILE POLYISOPRENE POWDER-FREE SURGICAL GLOVES: Brand: PROTEXIS

## (undated) DEVICE — SUTURE ABSORBABLE MONOFILAMENT 4-0 PS2 27 IN UD MONOCRYL + SXMP1B119

## (undated) DEVICE — DECANTER: Brand: UNBRANDED

## (undated) DEVICE — COVER,TABLE,44X90,STERILE: Brand: MEDLINE

## (undated) DEVICE — GOWN SIRUS NONREIN LG W/TWL: Brand: MEDLINE INDUSTRIES, INC.

## (undated) DEVICE — DRAIN SURG 15FR L3/16IN DIA4.7MM SIL CHN RND HUBLESS FULL

## (undated) DEVICE — SUTURE VCRL + SZ 3-0 L27IN ABSRB UD L26MM SH 1/2 CIR VCP416H

## (undated) DEVICE — KIT PRECLEANING BS ENDOSCP

## (undated) DEVICE — NEEDLE ASPIR WITH SYRINGE 22GA L700MM US GUID TREAT DST END FOR

## (undated) DEVICE — BLADE,STAINLESS-STEEL,11,STRL,DISPOSABLE: Brand: MEDLINE

## (undated) DEVICE — TUBING SUCT 10FR MAL ALUM SHFT FN CAP VENT UNIV CONN W/ OBT

## (undated) DEVICE — COVER,MAYO STAND,XL,STERILE: Brand: MEDLINE

## (undated) DEVICE — BRONCHOSCOPE CYTOLOGY BRUSH: Brand: COOK

## (undated) DEVICE — SINGLE USE SUCTION VALVE MAJ-209: Brand: SINGLE USE SUCTION VALVE (STERILE)

## (undated) DEVICE — BLADE ES ELASTOMERIC COAT INSUL DURABLE BEND UPTO 90DEG

## (undated) DEVICE — LIQUIBAND RAPID ADHESIVE 36/CS 0.8ML: Brand: MEDLINE

## (undated) DEVICE — MINOR SET UP MHAZ: Brand: MEDLINE INDUSTRIES, INC.

## (undated) DEVICE — ALCOHOL RUBBING 16OZ 70% ISO

## (undated) DEVICE — ADAPTER,CATHETER/SYRINGE/LUER,STERILE: Brand: MEDLINE

## (undated) DEVICE — COVER US PRB W12XL244CM SURGICAL INTRAOPERATIVE PLAS TAPR L

## (undated) DEVICE — TRAP,MUCUS SPECIMEN, 80CC: Brand: MEDLINE

## (undated) DEVICE — SWIVEL CONNECTOR

## (undated) DEVICE — NEPTUNE E-SEP SMOKE EVACUATION PENCIL, COATED, 70MM BLADE, PUSH BUTTON SWITCH: Brand: NEPTUNE E-SEP

## (undated) DEVICE — YANKAUER,OPEN TIP,W/O VENT,STERILE: Brand: MEDLINE INDUSTRIES, INC.

## (undated) DEVICE — SKIN AFFIX SURG ADHESIVE 72/CS 0.55ML: Brand: MEDLINE

## (undated) DEVICE — SYRINGE MED 10ML TRNSLUC BRL PLUNG BLK MRK POLYPR CTRL

## (undated) DEVICE — GLOVE,SURG,SENSICARE SLT,LF,PF,6.5: Brand: MEDLINE

## (undated) DEVICE — SINGLE USE BIOPSY VALVE MAJ-210: Brand: SINGLE USE BIOPSY VALVE (STERILE)

## (undated) DEVICE — TOOL 14MH30 LEGEND 14CM 3MM: Brand: MIDAS REX ™

## (undated) DEVICE — 3M™ TEGADERM™ TRANSPARENT FILM DRESSING FRAME STYLE, 1624W, 2-3/8 IN X 2-3/4 IN (6 CM X 7 CM), 100/CT 4CT/CASE: Brand: 3M™ TEGADERM™

## (undated) DEVICE — LINER SUCT CANSTR 3000CC PLAS SFT PRE ASSEMB W/OUT TBNG W/

## (undated) DEVICE — SUTURE VCRL SZ 0 L27IN ABSRB UD L26MM CT-2 1/2 CIR J270H

## (undated) DEVICE — SUTURE PROL 2-0 L48IN NONABSORBABLE BLU SH L26MM 1/2 CIR 8533H

## (undated) DEVICE — GOWN,REINF,POLY,AURORA,XLNG/XXL,STRL: Brand: MEDLINE

## (undated) DEVICE — SUTURE MONOCRYL + SZ 4-0 L18IN ABSRB UD L19MM PS-2 3/8 CIR MCP496G

## (undated) DEVICE — Device: Brand: BALLOON

## (undated) DEVICE — SYRINGE MED 50ML LUERLOCK TIP

## (undated) DEVICE — GLOVE,SURG,SENSICARE SLT,LF,PF,7.5: Brand: MEDLINE

## (undated) DEVICE — GOWN, COVER, POLYCTD,KNITCF,OVRHD,BL,L: Brand: MEDLINE

## (undated) DEVICE — SYRINGE MED 10ML LUERLOCK TIP W/O SFTY DISP

## (undated) DEVICE — SOLUTION IV IRRIG POUR BRL 0.9% SODIUM CHL 2F7124